# Patient Record
Sex: FEMALE | Race: WHITE | Employment: UNEMPLOYED | URBAN - METROPOLITAN AREA
[De-identification: names, ages, dates, MRNs, and addresses within clinical notes are randomized per-mention and may not be internally consistent; named-entity substitution may affect disease eponyms.]

---

## 2017-01-18 NOTE — TELEPHONE ENCOUNTER
Hypertensive Medications  Protocol Criteria:  · Appointment scheduled in the past 6 months or in the next 3 months  · BMP or CMP in the past 12 months  · Creatinine result < 2  Recent Visits       Provider Department Primary Dx    6 months ago [place],

## 2017-01-19 RX ORDER — LOSARTAN POTASSIUM 50 MG/1
TABLET ORAL
Qty: 60 TABLET | Refills: 1 | Status: SHIPPED | OUTPATIENT
Start: 2017-01-19 | End: 2017-05-12

## 2017-01-19 RX ORDER — BUPROPION HYDROCHLORIDE 150 MG/1
TABLET, EXTENDED RELEASE ORAL
Qty: 60 TABLET | Refills: 2 | Status: SHIPPED | OUTPATIENT
Start: 2017-01-19 | End: 2017-05-12

## 2017-02-01 ENCOUNTER — OFFICE VISIT (OUTPATIENT)
Dept: INTERNAL MEDICINE CLINIC | Facility: CLINIC | Age: 62
End: 2017-02-01

## 2017-02-01 VITALS
SYSTOLIC BLOOD PRESSURE: 134 MMHG | DIASTOLIC BLOOD PRESSURE: 86 MMHG | BODY MASS INDEX: 40.93 KG/M2 | HEIGHT: 63 IN | HEART RATE: 83 BPM | WEIGHT: 231 LBS | RESPIRATION RATE: 20 BRPM | TEMPERATURE: 98 F

## 2017-02-01 DIAGNOSIS — I10 ESSENTIAL HYPERTENSION: Primary | ICD-10-CM

## 2017-02-01 DIAGNOSIS — R10.11 RUQ ABDOMINAL PAIN: ICD-10-CM

## 2017-02-01 DIAGNOSIS — R30.0 DYSURIA: ICD-10-CM

## 2017-02-01 PROCEDURE — 99214 OFFICE O/P EST MOD 30 MIN: CPT | Performed by: INTERNAL MEDICINE

## 2017-02-01 PROCEDURE — 99212 OFFICE O/P EST SF 10 MIN: CPT | Performed by: INTERNAL MEDICINE

## 2017-02-01 RX ORDER — LOSARTAN POTASSIUM 50 MG/1
100 TABLET ORAL
Qty: 60 TABLET | Refills: 0 | Status: SHIPPED | OUTPATIENT
Start: 2017-02-01 | End: 2017-08-09

## 2017-02-01 RX ORDER — OMEPRAZOLE 40 MG/1
40 CAPSULE, DELAYED RELEASE ORAL DAILY
Qty: 30 CAPSULE | Refills: 1 | Status: SHIPPED | OUTPATIENT
Start: 2017-02-01 | End: 2017-03-03

## 2017-02-01 NOTE — PROGRESS NOTES
HPI:    Patient ID: Latha Stapleton is a 59year old female. Pain  Chronicity:  r back /flank pain  Episode onset: 3  days  The problem occurs intermittently. The problem has been waxing and waning.  Associated symptoms include abdominal pain and arthr 12 Hr TAKE 1 TABLET (150 MG TOTAL) BY MOUTH 2 (TWO) TIMES DAILY. Disp: 60 tablet Rfl: 2   Ciclopirox 0.77 % External Gel Apply topically 2 (two) times daily. Disp:  Rfl: 11   Fluocinonide 0.05 % External Gel Apply 60 g topically daily.  From podiatrist Sri Tate (36.5 °C), temperature source Oral, resp. rate 20, height 5' 3\" (1.6 m), weight 231 lb (104.781 kg).          ASSESSMENT/PLAN:   Essential hypertension  (primary encounter diagnosis)  Take high blood pressure medication as perscribed   Low- sodium diet (2g

## 2017-02-07 ENCOUNTER — LAB ENCOUNTER (OUTPATIENT)
Dept: LAB | Age: 62
End: 2017-02-07
Attending: INTERNAL MEDICINE
Payer: COMMERCIAL

## 2017-02-07 ENCOUNTER — HOSPITAL ENCOUNTER (OUTPATIENT)
Dept: ULTRASOUND IMAGING | Age: 62
Discharge: HOME | End: 2017-02-07
Attending: INTERNAL MEDICINE
Payer: COMMERCIAL

## 2017-02-07 DIAGNOSIS — I10 ESSENTIAL HYPERTENSION: ICD-10-CM

## 2017-02-07 DIAGNOSIS — R10.11 RUQ ABDOMINAL PAIN: ICD-10-CM

## 2017-02-07 DIAGNOSIS — R30.0 DYSURIA: ICD-10-CM

## 2017-02-07 LAB
ALBUMIN SERPL BCP-MCNC: 3.8 G/DL (ref 3.5–4.8)
ALBUMIN/GLOB SERPL: 1.3 {RATIO} (ref 1–2)
ALP SERPL-CCNC: 50 U/L (ref 32–100)
ALT SERPL-CCNC: 22 U/L (ref 14–54)
ANION GAP SERPL CALC-SCNC: 6 MMOL/L (ref 0–18)
AST SERPL-CCNC: 19 U/L (ref 15–41)
BACTERIA UR QL AUTO: NEGATIVE /HPF
BASOPHILS # BLD: 0.1 K/UL (ref 0–0.2)
BASOPHILS NFR BLD: 1 %
BILIRUB SERPL-MCNC: 0.6 MG/DL (ref 0.3–1.2)
BUN SERPL-MCNC: 14 MG/DL (ref 8–20)
BUN/CREAT SERPL: 17.7 (ref 10–20)
CALCIUM SERPL-MCNC: 9.2 MG/DL (ref 8.5–10.5)
CHLORIDE SERPL-SCNC: 106 MMOL/L (ref 95–110)
CHOLEST SERPL-MCNC: 197 MG/DL (ref 110–200)
CO2 SERPL-SCNC: 27 MMOL/L (ref 22–32)
CREAT SERPL-MCNC: 0.79 MG/DL (ref 0.5–1.5)
EOSINOPHIL # BLD: 0.2 K/UL (ref 0–0.7)
EOSINOPHIL NFR BLD: 3 %
ERYTHROCYTE [DISTWIDTH] IN BLOOD BY AUTOMATED COUNT: 14.7 % (ref 11–15)
GLOBULIN PLAS-MCNC: 3 G/DL (ref 2.5–3.7)
GLUCOSE SERPL-MCNC: 97 MG/DL (ref 70–99)
HCT VFR BLD AUTO: 42.5 % (ref 35–48)
HDLC SERPL-MCNC: 68 MG/DL
HGB BLD-MCNC: 13.9 G/DL (ref 12–16)
LDLC SERPL CALC-MCNC: 111 MG/DL (ref 0–99)
LIPASE SERPL-CCNC: 31 U/L (ref 22–51)
LYMPHOCYTES # BLD: 1.6 K/UL (ref 1–4)
LYMPHOCYTES NFR BLD: 20 %
MCH RBC QN AUTO: 28.7 PG (ref 27–32)
MCHC RBC AUTO-ENTMCNC: 32.7 G/DL (ref 32–37)
MCV RBC AUTO: 87.7 FL (ref 80–100)
MONOCYTES # BLD: 1 K/UL (ref 0–1)
MONOCYTES NFR BLD: 12 %
NEUTROPHILS # BLD AUTO: 5.1 K/UL (ref 1.8–7.7)
NEUTROPHILS NFR BLD: 64 %
NONHDLC SERPL-MCNC: 129 MG/DL
OSMOLALITY UR CALC.SUM OF ELEC: 288 MOSM/KG (ref 275–295)
PLATELET # BLD AUTO: 362 K/UL (ref 140–400)
PMV BLD AUTO: 9.1 FL (ref 7.4–10.3)
POTASSIUM SERPL-SCNC: 4 MMOL/L (ref 3.3–5.1)
PROT SERPL-MCNC: 6.8 G/DL (ref 5.9–8.4)
RBC # BLD AUTO: 4.84 M/UL (ref 3.7–5.4)
RBC #/AREA URNS AUTO: 1 /HPF
SODIUM SERPL-SCNC: 139 MMOL/L (ref 136–144)
TRIGL SERPL-MCNC: 91 MG/DL (ref 1–149)
TSH SERPL-ACNC: 1.91 UIU/ML (ref 0.34–5.6)
WBC # BLD AUTO: 7.9 K/UL (ref 4–11)
WBC #/AREA URNS AUTO: 1 /HPF

## 2017-02-07 PROCEDURE — 83690 ASSAY OF LIPASE: CPT

## 2017-02-07 PROCEDURE — 76700 US EXAM ABDOM COMPLETE: CPT

## 2017-02-07 PROCEDURE — 84443 ASSAY THYROID STIM HORMONE: CPT

## 2017-02-07 PROCEDURE — 80053 COMPREHEN METABOLIC PANEL: CPT

## 2017-02-07 PROCEDURE — 81015 MICROSCOPIC EXAM OF URINE: CPT

## 2017-02-07 PROCEDURE — 87086 URINE CULTURE/COLONY COUNT: CPT

## 2017-02-07 PROCEDURE — 87147 CULTURE TYPE IMMUNOLOGIC: CPT

## 2017-02-07 PROCEDURE — 85025 COMPLETE CBC W/AUTO DIFF WBC: CPT

## 2017-02-07 PROCEDURE — 80061 LIPID PANEL: CPT

## 2017-02-07 PROCEDURE — 36415 COLL VENOUS BLD VENIPUNCTURE: CPT

## 2017-02-23 ENCOUNTER — TELEPHONE (OUTPATIENT)
Dept: INTERNAL MEDICINE CLINIC | Facility: CLINIC | Age: 62
End: 2017-02-23

## 2017-02-24 NOTE — TELEPHONE ENCOUNTER
----- Message from Sherrie Gar MD sent at 2/10/2017  7:48 AM CST -----  Please  Call patient  US    Abdomen   1. No  evidence of cholelithiasis or acute cholecystitis !     2. Negative for hepatic biliary dilatation.     3. Hepatic steatosis. -FATTY

## 2017-02-24 NOTE — TELEPHONE ENCOUNTER
Spoke with patient ( verified name and ), reviewed information, patient verbalized understanding and agrees with plan.

## 2017-02-28 ENCOUNTER — HOSPITAL (OUTPATIENT)
Dept: OTHER | Age: 62
End: 2017-02-28
Attending: FAMILY MEDICINE

## 2017-05-15 RX ORDER — LOSARTAN POTASSIUM 50 MG/1
TABLET ORAL
Qty: 180 TABLET | Refills: 0 | Status: SHIPPED | OUTPATIENT
Start: 2017-05-15 | End: 2017-08-09

## 2017-05-15 RX ORDER — BUPROPION HYDROCHLORIDE 150 MG/1
TABLET, EXTENDED RELEASE ORAL
Qty: 180 TABLET | Refills: 0 | Status: SHIPPED | OUTPATIENT
Start: 2017-05-15 | End: 2018-02-13

## 2017-05-15 NOTE — TELEPHONE ENCOUNTER
Hypertensive Medications  Protocol Criteria:  · Appointment scheduled in the past 6 months or in the next 3 months  · BMP or CMP in the past 12 months  · Creatinine result < 2  Recent Visits       Provider Department Primary Dx    3 months ago [place],

## 2017-08-03 ENCOUNTER — TELEPHONE (OUTPATIENT)
Dept: ORTHOPEDICS CLINIC | Facility: CLINIC | Age: 62
End: 2017-08-03

## 2017-08-03 ENCOUNTER — TELEPHONE (OUTPATIENT)
Dept: INTERNAL MEDICINE CLINIC | Facility: CLINIC | Age: 62
End: 2017-08-03

## 2017-08-09 RX ORDER — LOSARTAN POTASSIUM 50 MG/1
100 TABLET ORAL
Qty: 180 TABLET | Refills: 0 | Status: SHIPPED | OUTPATIENT
Start: 2017-08-09 | End: 2017-12-02

## 2017-08-09 NOTE — TELEPHONE ENCOUNTER
Hypertensive Medications  Protocol Criteria:  · Appointment scheduled in the past 6 months or in the next 3 months  · BMP or CMP in the past 12 months  · Creatinine result < 2  Recent Outpatient Visits            6 months ago Essential hypertension    [place]

## 2017-12-02 NOTE — TELEPHONE ENCOUNTER
Hypertensive Medications  Protocol Criteria:  · Appointment scheduled in the past 6 months or in the next 3 months  · BMP or CMP in the past 12 months  · Creatinine result < 2  Recent Outpatient Visits            10 months ago Essential hypertension    [place]

## 2017-12-05 RX ORDER — LOSARTAN POTASSIUM 50 MG/1
TABLET ORAL
Qty: 180 TABLET | Refills: 0 | Status: SHIPPED | OUTPATIENT
Start: 2017-12-05 | End: 2018-03-15

## 2018-01-29 ENCOUNTER — TELEPHONE (OUTPATIENT)
Dept: OTHER | Age: 63
End: 2018-01-29

## 2018-01-29 NOTE — TELEPHONE ENCOUNTER
Please advise to the communication below. Pt last seen 02/17. Thank you. Nereyda Barrera Please respond to pool: EM IM LMB LPN/CMA

## 2018-01-29 NOTE — TELEPHONE ENCOUNTER
Pt  Need apt   Last  Visit     Almost  1 year  ago - need  Visit .      Please  Schedule apt  Also for pt  to bring  written  Order  From  Doctor  -  For compression  Stockings

## 2018-01-29 NOTE — TELEPHONE ENCOUNTER
Need Rx for compression stockings 30/40 flat knit thigh high and knee high  For lymphedemia  Pt requesting it mailed to   [address], [address]      Please respond to pool: EM IM LMB LPN/CMA

## 2018-01-30 NOTE — TELEPHONE ENCOUNTER
Message was relayed and left on voicemail. RAYMUNDO when pt calls back please assist her with scheduling an appt. Thank you.

## 2018-02-01 ENCOUNTER — TELEPHONE (OUTPATIENT)
Dept: OTHER | Age: 63
End: 2018-02-01

## 2018-02-01 NOTE — TELEPHONE ENCOUNTER
Pt called requesting merino Order for Compression stockings  Flat Knit Thigh High 30-40 compression    DX Lymphedema   Pt requesting an appt on Next Wednesday with You but only SDS and TCM available    She has an Appt Next Wednesday at 5 PM for measurement an

## 2018-02-12 NOTE — TELEPHONE ENCOUNTER
Please advise to the communication below. Should pt keep the appt for tomorrow? Or should order be written? Please advise, thank you. Timbo Solares Please respond to pool: FRANTZ HURLEY LPN/FRANCIE

## 2018-02-13 ENCOUNTER — OFFICE VISIT (OUTPATIENT)
Dept: INTERNAL MEDICINE CLINIC | Facility: CLINIC | Age: 63
End: 2018-02-13

## 2018-02-13 VITALS
HEART RATE: 68 BPM | BODY MASS INDEX: 39.98 KG/M2 | DIASTOLIC BLOOD PRESSURE: 80 MMHG | TEMPERATURE: 97 F | RESPIRATION RATE: 20 BRPM | SYSTOLIC BLOOD PRESSURE: 141 MMHG | HEIGHT: 63 IN | WEIGHT: 225.63 LBS

## 2018-02-13 DIAGNOSIS — Z00.00 PHYSICAL EXAM: ICD-10-CM

## 2018-02-13 DIAGNOSIS — Z23 NEED FOR VACCINATION: ICD-10-CM

## 2018-02-13 DIAGNOSIS — Z12.11 SCREENING FOR MALIGNANT NEOPLASM OF COLON: ICD-10-CM

## 2018-02-13 DIAGNOSIS — I10 ESSENTIAL HYPERTENSION: ICD-10-CM

## 2018-02-13 DIAGNOSIS — Z12.31 SCREENING MAMMOGRAM, ENCOUNTER FOR: ICD-10-CM

## 2018-02-13 DIAGNOSIS — C43.72: ICD-10-CM

## 2018-02-13 DIAGNOSIS — I89.0 LYMPHEDEMA OF LEFT LEG: Primary | ICD-10-CM

## 2018-02-13 PROCEDURE — 99213 OFFICE O/P EST LOW 20 MIN: CPT | Performed by: INTERNAL MEDICINE

## 2018-02-13 PROCEDURE — 90471 IMMUNIZATION ADMIN: CPT | Performed by: INTERNAL MEDICINE

## 2018-02-13 PROCEDURE — 99396 PREV VISIT EST AGE 40-64: CPT | Performed by: INTERNAL MEDICINE

## 2018-02-13 PROCEDURE — 90670 PCV13 VACCINE IM: CPT | Performed by: INTERNAL MEDICINE

## 2018-02-13 NOTE — PROGRESS NOTES
HPI:    Patient ID: Claudette Carballo is a 57year old female.   Patient patient presents today for  htn , physical exam still smoking 1/4  PPD , states doing well otherwise, denies chest pain, shortness of breath, dyspnea on exertion or heart palpitations Current Outpatient Prescriptions:  Elastic Bandages & Supports (SUPPORT COMPRESSION SOCK MENS) Does not apply Misc 20 mm by Does not apply route daily.  20-30  Mm   Hg pressure   Knee  HighLymphedema Disp: 2 each Rfl: 2   LOSARTAN POTASSIUM 50 MG Oral Tab T Psychiatric: She has a normal mood and affect. Her behavior is normal.   Nursing note and vitals reviewed.              ASSESSMENT/PLAN:   Lymphedema of left leg  (primary encounter diagnosis)  Chronic  Mild  Elastic  Sock  Knee   High    20-30  Mm   Stable OP REFERRAL TO [address] [place] [facility]  PNEUMOCOCCAL VACC, 13 MEDINA IM  SEFERINO SCREENING BILAT (CPT=77067)       #[phone]

## 2018-02-21 ENCOUNTER — TELEPHONE (OUTPATIENT)
Dept: HEMATOLOGY/ONCOLOGY | Facility: HOSPITAL | Age: 63
End: 2018-02-21

## 2018-02-21 ENCOUNTER — TELEPHONE (OUTPATIENT)
Dept: GASTROENTEROLOGY | Facility: CLINIC | Age: 63
End: 2018-02-21

## 2018-02-21 DIAGNOSIS — Z86.010 HISTORY OF COLONIC POLYPS: Primary | ICD-10-CM

## 2018-02-21 NOTE — TELEPHONE ENCOUNTER
Pt calling to r/s colon procedure cx in 2016. Last Procedure:   1/7/13 with 6 polyps removed  Last Diagnosis:  Multiple small polyps removed from throughout the colon. 2.  Small internal hemorrhoids, likely source of bleeding.   3.  Early diverticular

## 2018-02-21 NOTE — TELEPHONE ENCOUNTER
The patient's chart has been reviewed. Okay to schedule pt for 3-5 year year colonoscopy recall r/t history of colon polyps with Dr. Sukhwinder Martinez. Advise MAC sedation due to BMI >30 with split dose Suprep (eRx) preparation.    May substitute Colyte/TriLyte as

## 2018-02-21 NOTE — TELEPHONE ENCOUNTER
I attempted to reach Jag Weems to book an Onc consult with Dr Xiomara Cesar for surveillance of hx of Melanoma left 1st toe. I left the office number & asked her to please call the office at her earliest convenience.

## 2018-02-23 NOTE — TELEPHONE ENCOUNTER
Scheduled for:  Colonoscopy 73881  Provider Name: Dr. Jorge Burton  Date:  3/12/18  Location:  [facility]  Sedation:  MAC  Time:  0930 (pt is aware to arrive at 0830)   Prep:  Suprep,  Mailed 2/23/18 with codes  Meds/Allergies Reconciled?:  Norma/VIOLET reviewed   Diagnos

## 2018-02-24 ENCOUNTER — LAB ENCOUNTER (OUTPATIENT)
Dept: LAB | Age: 63
End: 2018-02-24
Attending: INTERNAL MEDICINE
Payer: COMMERCIAL

## 2018-02-24 ENCOUNTER — HOSPITAL ENCOUNTER (OUTPATIENT)
Dept: MAMMOGRAPHY | Age: 63
Discharge: HOME | End: 2018-02-24
Attending: INTERNAL MEDICINE
Payer: COMMERCIAL

## 2018-02-24 DIAGNOSIS — Z12.31 SCREENING MAMMOGRAM, ENCOUNTER FOR: ICD-10-CM

## 2018-02-24 DIAGNOSIS — I10 ESSENTIAL HYPERTENSION: ICD-10-CM

## 2018-02-24 DIAGNOSIS — C43.72: ICD-10-CM

## 2018-02-24 DIAGNOSIS — Z00.00 PHYSICAL EXAM: ICD-10-CM

## 2018-02-24 LAB
ALBUMIN SERPL BCP-MCNC: 4 G/DL (ref 3.5–4.8)
ALBUMIN/GLOB SERPL: 1.4 {RATIO} (ref 1–2)
ALP SERPL-CCNC: 44 U/L (ref 32–100)
ALT SERPL-CCNC: 26 U/L (ref 14–54)
ANION GAP SERPL CALC-SCNC: 7 MMOL/L (ref 0–18)
AST SERPL-CCNC: 21 U/L (ref 15–41)
BACTERIA UR QL AUTO: NEGATIVE /HPF
BASOPHILS # BLD: 0.1 K/UL (ref 0–0.2)
BASOPHILS NFR BLD: 1 %
BILIRUB SERPL-MCNC: 0.7 MG/DL (ref 0.3–1.2)
BUN SERPL-MCNC: 14 MG/DL (ref 8–20)
BUN/CREAT SERPL: 17.3 (ref 10–20)
CALCIUM SERPL-MCNC: 9.8 MG/DL (ref 8.5–10.5)
CHLORIDE SERPL-SCNC: 107 MMOL/L (ref 95–110)
CHOLEST SERPL-MCNC: 197 MG/DL (ref 110–200)
CO2 SERPL-SCNC: 26 MMOL/L (ref 22–32)
CREAT SERPL-MCNC: 0.81 MG/DL (ref 0.5–1.5)
EOSINOPHIL # BLD: 0.2 K/UL (ref 0–0.7)
EOSINOPHIL NFR BLD: 3 %
ERYTHROCYTE [DISTWIDTH] IN BLOOD BY AUTOMATED COUNT: 15 % (ref 11–15)
GLOBULIN PLAS-MCNC: 2.8 G/DL (ref 2.5–3.7)
GLUCOSE SERPL-MCNC: 96 MG/DL (ref 70–99)
HCT VFR BLD AUTO: 42.9 % (ref 35–48)
HDLC SERPL-MCNC: 66 MG/DL
HGB BLD-MCNC: 14.5 G/DL (ref 12–16)
LDLC SERPL CALC-MCNC: 113 MG/DL (ref 0–99)
LYMPHOCYTES # BLD: 1.8 K/UL (ref 1–4)
LYMPHOCYTES NFR BLD: 21 %
MCH RBC QN AUTO: 29.3 PG (ref 27–32)
MCHC RBC AUTO-ENTMCNC: 33.8 G/DL (ref 32–37)
MCV RBC AUTO: 86.7 FL (ref 80–100)
MONOCYTES # BLD: 1.2 K/UL (ref 0–1)
MONOCYTES NFR BLD: 14 %
NEUTROPHILS # BLD AUTO: 5.2 K/UL (ref 1.8–7.7)
NEUTROPHILS NFR BLD: 61 %
NONHDLC SERPL-MCNC: 131 MG/DL
OSMOLALITY UR CALC.SUM OF ELEC: 290 MOSM/KG (ref 275–295)
PATIENT FASTING: YES
PLATELET # BLD AUTO: 323 K/UL (ref 140–400)
PMV BLD AUTO: 9.4 FL (ref 7.4–10.3)
POTASSIUM SERPL-SCNC: 4.5 MMOL/L (ref 3.3–5.1)
PROT SERPL-MCNC: 6.8 G/DL (ref 5.9–8.4)
RBC # BLD AUTO: 4.95 M/UL (ref 3.7–5.4)
RBC #/AREA URNS AUTO: 1 /HPF
SODIUM SERPL-SCNC: 140 MMOL/L (ref 136–144)
TRIGL SERPL-MCNC: 88 MG/DL (ref 1–149)
TSH SERPL-ACNC: 0.84 UIU/ML (ref 0.45–5.33)
WBC # BLD AUTO: 8.5 K/UL (ref 4–11)
WBC #/AREA URNS AUTO: 4 /HPF

## 2018-02-24 PROCEDURE — 36415 COLL VENOUS BLD VENIPUNCTURE: CPT

## 2018-02-24 PROCEDURE — 84443 ASSAY THYROID STIM HORMONE: CPT

## 2018-02-24 PROCEDURE — 80061 LIPID PANEL: CPT

## 2018-02-24 PROCEDURE — 77067 SCR MAMMO BI INCL CAD: CPT | Performed by: INTERNAL MEDICINE

## 2018-02-24 PROCEDURE — 85025 COMPLETE CBC W/AUTO DIFF WBC: CPT

## 2018-02-24 PROCEDURE — 80053 COMPREHEN METABOLIC PANEL: CPT

## 2018-02-24 PROCEDURE — 81015 MICROSCOPIC EXAM OF URINE: CPT

## 2018-02-26 ENCOUNTER — TELEPHONE (OUTPATIENT)
Dept: GASTROENTEROLOGY | Facility: CLINIC | Age: 63
End: 2018-02-26

## 2018-02-26 NOTE — TELEPHONE ENCOUNTER
Pt states that RX for prep is too expensive. Please call.         Current Outpatient Prescriptions:   •  Na Sulfate-K Sulfate-Mg Sulf (SUPREP BOWEL PREP KIT) 17.5-3.13-1.6 GM/180ML Oral Solution, Take as directed per GI consult notes, Disp: 1 Bottle, Rfl:

## 2018-02-27 ENCOUNTER — OFFICE VISIT (OUTPATIENT)
Dept: HEMATOLOGY/ONCOLOGY | Facility: HOSPITAL | Age: 63
End: 2018-02-27
Attending: INTERNAL MEDICINE
Payer: COMMERCIAL

## 2018-02-27 VITALS
HEIGHT: 63 IN | WEIGHT: 223 LBS | TEMPERATURE: 99 F | SYSTOLIC BLOOD PRESSURE: 125 MMHG | RESPIRATION RATE: 16 BRPM | HEART RATE: 70 BPM | BODY MASS INDEX: 39.51 KG/M2 | DIASTOLIC BLOOD PRESSURE: 53 MMHG

## 2018-02-27 DIAGNOSIS — Z71.6 ENCOUNTER FOR SMOKING CESSATION COUNSELING: ICD-10-CM

## 2018-02-27 DIAGNOSIS — C43.9 MALIGNANT MELANOMA, UNSPECIFIED SITE (HCC): Primary | ICD-10-CM

## 2018-02-27 PROCEDURE — 99245 OFF/OP CONSLTJ NEW/EST HI 55: CPT | Performed by: INTERNAL MEDICINE

## 2018-02-27 NOTE — CONSULTS
[facility]    PATIENT'S NAME: SUSHIL GONZALES   CONSULTING PHYSICIAN: Leanne Oreilly MD   PATIENT ACCOUNT #: [de-identified] LOCATION: [address] [place] [address] RECORD #: [id number] YOB: 1955   CONSULTATION DATE: 02/27/2018       CANCER C mucous membranes. Oropharynx clear. NECK:  Supple. LUNGS:  Symmetric expansion, nonlabored breathing. HEART:  Good distal pulses. ABDOMEN:  Soft, nontender, nondistended. No masses. No organomegaly. EXTREMITIES:  No edema.   SKIN:  No visible or pa

## 2018-02-28 ENCOUNTER — TELEPHONE (OUTPATIENT)
Dept: HEMATOLOGY/ONCOLOGY | Facility: HOSPITAL | Age: 63
End: 2018-02-28

## 2018-02-28 ENCOUNTER — TELEPHONE (OUTPATIENT)
Dept: INTERNAL MEDICINE CLINIC | Facility: CLINIC | Age: 63
End: 2018-02-28

## 2018-02-28 DIAGNOSIS — C43.9 MALIGNANT MELANOMA, UNSPECIFIED SITE (HCC): Primary | ICD-10-CM

## 2018-02-28 NOTE — TELEPHONE ENCOUNTER
Per Dr Von Resendez and PCP left message for Alisha that she will be referred to Dr Enedina Newell in dermatology for surveillance and to call with any questions.

## 2018-02-28 NOTE — TELEPHONE ENCOUNTER
Per Cesario Steel at Dr. Tavia Palmer office it was stated in pt' notes from last OV that referral to derm will be needed. Referral generated and signed per Dr. Tavia Palmer order.

## 2018-03-04 NOTE — PROGRESS NOTES
Please call patient with blood test results. Kidney and liver function are normal, no anemia. Cholesterol is mildly elevated - keep low fat diet  Weight reduction and  Exercise . sugar is normal,  Thyroid hormone is normal as well. Urine is clear.

## 2018-03-08 ENCOUNTER — TELEPHONE (OUTPATIENT)
Dept: GASTROENTEROLOGY | Facility: CLINIC | Age: 63
End: 2018-03-08

## 2018-03-08 NOTE — TELEPHONE ENCOUNTER
Instructions sent Trang, pt contacted. States she doesn't use [employer].  Faxed new instructions to her at [phone] per her request.

## 2018-03-08 NOTE — TELEPHONE ENCOUNTER
Pt has has cln/procedure irma on 3/12 Monday and has questions regarding the prep instructions, pls call at:[phone],thanks. *pt has the generic and now indicates no instruction were received/ had instructions for the suprep only.

## 2018-03-12 ENCOUNTER — ANESTHESIA (OUTPATIENT)
Dept: ENDOSCOPY | Facility: HOSPITAL | Age: 63
End: 2018-03-12
Payer: COMMERCIAL

## 2018-03-12 ENCOUNTER — HOSPITAL ENCOUNTER (OUTPATIENT)
Facility: HOSPITAL | Age: 63
Setting detail: HOSPITAL OUTPATIENT SURGERY
Discharge: HOME | End: 2018-03-12
Attending: INTERNAL MEDICINE | Admitting: INTERNAL MEDICINE
Payer: COMMERCIAL

## 2018-03-12 ENCOUNTER — ANESTHESIA EVENT (OUTPATIENT)
Dept: ENDOSCOPY | Facility: HOSPITAL | Age: 63
End: 2018-03-12
Payer: COMMERCIAL

## 2018-03-12 ENCOUNTER — SURGERY (OUTPATIENT)
Age: 63
End: 2018-03-12

## 2018-03-12 DIAGNOSIS — Z86.010 HISTORY OF COLONIC POLYPS: ICD-10-CM

## 2018-03-12 PROCEDURE — 45380 COLONOSCOPY AND BIOPSY: CPT | Performed by: INTERNAL MEDICINE

## 2018-03-12 PROCEDURE — 0DBL8ZX EXCISION OF TRANSVERSE COLON, VIA NATURAL OR ARTIFICIAL OPENING ENDOSCOPIC, DIAGNOSTIC: ICD-10-PCS | Performed by: INTERNAL MEDICINE

## 2018-03-12 PROCEDURE — 0DBK8ZX EXCISION OF ASCENDING COLON, VIA NATURAL OR ARTIFICIAL OPENING ENDOSCOPIC, DIAGNOSTIC: ICD-10-PCS | Performed by: INTERNAL MEDICINE

## 2018-03-12 PROCEDURE — 45385 COLONOSCOPY W/LESION REMOVAL: CPT | Performed by: INTERNAL MEDICINE

## 2018-03-12 RX ORDER — NALOXONE HYDROCHLORIDE 0.4 MG/ML
80 INJECTION, SOLUTION INTRAMUSCULAR; INTRAVENOUS; SUBCUTANEOUS AS NEEDED
Status: DISCONTINUED | OUTPATIENT
Start: 2018-03-12 | End: 2018-03-12

## 2018-03-12 RX ORDER — MORPHINE SULFATE 10 MG/ML
6 INJECTION, SOLUTION INTRAMUSCULAR; INTRAVENOUS EVERY 10 MIN PRN
Status: DISCONTINUED | OUTPATIENT
Start: 2018-03-12 | End: 2018-03-12

## 2018-03-12 RX ORDER — MORPHINE SULFATE 4 MG/ML
2 INJECTION, SOLUTION INTRAMUSCULAR; INTRAVENOUS EVERY 10 MIN PRN
Status: DISCONTINUED | OUTPATIENT
Start: 2018-03-12 | End: 2018-03-12

## 2018-03-12 RX ORDER — HALOPERIDOL 5 MG/ML
0.25 INJECTION INTRAMUSCULAR ONCE AS NEEDED
Status: DISCONTINUED | OUTPATIENT
Start: 2018-03-12 | End: 2018-03-12

## 2018-03-12 RX ORDER — MIDAZOLAM HYDROCHLORIDE 1 MG/ML
INJECTION INTRAMUSCULAR; INTRAVENOUS AS NEEDED
Status: DISCONTINUED | OUTPATIENT
Start: 2018-03-12 | End: 2018-03-12 | Stop reason: SURG

## 2018-03-12 RX ORDER — ONDANSETRON 2 MG/ML
4 INJECTION INTRAMUSCULAR; INTRAVENOUS ONCE AS NEEDED
Status: DISCONTINUED | OUTPATIENT
Start: 2018-03-12 | End: 2018-03-12

## 2018-03-12 RX ORDER — SODIUM CHLORIDE, SODIUM LACTATE, POTASSIUM CHLORIDE, CALCIUM CHLORIDE 600; 310; 30; 20 MG/100ML; MG/100ML; MG/100ML; MG/100ML
INJECTION, SOLUTION INTRAVENOUS CONTINUOUS
Status: DISCONTINUED | OUTPATIENT
Start: 2018-03-12 | End: 2018-03-12

## 2018-03-12 RX ORDER — LIDOCAINE HYDROCHLORIDE 10 MG/ML
INJECTION, SOLUTION EPIDURAL; INFILTRATION; INTRACAUDAL; PERINEURAL AS NEEDED
Status: DISCONTINUED | OUTPATIENT
Start: 2018-03-12 | End: 2018-03-12 | Stop reason: SURG

## 2018-03-12 RX ORDER — MORPHINE SULFATE 4 MG/ML
4 INJECTION, SOLUTION INTRAMUSCULAR; INTRAVENOUS EVERY 10 MIN PRN
Status: DISCONTINUED | OUTPATIENT
Start: 2018-03-12 | End: 2018-03-12

## 2018-03-12 RX ORDER — HYDROCODONE BITARTRATE AND ACETAMINOPHEN 5; 325 MG/1; MG/1
2 TABLET ORAL AS NEEDED
Status: DISCONTINUED | OUTPATIENT
Start: 2018-03-12 | End: 2018-03-12

## 2018-03-12 RX ORDER — HYDROCODONE BITARTRATE AND ACETAMINOPHEN 5; 325 MG/1; MG/1
1 TABLET ORAL AS NEEDED
Status: DISCONTINUED | OUTPATIENT
Start: 2018-03-12 | End: 2018-03-12

## 2018-03-12 RX ADMIN — MIDAZOLAM HYDROCHLORIDE 2 MG: 1 INJECTION INTRAMUSCULAR; INTRAVENOUS at 09:41:00

## 2018-03-12 RX ADMIN — SODIUM CHLORIDE, SODIUM LACTATE, POTASSIUM CHLORIDE, CALCIUM CHLORIDE: 600; 310; 30; 20 INJECTION, SOLUTION INTRAVENOUS at 09:55:00

## 2018-03-12 RX ADMIN — LIDOCAINE HYDROCHLORIDE 25 MG: 10 INJECTION, SOLUTION EPIDURAL; INFILTRATION; INTRACAUDAL; PERINEURAL at 09:41:00

## 2018-03-12 RX ADMIN — SODIUM CHLORIDE, SODIUM LACTATE, POTASSIUM CHLORIDE, CALCIUM CHLORIDE: 600; 310; 30; 20 INJECTION, SOLUTION INTRAVENOUS at 09:41:00

## 2018-03-12 NOTE — ANESTHESIA POSTPROCEDURE EVALUATION
Patient: Amber Lin    Procedure Summary     Date:  03/12/18 Room / Location:  [address] [place] [address] ENDOSCOPY 05 / [address] [place] [address] ENDOSCOPY    Anesthesia Start:  9562 Anesthesia Stop:  2754    Procedure:  COLONOSCOPY (N/A ) Diagnosis:       History of colonic polyps      (poly

## 2018-03-12 NOTE — H&P
History & Physical Examination    Patient Name: Vince Sarkar  MRN: [id number]  [facility]: [id number]  YOB: 1955    Diagnosis: history of colon polyps        Prescriptions Prior to Admission:  Elastic Bandages & Supports (SUPPORT COMPRESSION SO (VERSED) 2 MG/2ML injection  Intravenous PRN   Lidocaine HCl (PF) (XYLOCAINE) 1 % injection SOLN  Intravenous PRN       Allergies: No Known Allergies    Past Medical History:   Diagnosis Date   • [facility]     melanoma   • Colon polyps    • High blood p

## 2018-03-12 NOTE — ANESTHESIA PREPROCEDURE EVALUATION
Anesthesia PreOp Note    HPI:     Rasisa Heading is a 57year old female who presents for preoperative consultation requested by: Suzan Luong MD    Date of Surgery: 3/12/2018    Procedure(s):  COLONOSCOPY  Indication: History of colonic polyps PACKS) 227.1 g Oral Recon Soln Use as directed prior to colonoscopy Disp: 1 Bottle Rfl: 0 Taking       Current Facility-Administered Medications Ordered in Epic:  lactated ringers infusion  Intravenous Continuous Bradley Gan MD     No current Epic-or Pulse:  79   Resp:  14   SpO2:  98%   Weight: 105.7 kg (233 lb)    Height: 1.6 m (5' 3\")         Anesthesia ROS/Med Hx and Physical Exam     Patient summary reviewed and Nursing notes reviewed    Airway   Mallampati: II  TM distance: >3 FB  Neck ROM: fu

## 2018-03-12 NOTE — OPERATIVE REPORT
[facility] HOSP - [facility] Endoscopy Report      Preoperative Diagnosis:  - history of colon polyps       Postoperative Diagnosis:  - colon polyps x 2  - diverticulosis  - internal hemorrhoids      Procedure:    Colonoscopy       Surgeon:  Yodit Stiles

## 2018-03-13 VITALS
HEIGHT: 63 IN | RESPIRATION RATE: 14 BRPM | SYSTOLIC BLOOD PRESSURE: 126 MMHG | BODY MASS INDEX: 41.29 KG/M2 | HEART RATE: 75 BPM | OXYGEN SATURATION: 94 % | DIASTOLIC BLOOD PRESSURE: 70 MMHG | WEIGHT: 233 LBS

## 2018-03-14 ENCOUNTER — TELEPHONE (OUTPATIENT)
Dept: GASTROENTEROLOGY | Facility: CLINIC | Age: 63
End: 2018-03-14

## 2018-03-14 NOTE — TELEPHONE ENCOUNTER
----- Message from Barbara Herman MD sent at 3/12/2018  3:56 PM CDT -----  I wanted to get back to you with your colonoscopy results. You had 2 colon polyps removed which were benign.   I would advise a repeat colonoscopy in 5 years to make sure no new p

## 2018-03-15 RX ORDER — LOSARTAN POTASSIUM 50 MG/1
TABLET ORAL
Qty: 180 TABLET | Refills: 0 | Status: SHIPPED | OUTPATIENT
Start: 2018-03-15 | End: 2018-06-23

## 2018-03-15 NOTE — TELEPHONE ENCOUNTER
Hypertensive Medications  Protocol Criteria:  · Appointment scheduled in the past 6 months or in the next 3 months  · BMP or CMP in the past 12 months  · Creatinine result < 2  Recent Outpatient Visits            2 weeks ago Malignant melanoma, unspecified

## 2018-06-23 RX ORDER — LOSARTAN POTASSIUM 50 MG/1
TABLET ORAL
Qty: 180 TABLET | Refills: 0 | Status: SHIPPED | OUTPATIENT
Start: 2018-06-23 | End: 2019-01-14

## 2018-06-27 RX ORDER — LOSARTAN POTASSIUM 50 MG/1
TABLET ORAL
Qty: 180 TABLET | Refills: 0 | OUTPATIENT
Start: 2018-06-27

## 2018-09-22 RX ORDER — LOSARTAN POTASSIUM 50 MG/1
TABLET ORAL
Qty: 180 TABLET | Refills: 0 | OUTPATIENT
Start: 2018-09-22

## 2018-10-05 NOTE — TELEPHONE ENCOUNTER
please advise as does not meet RN protocol for refill criteria        Hypertensive Medications  Protocol Criteria:  · Appointment scheduled in the past 6 months or in the next 3 months  · BMP or CMP in the past 12 months  · Creatinine result < 2  Recent O

## 2018-10-06 RX ORDER — LOSARTAN POTASSIUM 50 MG/1
TABLET ORAL
Qty: 180 TABLET | Refills: 0 | Status: SHIPPED | OUTPATIENT
Start: 2018-10-06 | End: 2019-01-10

## 2019-01-14 ENCOUNTER — TELEPHONE (OUTPATIENT)
Dept: INTERNAL MEDICINE CLINIC | Facility: CLINIC | Age: 64
End: 2019-01-14

## 2019-01-14 DIAGNOSIS — Z00.00 ANNUAL PHYSICAL EXAM: Primary | ICD-10-CM

## 2019-01-14 RX ORDER — LOSARTAN POTASSIUM 50 MG/1
TABLET ORAL
Qty: 69 TABLET | Refills: 0 | Status: SHIPPED | OUTPATIENT
Start: 2019-01-14 | End: 2019-02-22

## 2019-01-14 NOTE — TELEPHONE ENCOUNTER
Pt is requesting an order for labs. Order pend for approval. Please advise, thank you. Florencia Cortez .Please respond to pool: EM IM LMB LPN/CMA

## 2019-01-14 NOTE — TELEPHONE ENCOUNTER
Pt called in requesting to have px lab orders placed on to her chart prior to her appt on 2/5.   Please advise

## 2019-01-15 NOTE — TELEPHONE ENCOUNTER
Left detailed message on voice identified mailbox informing patient that lab orders were approved by Dr. Jade Briggs. Encouraged patient to call office back if any questions.

## 2019-02-12 ENCOUNTER — LAB ENCOUNTER (OUTPATIENT)
Dept: LAB | Age: 64
End: 2019-02-12
Attending: INTERNAL MEDICINE
Payer: COMMERCIAL

## 2019-02-12 ENCOUNTER — OFFICE VISIT (OUTPATIENT)
Dept: INTERNAL MEDICINE CLINIC | Facility: CLINIC | Age: 64
End: 2019-02-12
Payer: COMMERCIAL

## 2019-02-12 VITALS
WEIGHT: 215.13 LBS | SYSTOLIC BLOOD PRESSURE: 133 MMHG | DIASTOLIC BLOOD PRESSURE: 78 MMHG | TEMPERATURE: 98 F | HEART RATE: 78 BPM | BODY MASS INDEX: 38.12 KG/M2 | RESPIRATION RATE: 20 BRPM | HEIGHT: 63 IN

## 2019-02-12 DIAGNOSIS — Z12.31 SCREENING MAMMOGRAM, ENCOUNTER FOR: ICD-10-CM

## 2019-02-12 DIAGNOSIS — F17.200 TOBACCO DEPENDENCE: ICD-10-CM

## 2019-02-12 DIAGNOSIS — Z00.00 PE (PHYSICAL EXAM), ROUTINE: ICD-10-CM

## 2019-02-12 DIAGNOSIS — Z00.00 ANNUAL PHYSICAL EXAM: ICD-10-CM

## 2019-02-12 DIAGNOSIS — I10 ESSENTIAL HYPERTENSION: Primary | ICD-10-CM

## 2019-02-12 DIAGNOSIS — F32.A DEPRESSION, UNSPECIFIED DEPRESSION TYPE: ICD-10-CM

## 2019-02-12 LAB
ALBUMIN SERPL-MCNC: 3.6 G/DL (ref 3.4–5)
ALBUMIN/GLOB SERPL: 1 {RATIO} (ref 1–2)
ALP LIVER SERPL-CCNC: 58 U/L (ref 50–130)
ALT SERPL-CCNC: 25 U/L (ref 13–56)
ANION GAP SERPL CALC-SCNC: 7 MMOL/L (ref 0–18)
AST SERPL-CCNC: 13 U/L (ref 15–37)
BASOPHILS # BLD AUTO: 0.1 X10(3) UL (ref 0–0.2)
BASOPHILS NFR BLD AUTO: 1.2 %
BILIRUB SERPL-MCNC: 0.5 MG/DL (ref 0.1–2)
BILIRUB UR QL: NEGATIVE
BUN BLD-MCNC: 20 MG/DL (ref 7–18)
BUN/CREAT SERPL: 22.7 (ref 10–20)
CALCIUM BLD-MCNC: 9.7 MG/DL (ref 8.5–10.1)
CHLORIDE SERPL-SCNC: 107 MMOL/L (ref 98–107)
CHOLEST SMN-MCNC: 200 MG/DL (ref ?–200)
CO2 SERPL-SCNC: 27 MMOL/L (ref 21–32)
COLOR UR: YELLOW
CREAT BLD-MCNC: 0.88 MG/DL (ref 0.55–1.02)
DEPRECATED RDW RBC AUTO: 47.1 FL (ref 35.1–46.3)
EOSINOPHIL # BLD AUTO: 0.17 X10(3) UL (ref 0–0.7)
EOSINOPHIL NFR BLD AUTO: 2 %
ERYTHROCYTE [DISTWIDTH] IN BLOOD BY AUTOMATED COUNT: 14.7 % (ref 11–15)
GLOBULIN PLAS-MCNC: 3.7 G/DL (ref 2.8–4.4)
GLUCOSE BLD-MCNC: 93 MG/DL (ref 70–99)
GLUCOSE UR-MCNC: NEGATIVE MG/DL
HCT VFR BLD AUTO: 43.3 % (ref 35–48)
HDLC SERPL-MCNC: 77 MG/DL (ref 40–59)
HGB BLD-MCNC: 14.5 G/DL (ref 12–16)
IMM GRANULOCYTES # BLD AUTO: 0.02 X10(3) UL (ref 0–1)
IMM GRANULOCYTES NFR BLD: 0.2 %
KETONES UR-MCNC: NEGATIVE MG/DL
LDLC SERPL CALC-MCNC: 108 MG/DL (ref ?–100)
LYMPHOCYTES # BLD AUTO: 1.56 X10(3) UL (ref 1–4)
LYMPHOCYTES NFR BLD AUTO: 18.8 %
M PROTEIN MFR SERPL ELPH: 7.3 G/DL (ref 6.4–8.2)
MCH RBC QN AUTO: 29.2 PG (ref 26–34)
MCHC RBC AUTO-ENTMCNC: 33.5 G/DL (ref 31–37)
MCV RBC AUTO: 87.1 FL (ref 80–100)
MONOCYTES # BLD AUTO: 1.24 X10(3) UL (ref 0.1–1)
MONOCYTES NFR BLD AUTO: 14.9 %
NEUTROPHILS # BLD AUTO: 5.21 X10 (3) UL (ref 1.5–7.7)
NEUTROPHILS # BLD AUTO: 5.21 X10(3) UL (ref 1.5–7.7)
NEUTROPHILS NFR BLD AUTO: 62.9 %
NITRITE UR QL STRIP.AUTO: NEGATIVE
NONHDLC SERPL-MCNC: 123 MG/DL (ref ?–130)
OSMOLALITY SERPL CALC.SUM OF ELEC: 294 MOSM/KG (ref 275–295)
PH UR: 5 [PH] (ref 5–8)
PLATELET # BLD AUTO: 376 10(3)UL (ref 150–450)
POTASSIUM SERPL-SCNC: 4.1 MMOL/L (ref 3.5–5.1)
PROT UR-MCNC: NEGATIVE MG/DL
RBC # BLD AUTO: 4.97 X10(6)UL (ref 3.8–5.3)
RBC #/AREA URNS AUTO: 3 /HPF
SODIUM SERPL-SCNC: 141 MMOL/L (ref 136–145)
SP GR UR STRIP: 1.02 (ref 1–1.03)
TRIGL SERPL-MCNC: 74 MG/DL (ref 30–149)
TSI SER-ACNC: 1.46 MIU/ML (ref 0.36–3.74)
UROBILINOGEN UR STRIP-ACNC: <2
VIT C UR-MCNC: NEGATIVE MG/DL
WBC # BLD AUTO: 8.3 X10(3) UL (ref 4–11)
WBC #/AREA URNS AUTO: 4 /HPF

## 2019-02-12 PROCEDURE — 80061 LIPID PANEL: CPT

## 2019-02-12 PROCEDURE — 85025 COMPLETE CBC W/AUTO DIFF WBC: CPT

## 2019-02-12 PROCEDURE — 80053 COMPREHEN METABOLIC PANEL: CPT

## 2019-02-12 PROCEDURE — 99213 OFFICE O/P EST LOW 20 MIN: CPT | Performed by: INTERNAL MEDICINE

## 2019-02-12 PROCEDURE — 99212 OFFICE O/P EST SF 10 MIN: CPT | Performed by: INTERNAL MEDICINE

## 2019-02-12 PROCEDURE — 99397 PER PM REEVAL EST PAT 65+ YR: CPT | Performed by: INTERNAL MEDICINE

## 2019-02-12 PROCEDURE — 81001 URINALYSIS AUTO W/SCOPE: CPT

## 2019-02-12 PROCEDURE — 84443 ASSAY THYROID STIM HORMONE: CPT

## 2019-02-12 PROCEDURE — 36415 COLL VENOUS BLD VENIPUNCTURE: CPT

## 2019-02-12 RX ORDER — BUPROPION HYDROCHLORIDE 150 MG/1
150 TABLET, EXTENDED RELEASE ORAL 2 TIMES DAILY
Qty: 60 TABLET | Refills: 0 | Status: SHIPPED | OUTPATIENT
Start: 2019-02-12 | End: 2019-03-12

## 2019-02-12 RX ORDER — ALPRAZOLAM 0.25 MG/1
TABLET ORAL
Qty: 4 TABLET | Refills: 0 | OUTPATIENT
Start: 2019-02-12 | End: 2019-09-17 | Stop reason: ALTCHOICE

## 2019-02-12 NOTE — PROGRESS NOTES
HPI:    Patient ID: Marry Knight is a 70year old female.   Patient presents today for physical exam, states doing well otherwise, denies chest pain, shortness of breath, dyspnea on exertion or heart palpitations also denies nausea, vomiting, diarrhea, pain, palpitations, orthopnea, leg swelling and PND. Gastrointestinal: Negative for abdominal pain, constipation, diarrhea, nausea and vomiting. Genitourinary: Negative for dysuria and frequency. Musculoskeletal: Negative for neck pain.    Skin: Negat distress. She has no wheezes. She has no rales. Abdominal: Soft. She exhibits no mass. There is no hepatosplenomegaly. There is no tenderness. There is no CVA tenderness. Musculoskeletal: She exhibits no edema.    Lymphadenopathy:     She has no cervica mammogram, encounter for    Depression, unspecified depression type  Start  bupropion  150 mg twice daily patient was on the medicine previously and did not work for her    Counseling for the smoking sensation as well provided to patient  She refused at th

## 2019-02-22 RX ORDER — LOSARTAN POTASSIUM 50 MG/1
TABLET ORAL
Qty: 60 TABLET | Refills: 0 | Status: SHIPPED | OUTPATIENT
Start: 2019-02-22 | End: 2019-03-21

## 2019-03-02 ENCOUNTER — HOSPITAL ENCOUNTER (OUTPATIENT)
Dept: MAMMOGRAPHY | Age: 64
Discharge: HOME | End: 2019-03-02
Attending: INTERNAL MEDICINE
Payer: COMMERCIAL

## 2019-03-02 DIAGNOSIS — Z12.31 SCREENING MAMMOGRAM, ENCOUNTER FOR: ICD-10-CM

## 2019-03-02 PROCEDURE — 77067 SCR MAMMO BI INCL CAD: CPT | Performed by: INTERNAL MEDICINE

## 2019-03-02 PROCEDURE — 77063 BREAST TOMOSYNTHESIS BI: CPT | Performed by: INTERNAL MEDICINE

## 2019-03-08 ENCOUNTER — OFFICE VISIT (OUTPATIENT)
Dept: INTERNAL MEDICINE CLINIC | Facility: CLINIC | Age: 64
End: 2019-03-08
Payer: COMMERCIAL

## 2019-03-08 VITALS
HEART RATE: 90 BPM | HEIGHT: 63 IN | TEMPERATURE: 98 F | RESPIRATION RATE: 18 BRPM | BODY MASS INDEX: 37.56 KG/M2 | DIASTOLIC BLOOD PRESSURE: 69 MMHG | SYSTOLIC BLOOD PRESSURE: 107 MMHG | WEIGHT: 212 LBS

## 2019-03-08 DIAGNOSIS — I89.0 LYMPHEDEMA OF LEFT LEG: ICD-10-CM

## 2019-03-08 DIAGNOSIS — I10 ESSENTIAL HYPERTENSION: Primary | ICD-10-CM

## 2019-03-08 DIAGNOSIS — M79.89 LEG SWELLING: ICD-10-CM

## 2019-03-08 DIAGNOSIS — C43.9 MALIGNANT MELANOMA, UNSPECIFIED SITE (HCC): ICD-10-CM

## 2019-03-08 PROCEDURE — 99212 OFFICE O/P EST SF 10 MIN: CPT | Performed by: INTERNAL MEDICINE

## 2019-03-08 PROCEDURE — 99214 OFFICE O/P EST MOD 30 MIN: CPT | Performed by: INTERNAL MEDICINE

## 2019-03-08 NOTE — PROGRESS NOTES
HPI:    Patient ID: Gertrudis Owens is a 70year old female. Patient presents with:  Hypertension: Patient present for follow up for HTN      Patient in office today for bp follow up visit. States feeling well otherwise.   Lost some  Weight   With better TAKE 2 TABLETS BY MOUTH EVERY DAY Disp: 60 tablet Rfl: 0   ALPRAZolam 0.25 MG Oral Tab Take 1-2  Tab 60 min  And  30  Min  Before   MRI Disp: 4 tablet Rfl: 0   BuPROPion HCl ER, SR, 150 MG Oral Tablet 12 Hr Take 1 tablet (150 mg total) by mouth 2 (two) elvia normal.   Nursing note and vitals reviewed. Blood pressure 107/69, pulse 90, temperature 97.8 °F (36.6 °C), temperature source Oral, resp. rate 18, height 5' 3\" (1.6 m), weight 212 lb (96.2 kg).              ASSESSMENT/PLAN:   Essential hypertension  (p

## 2019-03-09 PROBLEM — I89.0 LYMPHEDEMA OF LEFT LEG: Status: ACTIVE | Noted: 2019-03-09

## 2019-03-12 RX ORDER — BUPROPION HYDROCHLORIDE 150 MG/1
TABLET, EXTENDED RELEASE ORAL
Qty: 60 TABLET | Refills: 0 | Status: SHIPPED | OUTPATIENT
Start: 2019-03-12 | End: 2019-04-11

## 2019-03-22 RX ORDER — LOSARTAN POTASSIUM 50 MG/1
TABLET ORAL
Qty: 60 TABLET | Refills: 2 | Status: SHIPPED | OUTPATIENT
Start: 2019-03-22 | End: 2019-06-21

## 2019-04-11 RX ORDER — BUPROPION HYDROCHLORIDE 150 MG/1
TABLET, EXTENDED RELEASE ORAL
Qty: 180 TABLET | Refills: 1 | Status: SHIPPED | OUTPATIENT
Start: 2019-04-11 | End: 2019-10-15

## 2019-06-21 RX ORDER — LOSARTAN POTASSIUM 50 MG/1
TABLET ORAL
Qty: 180 TABLET | Refills: 1 | Status: SHIPPED | OUTPATIENT
Start: 2019-06-21 | End: 2019-12-12

## 2019-06-21 NOTE — TELEPHONE ENCOUNTER
Refill passed per [facility] protocol.   Hypertensive Medications  Protocol Criteria:  · Appointment scheduled in the past 6 months or in the next 3 months  · BMP or CMP in the past 12 months  · Creatinine result < 2  Recent Outpatient Visits

## 2019-09-17 ENCOUNTER — OFFICE VISIT (OUTPATIENT)
Dept: INTERNAL MEDICINE CLINIC | Facility: CLINIC | Age: 64
End: 2019-09-17
Payer: COMMERCIAL

## 2019-09-17 VITALS
HEART RATE: 69 BPM | BODY MASS INDEX: 37.05 KG/M2 | HEIGHT: 63 IN | OXYGEN SATURATION: 96 % | WEIGHT: 209.13 LBS | SYSTOLIC BLOOD PRESSURE: 132 MMHG | DIASTOLIC BLOOD PRESSURE: 86 MMHG | TEMPERATURE: 98 F | RESPIRATION RATE: 20 BRPM

## 2019-09-17 DIAGNOSIS — Z23 ENCOUNTER FOR VACCINATION: ICD-10-CM

## 2019-09-17 DIAGNOSIS — F17.200 SMOKING: ICD-10-CM

## 2019-09-17 DIAGNOSIS — C43.9 MALIGNANT MELANOMA, UNSPECIFIED SITE (HCC): ICD-10-CM

## 2019-09-17 DIAGNOSIS — I10 ESSENTIAL HYPERTENSION: Primary | ICD-10-CM

## 2019-09-17 DIAGNOSIS — L98.9 SKIN LESION: ICD-10-CM

## 2019-09-17 PROCEDURE — 90471 IMMUNIZATION ADMIN: CPT | Performed by: INTERNAL MEDICINE

## 2019-09-17 PROCEDURE — 90732 PPSV23 VACC 2 YRS+ SUBQ/IM: CPT | Performed by: INTERNAL MEDICINE

## 2019-09-17 PROCEDURE — 99214 OFFICE O/P EST MOD 30 MIN: CPT | Performed by: INTERNAL MEDICINE

## 2019-09-17 NOTE — PROGRESS NOTES
HPI:    Patient ID: Sami Dumont is a 75year old female.   Patient presents with:  Hypertension: Pt state that she has been having elevated blood pressure lately with slight headache    Patient in office today for HTN-patient states her blood pressure Disp: 180 tablet Rfl: 1   BUPROPION HCL ER, SR, 150 MG Oral Tablet 12 Hr TAKE 1 TABLET BY MOUTH TWICE A DAY Disp: 180 tablet Rfl: 1   Elastic Bandages & Supports (SUPPORT COMPRESSION SOCK MENS) Does not apply Misc 20 mm by Does not apply route daily.  20-30 lb 1.6 oz (94.8 kg), SpO2 96 %. ASSESSMENT/PLAN:     1.  Essential hypertension  Take high blood pressure medication as perscribed   Low- sodium diet   Maintain walking - as tolerated   Track and record blood pressure and heart rate at home   The

## 2019-10-15 RX ORDER — BUPROPION HYDROCHLORIDE 150 MG/1
TABLET, EXTENDED RELEASE ORAL
Qty: 180 TABLET | Refills: 1 | Status: SHIPPED | OUTPATIENT
Start: 2019-10-15 | End: 2020-04-20

## 2019-10-16 NOTE — TELEPHONE ENCOUNTER
Refill passed per [facility] protocol.   Refill Protocol Appointment Criteria  · Appointment scheduled in the past 6 months or in the next 3 months  Recent Outpatient Visits            4 weeks ago Essential hypertension    [facility], [address] [place] [address],

## 2019-12-12 RX ORDER — LOSARTAN POTASSIUM 50 MG/1
TABLET ORAL
Qty: 180 TABLET | Refills: 1 | Status: SHIPPED | OUTPATIENT
Start: 2019-12-12 | End: 2020-01-28 | Stop reason: ALTCHOICE

## 2019-12-12 NOTE — TELEPHONE ENCOUNTER
Hypertensive Medications  Refill passed per [facility] protocol.     Protocol Criteria:  · Appointment scheduled in the past 6 months or in the next 3 months  · BMP or CMP in the past 12 months  · Creatinine result < 2  Recent Outpatient Visits

## 2020-01-23 ENCOUNTER — OFFICE VISIT (OUTPATIENT)
Dept: DERMATOLOGY CLINIC | Facility: CLINIC | Age: 65
End: 2020-01-23
Payer: COMMERCIAL

## 2020-01-23 DIAGNOSIS — D23.30 BENIGN NEOPLASM OF SKIN OF FACE: ICD-10-CM

## 2020-01-23 DIAGNOSIS — D23.4 BENIGN NEOPLASM OF SCALP AND SKIN OF NECK: ICD-10-CM

## 2020-01-23 DIAGNOSIS — D48.5 NEOPLASM OF UNCERTAIN BEHAVIOR OF SKIN: Primary | ICD-10-CM

## 2020-01-23 DIAGNOSIS — D23.60 BENIGN NEOPLASM OF SKIN OF UPPER LIMB, INCLUDING SHOULDER, UNSPECIFIED LATERALITY: ICD-10-CM

## 2020-01-23 DIAGNOSIS — Z85.820 PERSONAL HISTORY OF MALIGNANT MELANOMA OF SKIN: ICD-10-CM

## 2020-01-23 PROCEDURE — 11102 TANGNTL BX SKIN SINGLE LES: CPT | Performed by: DERMATOLOGY

## 2020-01-23 PROCEDURE — 88305 TISSUE EXAM BY PATHOLOGIST: CPT | Performed by: DERMATOLOGY

## 2020-01-23 PROCEDURE — 99202 OFFICE O/P NEW SF 15 MIN: CPT | Performed by: DERMATOLOGY

## 2020-01-28 ENCOUNTER — TELEPHONE (OUTPATIENT)
Dept: INTERNAL MEDICINE CLINIC | Facility: CLINIC | Age: 65
End: 2020-01-28

## 2020-01-28 RX ORDER — LOSARTAN POTASSIUM 100 MG/1
100 TABLET ORAL DAILY
Qty: 90 TABLET | Refills: 0 | Status: SHIPPED | OUTPATIENT
Start: 2020-01-28 | End: 2020-04-20

## 2020-01-28 NOTE — PROGRESS NOTES
The pathology report from last visit showed  left preauricular cheek-Seborrheic keratosis, irritated . Please log in test results, send biopsy results letter. Pt to rtc 1 year or prn.

## 2020-01-28 NOTE — TELEPHONE ENCOUNTER
We can change the losartan 50 mg 2tablets to 100 mg tablet     patient can get 90-day supply     patient is due for the blood test please advise patient to have  blood test as soon as possible and follow-up in 2 to 3 weeks after the blood test is completed

## 2020-01-28 NOTE — TELEPHONE ENCOUNTER
Patient confirms has been taking Losartan 50mg 2 tabs daily. Advised of change due to back order, patient agreed. New script pended below, please advise.

## 2020-01-28 NOTE — TELEPHONE ENCOUNTER
Hina/ Pharmacist of [place] [address] is calling regarding patient's medication, LOSARTAN POTASSIUM 50 MG Oral Tab. Pharmacist states medication is on backorder and is requesting if dosage can be changed. Pharmacist states 25 or 100 is available.     Please a

## 2020-02-03 NOTE — PROGRESS NOTES
Operative Report                     Shave/  Tangential biopsy     Clinical diagnosis:    Size of lesion:Diagnosis/Clinical History: pt with hx melanoma subungual new brown lesion growing  Spec 1 Description >>>>>: left pre-auricular cheek  Spec 1 Co
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
## 2020-04-20 RX ORDER — LOSARTAN POTASSIUM 100 MG/1
TABLET ORAL
Qty: 30 TABLET | Refills: 2 | Status: SHIPPED | OUTPATIENT
Start: 2020-04-20 | End: 2020-07-21

## 2020-04-20 RX ORDER — BUPROPION HYDROCHLORIDE 150 MG/1
TABLET, EXTENDED RELEASE ORAL
Qty: 60 TABLET | Refills: 5 | Status: SHIPPED | OUTPATIENT
Start: 2020-04-20 | End: 2021-06-16

## 2020-07-21 RX ORDER — LOSARTAN POTASSIUM 100 MG/1
TABLET ORAL
Qty: 30 TABLET | Refills: 2 | Status: SHIPPED | OUTPATIENT
Start: 2020-07-21 | End: 2020-10-14

## 2020-09-30 ENCOUNTER — TELEPHONE (OUTPATIENT)
Dept: INTERNAL MEDICINE CLINIC | Facility: CLINIC | Age: 65
End: 2020-09-30

## 2020-09-30 NOTE — TELEPHONE ENCOUNTER
pre surgical visit Dr. Adriana Perez Podatrist October 7 left foot     Patient need to come in for a pre op visit can we use a res she states any location and any time       Can a res be use thank you      Please advise   [phone]    Please reply to pool: FRANTZ Rivers

## 2020-10-01 ENCOUNTER — TELEPHONE (OUTPATIENT)
Dept: INTERNAL MEDICINE CLINIC | Facility: CLINIC | Age: 65
End: 2020-10-01

## 2020-10-01 DIAGNOSIS — Z00.00 PE (PHYSICAL EXAM), ROUTINE: Primary | ICD-10-CM

## 2020-10-01 NOTE — TELEPHONE ENCOUNTER
S/w pt. Informed her that Dr. Damián Andrews is currently out of the office, and does not return to the office until Monday. She is requesting to have an order for blood tests for an upcoming surgery.   Stts that she is scheduled to have surgery on 10/7/20 o

## 2020-10-01 NOTE — TELEPHONE ENCOUNTER
Patient calling back checking status of appointment the Surgical center call her for instructions so she just checking the status         Please advise   [phone]

## 2020-10-01 NOTE — TELEPHONE ENCOUNTER
Central Scheduling calling in, patient attempted to schedule her blood work but there are no orders in system.  Patient is going to call back tomorrow to the lab to schedule please order ASAP

## 2020-10-05 NOTE — TELEPHONE ENCOUNTER
Spoke with patient (name and  verified). Informed of message below. Will schedule an appointment with Central Scheduling for blood work.

## 2020-10-05 NOTE — TELEPHONE ENCOUNTER
Per patient she states that she don't need a pre-op anymore, all she needs is the blood work Orders for her yearly physical which she has appointment on 10/14/2020.

## 2020-10-05 NOTE — TELEPHONE ENCOUNTER
-  Please see message below and advise if labs are required prior to the annual physical appointment. Thank you.

## 2020-10-06 NOTE — TELEPHONE ENCOUNTER
Already addressed with another nurse-Dina (see below).     Future Appointments   Date Time Provider Tre Berry   10/14/2020  1:40 PM Haleigh Ramirez MD [facility] [place]

## 2020-10-07 ENCOUNTER — LAB REQUISITION (OUTPATIENT)
Dept: LAB | Facility: HOSPITAL | Age: 65
End: 2020-10-07
Payer: COMMERCIAL

## 2020-10-07 DIAGNOSIS — Z85.820 PERSONAL HISTORY OF MALIGNANT MELANOMA OF SKIN: ICD-10-CM

## 2020-10-07 PROCEDURE — 88342 IMHCHEM/IMCYTCHM 1ST ANTB: CPT | Performed by: PODIATRIST

## 2020-10-07 PROCEDURE — 88305 TISSUE EXAM BY PATHOLOGIST: CPT | Performed by: PODIATRIST

## 2020-10-12 ENCOUNTER — LAB ENCOUNTER (OUTPATIENT)
Dept: LAB | Age: 65
End: 2020-10-12
Attending: INTERNAL MEDICINE
Payer: COMMERCIAL

## 2020-10-12 DIAGNOSIS — Z00.00 PE (PHYSICAL EXAM), ROUTINE: ICD-10-CM

## 2020-10-12 PROCEDURE — 84443 ASSAY THYROID STIM HORMONE: CPT

## 2020-10-12 PROCEDURE — 81001 URINALYSIS AUTO W/SCOPE: CPT | Performed by: INTERNAL MEDICINE

## 2020-10-12 PROCEDURE — 80061 LIPID PANEL: CPT

## 2020-10-12 PROCEDURE — 80053 COMPREHEN METABOLIC PANEL: CPT

## 2020-10-12 PROCEDURE — 36415 COLL VENOUS BLD VENIPUNCTURE: CPT

## 2020-10-12 PROCEDURE — 85025 COMPLETE CBC W/AUTO DIFF WBC: CPT

## 2020-10-14 ENCOUNTER — OFFICE VISIT (OUTPATIENT)
Dept: INTERNAL MEDICINE CLINIC | Facility: CLINIC | Age: 65
End: 2020-10-14
Payer: COMMERCIAL

## 2020-10-14 VITALS
WEIGHT: 211 LBS | SYSTOLIC BLOOD PRESSURE: 138 MMHG | BODY MASS INDEX: 37.39 KG/M2 | HEART RATE: 77 BPM | HEIGHT: 63 IN | DIASTOLIC BLOOD PRESSURE: 71 MMHG

## 2020-10-14 DIAGNOSIS — Z00.00 PE (PHYSICAL EXAM), ROUTINE: Primary | ICD-10-CM

## 2020-10-14 DIAGNOSIS — Z90.81 HX OF SPLENECTOMY: ICD-10-CM

## 2020-10-14 DIAGNOSIS — I10 ESSENTIAL HYPERTENSION: ICD-10-CM

## 2020-10-14 PROCEDURE — 99397 PER PM REEVAL EST PAT 65+ YR: CPT | Performed by: INTERNAL MEDICINE

## 2020-10-14 PROCEDURE — 3075F SYST BP GE 130 - 139MM HG: CPT | Performed by: INTERNAL MEDICINE

## 2020-10-14 PROCEDURE — 99212 OFFICE O/P EST SF 10 MIN: CPT | Performed by: INTERNAL MEDICINE

## 2020-10-14 PROCEDURE — 3008F BODY MASS INDEX DOCD: CPT | Performed by: INTERNAL MEDICINE

## 2020-10-14 PROCEDURE — 3078F DIAST BP <80 MM HG: CPT | Performed by: INTERNAL MEDICINE

## 2020-10-14 RX ORDER — LOSARTAN POTASSIUM 100 MG/1
100 TABLET ORAL DAILY
Qty: 30 TABLET | Refills: 5 | Status: SHIPPED | OUTPATIENT
Start: 2020-10-14 | End: 2021-04-16

## 2020-10-14 RX ORDER — TRAMADOL HYDROCHLORIDE 50 MG/1
TABLET ORAL
COMMUNITY
Start: 2020-10-06 | End: 2021-01-26

## 2020-10-14 NOTE — PROGRESS NOTES
HPI:    Patient ID: Marry Knight is a 77year old female. Patient presents with:  Physical: Labs completed on 10/12    Patient in office today for   Physical exam ,HTN-patient states her blood pressure  Is in   Normal  Range .   Had foot callus - bx Does not apply route daily.  20-30  Mm   Hg pressure   Knee  High    Lymphedema 2 each 2   • BUPROPION HCL ER, SR, 150 MG Oral Tablet 12 Hr TAKE 1 TABLET BY MOUTH TWICE A DAY (Patient not taking: Reported on 10/14/2020) 60 tablet 5     Allergies:No Known Al hydration  Maintain a regular activity /walking as tolerated   Complete labs  Discussed with pt -  10 /20   mammogram   Due 3/21 , coloposcopy  - due  To 23   Preventative health maintenance tests reviewed   Immunizations reviewed  - flu shot refused , ne

## 2020-10-23 ENCOUNTER — MED REC SCAN ONLY (OUTPATIENT)
Dept: INTERNAL MEDICINE CLINIC | Facility: CLINIC | Age: 65
End: 2020-10-23

## 2021-01-26 ENCOUNTER — LAB ENCOUNTER (OUTPATIENT)
Dept: LAB | Age: 66
End: 2021-01-26
Attending: INTERNAL MEDICINE
Payer: MEDICARE

## 2021-01-26 ENCOUNTER — OFFICE VISIT (OUTPATIENT)
Dept: INTERNAL MEDICINE CLINIC | Facility: CLINIC | Age: 66
End: 2021-01-26
Payer: MEDICARE

## 2021-01-26 VITALS
BODY MASS INDEX: 35.79 KG/M2 | SYSTOLIC BLOOD PRESSURE: 139 MMHG | WEIGHT: 202 LBS | HEIGHT: 63 IN | DIASTOLIC BLOOD PRESSURE: 83 MMHG | HEART RATE: 69 BPM

## 2021-01-26 DIAGNOSIS — F17.200 TOBACCO DEPENDENCE: ICD-10-CM

## 2021-01-26 DIAGNOSIS — I10 ESSENTIAL HYPERTENSION: Primary | ICD-10-CM

## 2021-01-26 DIAGNOSIS — I10 ESSENTIAL HYPERTENSION: ICD-10-CM

## 2021-01-26 DIAGNOSIS — R94.31 ABNORMAL EKG: ICD-10-CM

## 2021-01-26 PROCEDURE — 3075F SYST BP GE 130 - 139MM HG: CPT | Performed by: INTERNAL MEDICINE

## 2021-01-26 PROCEDURE — 3008F BODY MASS INDEX DOCD: CPT | Performed by: INTERNAL MEDICINE

## 2021-01-26 PROCEDURE — 93010 ELECTROCARDIOGRAM REPORT: CPT | Performed by: INTERNAL MEDICINE

## 2021-01-26 PROCEDURE — 93005 ELECTROCARDIOGRAM TRACING: CPT

## 2021-01-26 PROCEDURE — 99214 OFFICE O/P EST MOD 30 MIN: CPT | Performed by: INTERNAL MEDICINE

## 2021-01-26 PROCEDURE — 3079F DIAST BP 80-89 MM HG: CPT | Performed by: INTERNAL MEDICINE

## 2021-01-26 RX ORDER — VARENICLINE TARTRATE 0.5 (11)-1
0.5 KIT ORAL 2 TIMES DAILY
Qty: 1 PACKAGE | Refills: 1 | Status: SHIPPED | OUTPATIENT
Start: 2021-01-26 | End: 2021-02-25

## 2021-01-26 NOTE — PROGRESS NOTES
HPI:    Patient ID: Krunal Aguilera is a 77year old female.   Patient presents with:  Hypertension  Smoking        Patient  Presents for  htn and smoking sensation patient states she is trying to quit smoking but actually now she is smoking even more sin is not nervous/anxious. Current Outpatient Medications   Medication Sig Dispense Refill   • losartan 100 MG Oral Tab Take 1 tablet (100 mg total) by mouth daily.  30 tablet 5   • Elastic Bandages & Supports (SUPPORT COMPRESSION SOCK MENS) Does n affect. Her behavior is normal.   Nursing note and vitals reviewed. Blood pressure 139/83, pulse 69, height 5' 3\" (1.6 m), weight 202 lb (91.6 kg).            ASSESSMENT/PLAN:     Tobacco use   Patient tried the bupropion  mg twice daily for smo

## 2021-01-27 ENCOUNTER — LABORATORY ENCOUNTER (OUTPATIENT)
Dept: LAB | Age: 66
End: 2021-01-27
Attending: INTERNAL MEDICINE
Payer: MEDICARE

## 2021-01-27 DIAGNOSIS — I10 ESSENTIAL HYPERTENSION: ICD-10-CM

## 2021-01-27 LAB
ALBUMIN SERPL-MCNC: 3.6 G/DL (ref 3.4–5)
ALBUMIN/GLOB SERPL: 1 {RATIO} (ref 1–2)
ALP LIVER SERPL-CCNC: 60 U/L
ALT SERPL-CCNC: 26 U/L
ANION GAP SERPL CALC-SCNC: 6 MMOL/L (ref 0–18)
AST SERPL-CCNC: 15 U/L (ref 15–37)
BASOPHILS # BLD AUTO: 0.08 X10(3) UL (ref 0–0.2)
BASOPHILS NFR BLD AUTO: 0.9 %
BILIRUB SERPL-MCNC: 0.5 MG/DL (ref 0.1–2)
BILIRUB UR QL: NEGATIVE
BUN BLD-MCNC: 20 MG/DL (ref 7–18)
BUN/CREAT SERPL: 24.4 (ref 10–20)
CALCIUM BLD-MCNC: 9.8 MG/DL (ref 8.5–10.1)
CHLORIDE SERPL-SCNC: 109 MMOL/L (ref 98–112)
CHOLEST SMN-MCNC: 203 MG/DL (ref ?–200)
CO2 SERPL-SCNC: 27 MMOL/L (ref 21–32)
COLOR UR: YELLOW
CREAT BLD-MCNC: 0.82 MG/DL
DEPRECATED RDW RBC AUTO: 48.2 FL (ref 35.1–46.3)
EOSINOPHIL # BLD AUTO: 0.09 X10(3) UL (ref 0–0.7)
EOSINOPHIL NFR BLD AUTO: 1.1 %
ERYTHROCYTE [DISTWIDTH] IN BLOOD BY AUTOMATED COUNT: 15.2 % (ref 11–15)
GLOBULIN PLAS-MCNC: 3.7 G/DL (ref 2.8–4.4)
GLUCOSE BLD-MCNC: 90 MG/DL (ref 70–99)
GLUCOSE UR-MCNC: NEGATIVE MG/DL
HCT VFR BLD AUTO: 42.8 %
HDLC SERPL-MCNC: 78 MG/DL (ref 40–59)
HGB BLD-MCNC: 14.3 G/DL
IMM GRANULOCYTES # BLD AUTO: 0.02 X10(3) UL (ref 0–1)
IMM GRANULOCYTES NFR BLD: 0.2 %
KETONES UR-MCNC: NEGATIVE MG/DL
LDLC SERPL CALC-MCNC: 111 MG/DL (ref ?–100)
LEUKOCYTE ESTERASE UR QL STRIP.AUTO: NEGATIVE
LYMPHOCYTES # BLD AUTO: 1.12 X10(3) UL (ref 1–4)
LYMPHOCYTES NFR BLD AUTO: 13.3 %
M PROTEIN MFR SERPL ELPH: 7.3 G/DL (ref 6.4–8.2)
MCH RBC QN AUTO: 29.2 PG (ref 26–34)
MCHC RBC AUTO-ENTMCNC: 33.4 G/DL (ref 31–37)
MCV RBC AUTO: 87.3 FL
MONOCYTES # BLD AUTO: 1.04 X10(3) UL (ref 0.1–1)
MONOCYTES NFR BLD AUTO: 12.3 %
NEUTROPHILS # BLD AUTO: 6.1 X10 (3) UL (ref 1.5–7.7)
NEUTROPHILS # BLD AUTO: 6.1 X10(3) UL (ref 1.5–7.7)
NEUTROPHILS NFR BLD AUTO: 72.2 %
NITRITE UR QL STRIP.AUTO: NEGATIVE
NONHDLC SERPL-MCNC: 125 MG/DL (ref ?–130)
OSMOLALITY SERPL CALC.SUM OF ELEC: 296 MOSM/KG (ref 275–295)
PATIENT FASTING Y/N/NP: YES
PATIENT FASTING Y/N/NP: YES
PH UR: 6 [PH] (ref 5–8)
PLATELET # BLD AUTO: 361 10(3)UL (ref 150–450)
POTASSIUM SERPL-SCNC: 4.2 MMOL/L (ref 3.5–5.1)
PROT UR-MCNC: NEGATIVE MG/DL
RBC # BLD AUTO: 4.9 X10(6)UL
RBC #/AREA URNS AUTO: 1 /HPF
SODIUM SERPL-SCNC: 142 MMOL/L (ref 136–145)
SP GR UR STRIP: 1.02 (ref 1–1.03)
TRIGL SERPL-MCNC: 71 MG/DL (ref 30–149)
TSI SER-ACNC: 1.19 MIU/ML (ref 0.36–3.74)
UROBILINOGEN UR STRIP-ACNC: <2
VLDLC SERPL CALC-MCNC: 14 MG/DL (ref 0–30)
WBC # BLD AUTO: 8.5 X10(3) UL (ref 4–11)
WBC #/AREA URNS AUTO: 1 /HPF

## 2021-01-27 PROCEDURE — 81001 URINALYSIS AUTO W/SCOPE: CPT | Performed by: INTERNAL MEDICINE

## 2021-01-27 PROCEDURE — 36415 COLL VENOUS BLD VENIPUNCTURE: CPT

## 2021-01-27 PROCEDURE — 80053 COMPREHEN METABOLIC PANEL: CPT

## 2021-01-27 PROCEDURE — 85025 COMPLETE CBC W/AUTO DIFF WBC: CPT

## 2021-01-27 PROCEDURE — 80061 LIPID PANEL: CPT

## 2021-01-27 PROCEDURE — 84443 ASSAY THYROID STIM HORMONE: CPT

## 2021-02-01 ENCOUNTER — TELEPHONE (OUTPATIENT)
Dept: INTERNAL MEDICINE CLINIC | Facility: CLINIC | Age: 66
End: 2021-02-01

## 2021-02-01 DIAGNOSIS — Z23 NEED FOR VACCINATION: ICD-10-CM

## 2021-02-01 NOTE — TELEPHONE ENCOUNTER
Message from [facility] regarding:  Chantix is not covered by insurance. A covered alternative may be available If clinically appropriate, you may change the prescription.  Covered formulary alternatives may include  Nicotine 21mg/24 hr pt 24  Bupropion HCo (smoki

## 2021-02-01 NOTE — TELEPHONE ENCOUNTER
Prior authorization for Chantix completed w/ [place] on cover my meds Key: [facility], turn around time 1-5 days.

## 2021-02-02 NOTE — TELEPHONE ENCOUNTER
Prior authorization for chantix starting month maddie has been approved from 1/1/21 through 7/31/21 pharmacy has been notified.      [employer] message was sent to patient

## 2021-02-08 ENCOUNTER — IMMUNIZATION (OUTPATIENT)
Dept: LAB | Facility: HOSPITAL | Age: 66
End: 2021-02-08
Attending: HOSPITALIST
Payer: MEDICARE

## 2021-02-08 DIAGNOSIS — Z23 NEED FOR VACCINATION: Primary | ICD-10-CM

## 2021-02-08 PROCEDURE — 0011A SARSCOV2 VAC 100MCG/0.5ML IM: CPT

## 2021-02-09 ENCOUNTER — MED REC SCAN ONLY (OUTPATIENT)
Dept: INTERNAL MEDICINE CLINIC | Facility: CLINIC | Age: 66
End: 2021-02-09

## 2021-03-08 ENCOUNTER — IMMUNIZATION (OUTPATIENT)
Dept: LAB | Facility: HOSPITAL | Age: 66
End: 2021-03-08
Attending: EMERGENCY MEDICINE
Payer: MEDICARE

## 2021-03-08 DIAGNOSIS — Z23 NEED FOR VACCINATION: Primary | ICD-10-CM

## 2021-03-08 PROCEDURE — 0012A SARSCOV2 VAC 100MCG/0.5ML IM: CPT

## 2021-04-16 RX ORDER — LOSARTAN POTASSIUM 100 MG/1
TABLET ORAL
Qty: 30 TABLET | Refills: 5 | Status: SHIPPED | OUTPATIENT
Start: 2021-04-16 | End: 2021-10-08

## 2021-06-08 NOTE — ADDENDUM NOTE
Addended by: Philly Form on: 10/5/2020 12:38 PM     Modules accepted: Orders Decrease to schedule listed

## 2021-06-16 ENCOUNTER — APPOINTMENT (OUTPATIENT)
Dept: CT IMAGING | Facility: HOSPITAL | Age: 66
End: 2021-06-16
Attending: EMERGENCY MEDICINE
Payer: MEDICARE

## 2021-06-16 ENCOUNTER — APPOINTMENT (OUTPATIENT)
Dept: GENERAL RADIOLOGY | Facility: HOSPITAL | Age: 66
End: 2021-06-16
Attending: EMERGENCY MEDICINE
Payer: MEDICARE

## 2021-06-16 ENCOUNTER — OFFICE VISIT (OUTPATIENT)
Dept: INTERNAL MEDICINE CLINIC | Facility: CLINIC | Age: 66
End: 2021-06-16
Payer: MEDICARE

## 2021-06-16 ENCOUNTER — HOSPITAL ENCOUNTER (EMERGENCY)
Facility: HOSPITAL | Age: 66
Discharge: HOME | End: 2021-06-16
Attending: EMERGENCY MEDICINE
Payer: MEDICARE

## 2021-06-16 VITALS
HEIGHT: 63 IN | WEIGHT: 187 LBS | DIASTOLIC BLOOD PRESSURE: 102 MMHG | BODY MASS INDEX: 33.13 KG/M2 | TEMPERATURE: 99 F | SYSTOLIC BLOOD PRESSURE: 183 MMHG | HEART RATE: 84 BPM

## 2021-06-16 VITALS
HEART RATE: 98 BPM | BODY MASS INDEX: 33.13 KG/M2 | HEIGHT: 63 IN | OXYGEN SATURATION: 100 % | RESPIRATION RATE: 20 BRPM | TEMPERATURE: 99 F | WEIGHT: 187 LBS | DIASTOLIC BLOOD PRESSURE: 75 MMHG | SYSTOLIC BLOOD PRESSURE: 164 MMHG

## 2021-06-16 DIAGNOSIS — R10.13 EPIGASTRIC PAIN: ICD-10-CM

## 2021-06-16 DIAGNOSIS — R11.2 NAUSEA AND VOMITING IN ADULT: ICD-10-CM

## 2021-06-16 DIAGNOSIS — R10.13 EPIGASTRIC PAIN: Primary | ICD-10-CM

## 2021-06-16 DIAGNOSIS — R11.0 NAUSEA: Primary | ICD-10-CM

## 2021-06-16 DIAGNOSIS — I10 ESSENTIAL HYPERTENSION: ICD-10-CM

## 2021-06-16 PROCEDURE — 96374 THER/PROPH/DIAG INJ IV PUSH: CPT

## 2021-06-16 PROCEDURE — 96372 THER/PROPH/DIAG INJ SC/IM: CPT | Performed by: INTERNAL MEDICINE

## 2021-06-16 PROCEDURE — 96375 TX/PRO/DX INJ NEW DRUG ADDON: CPT

## 2021-06-16 PROCEDURE — 93010 ELECTROCARDIOGRAM REPORT: CPT | Performed by: EMERGENCY MEDICINE

## 2021-06-16 PROCEDURE — 71045 X-RAY EXAM CHEST 1 VIEW: CPT | Performed by: EMERGENCY MEDICINE

## 2021-06-16 PROCEDURE — 3080F DIAST BP >= 90 MM HG: CPT | Performed by: INTERNAL MEDICINE

## 2021-06-16 PROCEDURE — 99214 OFFICE O/P EST MOD 30 MIN: CPT | Performed by: INTERNAL MEDICINE

## 2021-06-16 PROCEDURE — 99284 EMERGENCY DEPT VISIT MOD MDM: CPT

## 2021-06-16 PROCEDURE — 85025 COMPLETE CBC W/AUTO DIFF WBC: CPT | Performed by: EMERGENCY MEDICINE

## 2021-06-16 PROCEDURE — 93005 ELECTROCARDIOGRAM TRACING: CPT

## 2021-06-16 PROCEDURE — 80053 COMPREHEN METABOLIC PANEL: CPT | Performed by: EMERGENCY MEDICINE

## 2021-06-16 PROCEDURE — 3008F BODY MASS INDEX DOCD: CPT | Performed by: INTERNAL MEDICINE

## 2021-06-16 PROCEDURE — 3077F SYST BP >= 140 MM HG: CPT | Performed by: INTERNAL MEDICINE

## 2021-06-16 PROCEDURE — 83690 ASSAY OF LIPASE: CPT | Performed by: EMERGENCY MEDICINE

## 2021-06-16 PROCEDURE — 96361 HYDRATE IV INFUSION ADD-ON: CPT

## 2021-06-16 PROCEDURE — 84484 ASSAY OF TROPONIN QUANT: CPT | Performed by: EMERGENCY MEDICINE

## 2021-06-16 RX ORDER — PROCHLORPERAZINE EDISYLATE 5 MG/ML
10 INJECTION INTRAMUSCULAR; INTRAVENOUS ONCE
Status: COMPLETED | OUTPATIENT
Start: 2021-06-16 | End: 2021-06-16

## 2021-06-16 RX ORDER — ONDANSETRON 2 MG/ML
2 INJECTION INTRAMUSCULAR; INTRAVENOUS ONCE
Status: COMPLETED | OUTPATIENT
Start: 2021-06-16 | End: 2021-06-16

## 2021-06-16 RX ORDER — ONDANSETRON 2 MG/ML
4 INJECTION INTRAMUSCULAR; INTRAVENOUS ONCE
Status: DISCONTINUED | OUTPATIENT
Start: 2021-06-16 | End: 2021-06-16

## 2021-06-16 RX ORDER — ONDANSETRON 4 MG/1
4 TABLET, ORALLY DISINTEGRATING ORAL EVERY 4 HOURS PRN
Qty: 10 TABLET | Refills: 0 | Status: SHIPPED | OUTPATIENT
Start: 2021-06-16 | End: 2021-06-23

## 2021-06-16 RX ORDER — METOCLOPRAMIDE HYDROCHLORIDE 5 MG/ML
10 INJECTION INTRAMUSCULAR; INTRAVENOUS ONCE
Status: COMPLETED | OUTPATIENT
Start: 2021-06-16 | End: 2021-06-16

## 2021-06-16 RX ORDER — ONDANSETRON 2 MG/ML
4 INJECTION INTRAMUSCULAR; INTRAVENOUS ONCE
Status: COMPLETED | OUTPATIENT
Start: 2021-06-16 | End: 2021-06-16

## 2021-06-16 RX ADMIN — ONDANSETRON 2 MG: 2 INJECTION INTRAMUSCULAR; INTRAVENOUS at 11:02:00

## 2021-06-16 NOTE — ED PROVIDER NOTES
Patient Seen in: [facility] [place] [facility] Emergency Department    History   Patient presents with:  Abdomen/Flank Pain    10 HPI    57-year-old female presents the ER with complaint of epigastric pain. Patient with past medical history of hypertension, cancer. Substance and Sexual Activity      Alcohol use: No        Alcohol/week: 0.0 standard drinks      Drug use: No    Other Topics      Concerns:        Caffeine Concern: Yes          coffee, 3 cups daily        Reaction to local anesthetic: No      ROS  All pe right CVA tenderness or left CVA tenderness. Musculoskeletal:         General: Normal range of motion. Cervical back: Normal range of motion and neck supple. Skin:     General: Skin is warm and dry.    Neurological:      Mental Status: She is alert 12:20 PM     Finalized by (CST): Johnna Dalton MD on 6/16/2021 at 12:22 PM          ED Medications Administered:   Medications   ondansetron HCl (ZOFRAN) injection 4 mg (4 mg Intravenous Given 6/16/21 1129)   sodium chloride 0.9% IV bolus 1,000 m adult    Disposition:  Discharge    Follow-up:  Mandi Leary MD  [address] [place] [address]  [phone]    Schedule an appointment as soon as possible for a visit  If symptoms worsen      Medications Prescribed:  Current Discharge Medication

## 2021-06-16 NOTE — PROGRESS NOTES
HPI:    Patient ID: Latha Stapleton is a 74year old female. Patient presents with:  Nausea: C/o nausea and vomiting since 6/12.     Vomiting        Patient today presents with nausea vomiting patient states she feels some heartburns in the stomach and i constipation, diarrhea and rectal pain. Genitourinary: Negative for dysuria and frequency. Musculoskeletal: Negative for arthralgias and neck pain. Skin: Negative for pallor. Neurological: Negative for dizziness and headaches.    Psychiatric/Behavio no abdominal tenderness. Musculoskeletal:      Cervical back: Neck supple. Lymphadenopathy:      Cervical: No cervical adenopathy. Skin:     General: Skin is warm and dry.    Neurological:      Mental Status: She is alert and oriented to person, place

## 2021-06-21 ENCOUNTER — OFFICE VISIT (OUTPATIENT)
Dept: INTERNAL MEDICINE CLINIC | Facility: CLINIC | Age: 66
End: 2021-06-21
Payer: MEDICARE

## 2021-06-21 VITALS
HEIGHT: 63 IN | OXYGEN SATURATION: 96 % | BODY MASS INDEX: 34.2 KG/M2 | SYSTOLIC BLOOD PRESSURE: 124 MMHG | HEART RATE: 75 BPM | WEIGHT: 193 LBS | DIASTOLIC BLOOD PRESSURE: 84 MMHG

## 2021-06-21 DIAGNOSIS — Z90.81 HX OF SPLENECTOMY: ICD-10-CM

## 2021-06-21 DIAGNOSIS — K21.9 GASTROESOPHAGEAL REFLUX DISEASE WITHOUT ESOPHAGITIS: ICD-10-CM

## 2021-06-21 DIAGNOSIS — I10 ESSENTIAL HYPERTENSION: ICD-10-CM

## 2021-06-21 DIAGNOSIS — R10.13 EPIGASTRIC PAIN: Primary | ICD-10-CM

## 2021-06-21 PROCEDURE — 99214 OFFICE O/P EST MOD 30 MIN: CPT | Performed by: INTERNAL MEDICINE

## 2021-06-21 PROCEDURE — 3008F BODY MASS INDEX DOCD: CPT | Performed by: INTERNAL MEDICINE

## 2021-06-21 PROCEDURE — 3074F SYST BP LT 130 MM HG: CPT | Performed by: INTERNAL MEDICINE

## 2021-06-21 PROCEDURE — 3079F DIAST BP 80-89 MM HG: CPT | Performed by: INTERNAL MEDICINE

## 2021-06-21 RX ORDER — PANTOPRAZOLE SODIUM 40 MG/1
40 TABLET, DELAYED RELEASE ORAL
Qty: 90 TABLET | Refills: 0 | Status: SHIPPED | OUTPATIENT
Start: 2021-06-21 | End: 2021-09-12

## 2021-06-21 NOTE — PROGRESS NOTES
HPI:    Patient ID: Diana Levin is a 74year old female.   Patient presents with:  Hypertension    Patient in office today for   ,HTN-patient was recently in the emergency room due to epigastric pain and nausea and vomiting-patient states she recovere Genitourinary: Negative for dysuria and frequency. Musculoskeletal: Negative for neck pain. Skin: Negative for pallor. Neurological: Negative for dizziness and headaches. Psychiatric/Behavioral: Negative for confusion.  The patient is not nervous/ and Rhythm: Normal rate and regular rhythm. Heart sounds: Normal heart sounds. No murmur heard. Pulmonary:      Effort: Pulmonary effort is normal. No respiratory distress. Breath sounds: Normal breath sounds. No wheezing or rales.    Abdomina compliance  Stable patient to continue present medications  Losartan 100 mg daily  Labs discussed with patient  Advised patient to avoid taking any over-the-counter cold and sinus medication due to the fact that she has hypertension and and can elevate the

## 2021-07-08 ENCOUNTER — HOSPITAL ENCOUNTER (OUTPATIENT)
Dept: CV DIAGNOSTICS | Facility: HOSPITAL | Age: 66
Discharge: HOME | End: 2021-07-08
Attending: INTERNAL MEDICINE
Payer: MEDICARE

## 2021-07-08 DIAGNOSIS — I10 ESSENTIAL HYPERTENSION: ICD-10-CM

## 2021-07-08 DIAGNOSIS — R94.31 ABNORMAL EKG: ICD-10-CM

## 2021-07-08 PROCEDURE — 93306 TTE W/DOPPLER COMPLETE: CPT | Performed by: INTERNAL MEDICINE

## 2021-07-15 ENCOUNTER — HOSPITAL ENCOUNTER (OUTPATIENT)
Dept: ULTRASOUND IMAGING | Facility: HOSPITAL | Age: 66
Discharge: HOME | End: 2021-07-15
Attending: INTERNAL MEDICINE
Payer: MEDICARE

## 2021-07-15 DIAGNOSIS — R10.13 EPIGASTRIC PAIN: ICD-10-CM

## 2021-07-15 PROCEDURE — 76700 US EXAM ABDOM COMPLETE: CPT | Performed by: INTERNAL MEDICINE

## 2021-07-20 ENCOUNTER — OFFICE VISIT (OUTPATIENT)
Dept: INTERNAL MEDICINE CLINIC | Facility: CLINIC | Age: 66
End: 2021-07-20
Payer: MEDICARE

## 2021-07-20 VITALS
DIASTOLIC BLOOD PRESSURE: 62 MMHG | HEART RATE: 62 BPM | BODY MASS INDEX: 34.37 KG/M2 | WEIGHT: 194 LBS | HEIGHT: 63 IN | SYSTOLIC BLOOD PRESSURE: 116 MMHG

## 2021-07-20 DIAGNOSIS — I10 ESSENTIAL HYPERTENSION: Primary | ICD-10-CM

## 2021-07-20 DIAGNOSIS — Z12.31 SCREENING MAMMOGRAM, ENCOUNTER FOR: ICD-10-CM

## 2021-07-20 DIAGNOSIS — Z85.820 HX OF MALIGNANT MELANOMA: ICD-10-CM

## 2021-07-20 DIAGNOSIS — D22.9 NUMEROUS SKIN MOLES: ICD-10-CM

## 2021-07-20 PROCEDURE — 3074F SYST BP LT 130 MM HG: CPT | Performed by: INTERNAL MEDICINE

## 2021-07-20 PROCEDURE — 3008F BODY MASS INDEX DOCD: CPT | Performed by: INTERNAL MEDICINE

## 2021-07-20 PROCEDURE — 99213 OFFICE O/P EST LOW 20 MIN: CPT | Performed by: INTERNAL MEDICINE

## 2021-07-20 PROCEDURE — 3078F DIAST BP <80 MM HG: CPT | Performed by: INTERNAL MEDICINE

## 2021-07-20 NOTE — PROGRESS NOTES
HPI:    Patient ID: Royce Cadena is a 74year old female.   Patient presents with:  Hypertension    Patient in office today for   ,HTN  Patient denies nausea vomiting  , denies heartburn as well as the epigastric ache and right upper quadrant ache  Karen Bhardwaj MENS) Does not apply Misc 20 mm by Does not apply route daily.  20-30  Mm   Hg pressure   Knee  High    Lymphedema 2 each 2   • Pantoprazole Sodium 40 MG Oral Tab EC Take 1 tablet (40 mg total) by mouth every morning before breakfast. 1 tab in am  30 - 60 Psychiatric:         Behavior: Behavior normal.         Blood pressure 116/62, pulse 62, height 5' 3\" (1.6 m), weight 194 lb (88 kg). ASSESSMENT/PLAN:     1.   Epigastric pain   Resolved   Discussed with patient lipase troponin CBC CMP some sig Referrals:  [facility] - INTERNAL  [facility] LINDA 2D+3D SCREENING BILAT (CPT=77067/92494)       [id number]

## 2021-08-10 ENCOUNTER — HOSPITAL ENCOUNTER (OUTPATIENT)
Dept: MAMMOGRAPHY | Age: 66
Discharge: HOME | End: 2021-08-10
Attending: INTERNAL MEDICINE
Payer: MEDICARE

## 2021-08-10 DIAGNOSIS — Z12.31 SCREENING MAMMOGRAM, ENCOUNTER FOR: ICD-10-CM

## 2021-08-10 PROCEDURE — 77063 BREAST TOMOSYNTHESIS BI: CPT | Performed by: INTERNAL MEDICINE

## 2021-08-10 PROCEDURE — 77067 SCR MAMMO BI INCL CAD: CPT | Performed by: INTERNAL MEDICINE

## 2021-09-12 RX ORDER — PANTOPRAZOLE SODIUM 40 MG/1
TABLET, DELAYED RELEASE ORAL
Qty: 90 TABLET | Refills: 0 | Status: SHIPPED | OUTPATIENT
Start: 2021-09-12 | End: 2021-09-30

## 2021-09-30 ENCOUNTER — OFFICE VISIT (OUTPATIENT)
Dept: INTERNAL MEDICINE CLINIC | Facility: CLINIC | Age: 66
End: 2021-09-30
Payer: MEDICARE

## 2021-09-30 ENCOUNTER — LAB ENCOUNTER (OUTPATIENT)
Dept: LAB | Age: 66
End: 2021-09-30
Attending: INTERNAL MEDICINE
Payer: MEDICARE

## 2021-09-30 VITALS
HEIGHT: 63 IN | WEIGHT: 190 LBS | DIASTOLIC BLOOD PRESSURE: 78 MMHG | HEART RATE: 72 BPM | BODY MASS INDEX: 33.66 KG/M2 | SYSTOLIC BLOOD PRESSURE: 130 MMHG

## 2021-09-30 DIAGNOSIS — I10 PRIMARY HYPERTENSION: Primary | ICD-10-CM

## 2021-09-30 DIAGNOSIS — Z11.52 ENCOUNTER FOR SCREENING FOR COVID-19: ICD-10-CM

## 2021-09-30 PROCEDURE — 99213 OFFICE O/P EST LOW 20 MIN: CPT | Performed by: INTERNAL MEDICINE

## 2021-09-30 PROCEDURE — 3008F BODY MASS INDEX DOCD: CPT | Performed by: INTERNAL MEDICINE

## 2021-09-30 PROCEDURE — 3078F DIAST BP <80 MM HG: CPT | Performed by: INTERNAL MEDICINE

## 2021-09-30 PROCEDURE — 3075F SYST BP GE 130 - 139MM HG: CPT | Performed by: INTERNAL MEDICINE

## 2021-09-30 NOTE — PROGRESS NOTES
HPI:    Patient ID: Amber Lin is a 74year old female. Patient presents with:  Return To Work: Need a return to work note.   Headache    Patient in office today for   ,HTN and the short headache that she had the day patient states she was told by t Review of Systems   Constitutional: Negative for chills, fatigue, fever and malaise/fatigue. HENT: Negative for ear pain and sore throat. Eyes: Negative for blurred vision, pain and redness.    Respiratory: Negative for cough, shortness of breath midline. No oropharyngeal exudate or posterior oropharyngeal erythema. Eyes:      General: No scleral icterus. Right eye: No discharge. Left eye: No discharge. Neck:      Thyroid: No thyromegaly. Vascular: No JVD.    Cardiovascular: with patient   Patient verbalizes understanding and compliance  Stable patient to continue present medications  Losartan 100 mg daily  Labs discussed with patient  Advised patient to avoid taking any over-the-counter cold and sinus medication due to the fa

## 2021-10-08 RX ORDER — LOSARTAN POTASSIUM 100 MG/1
100 TABLET ORAL DAILY
Qty: 90 TABLET | Refills: 1 | Status: SHIPPED | OUTPATIENT
Start: 2021-10-08

## 2021-10-08 NOTE — TELEPHONE ENCOUNTER
[facility]   Requested Prescriptions   Pending Prescriptions Disp Refills    LOSARTAN 100 MG Oral Tab [Pharmacy Med Name: LOSARTAN POTASSIUM 100 MG TAB] 90 tablet 1     Sig: TAKE 1 TABLET BY MOUTH EVERY DAY        Hypertensi

## 2021-10-27 ENCOUNTER — IMMUNIZATION (OUTPATIENT)
Dept: FAMILY MEDICINE CLINIC | Facility: CLINIC | Age: 66
End: 2021-10-27
Payer: COMMERCIAL

## 2021-10-27 ENCOUNTER — IMMUNIZATION (OUTPATIENT)
Dept: LAB | Facility: HOSPITAL | Age: 66
End: 2021-10-27
Attending: EMERGENCY MEDICINE
Payer: MEDICARE

## 2021-10-27 DIAGNOSIS — Z23 NEED FOR VACCINATION: Primary | ICD-10-CM

## 2021-10-27 PROCEDURE — 90471 IMMUNIZATION ADMIN: CPT | Performed by: PHYSICIAN ASSISTANT

## 2021-10-27 PROCEDURE — 90662 IIV NO PRSV INCREASED AG IM: CPT | Performed by: PHYSICIAN ASSISTANT

## 2021-10-27 PROCEDURE — 0064A SARSCOV2 VAC 50MCG/0.25ML IM: CPT

## 2022-04-04 RX ORDER — LOSARTAN POTASSIUM 100 MG/1
100 TABLET ORAL DAILY
Qty: 90 TABLET | Refills: 0 | Status: SHIPPED | OUTPATIENT
Start: 2022-04-04 | End: 2022-07-10

## 2022-04-04 NOTE — TELEPHONE ENCOUNTER
Please review. Protocol failed / No protocol. 90 day refill given on 04/04/22, appointment needed for further refills.     Requested Prescriptions   Pending Prescriptions Disp Refills    LOSARTAN 100 MG Oral Tab [Pharmacy Med Name: LOSARTAN POTASSIUM 100 MG TAB] 90 tablet 1     Sig: TAKE 1 TABLET BY MOUTH EVERY DAY        Hypertensive Medications Protocol Failed - 4/1/2022  1:17 AM        Failed - Appointment in past 6 or next 3 months        Passed - CMP or BMP in past 12 months        Passed - GFR Non- > 50     Lab Results   Component Value Date    GFRNAA 63 06/16/2021                         Recent Outpatient Visits              6 months ago Primary hypertension    Chelsi Curtis MD    Office Visit    8 months ago Essential hypertension    [address], [place] Gauze, MD    Office Visit    9 months ago Epigastric pain    Chelsi Curtis MD    Office Visit    9 months ago Nausea    [address], [place] Gauze, MD    Office Visit    1 year ago Essential hypertension    [address], [place] Gauze, MD    Office Visit

## 2022-05-28 ENCOUNTER — PATIENT MESSAGE (OUTPATIENT)
Dept: INTERNAL MEDICINE CLINIC | Facility: CLINIC | Age: 67
End: 2022-05-28

## 2022-05-28 DIAGNOSIS — M25.561 RECURRENT PAIN OF RIGHT KNEE: Primary | ICD-10-CM

## 2022-05-29 NOTE — TELEPHONE ENCOUNTER
From: Sandro Duff  To: Frank Victor MD  Sent: 5/28/2022 11:42 AM CDT  Subject: knee pain    I am having an issue with my knee. I did not injury it, but I am having pain and stiffness. Sometimes hard to walk.  I am used to walking 2 - 3 miles per day now only able to do 1,

## 2022-05-30 NOTE — TELEPHONE ENCOUNTER
Noted , witch knee bothers her ? Pt to complete XR knee  first and further recommendation to ortho or physiatrist to follow .

## 2022-06-03 ENCOUNTER — HOSPITAL ENCOUNTER (OUTPATIENT)
Dept: GENERAL RADIOLOGY | Age: 67
Discharge: HOME | End: 2022-06-03
Attending: INTERNAL MEDICINE
Payer: MEDICARE

## 2022-06-03 DIAGNOSIS — M25.561 RECURRENT PAIN OF RIGHT KNEE: ICD-10-CM

## 2022-06-03 PROCEDURE — 73560 X-RAY EXAM OF KNEE 1 OR 2: CPT | Performed by: INTERNAL MEDICINE

## 2022-06-13 ENCOUNTER — OFFICE VISIT (OUTPATIENT)
Dept: ORTHOPEDICS CLINIC | Facility: CLINIC | Age: 67
End: 2022-06-13
Payer: MEDICARE

## 2022-06-13 DIAGNOSIS — M17.11 PRIMARY OSTEOARTHRITIS OF RIGHT KNEE: Primary | ICD-10-CM

## 2022-06-13 RX ORDER — TRIAMCINOLONE ACETONIDE 40 MG/ML
40 INJECTION, SUSPENSION INTRA-ARTICULAR; INTRAMUSCULAR ONCE
Status: COMPLETED | OUTPATIENT
Start: 2022-06-13 | End: 2022-06-13

## 2022-06-13 RX ADMIN — TRIAMCINOLONE ACETONIDE 40 MG: 40 INJECTION, SUSPENSION INTRA-ARTICULAR; INTRAMUSCULAR at 14:12:00

## 2022-07-10 RX ORDER — LOSARTAN POTASSIUM 100 MG/1
TABLET ORAL
Qty: 90 TABLET | Refills: 0 | Status: SHIPPED | OUTPATIENT
Start: 2022-07-10

## 2022-07-11 ENCOUNTER — OFFICE VISIT (OUTPATIENT)
Dept: ORTHOPEDICS CLINIC | Facility: CLINIC | Age: 67
End: 2022-07-11
Payer: MEDICARE

## 2022-07-11 ENCOUNTER — TELEPHONE (OUTPATIENT)
Dept: ORTHOPEDICS CLINIC | Facility: CLINIC | Age: 67
End: 2022-07-11

## 2022-07-11 VITALS — HEIGHT: 63 IN | WEIGHT: 190 LBS | BODY MASS INDEX: 33.66 KG/M2

## 2022-07-11 DIAGNOSIS — M17.12 PRIMARY OSTEOARTHRITIS OF LEFT KNEE: ICD-10-CM

## 2022-07-11 DIAGNOSIS — M25.462 EFFUSION OF LEFT KNEE: Primary | ICD-10-CM

## 2022-07-11 PROCEDURE — 1125F AMNT PAIN NOTED PAIN PRSNT: CPT | Performed by: ORTHOPAEDIC SURGERY

## 2022-07-11 PROCEDURE — 99213 OFFICE O/P EST LOW 20 MIN: CPT | Performed by: ORTHOPAEDIC SURGERY

## 2022-07-11 PROCEDURE — 3008F BODY MASS INDEX DOCD: CPT | Performed by: ORTHOPAEDIC SURGERY

## 2022-07-11 RX ORDER — DIAZEPAM 5 MG/1
TABLET ORAL
Qty: 2 TABLET | Refills: 0 | Status: SHIPPED | OUTPATIENT
Start: 2022-07-11

## 2022-07-11 NOTE — PROGRESS NOTES
[facility] Follow-up Progress Note      Chief Complaint: Persistent right knee pain      History: The patient is a 38-year-old female returning for assessment of her right knee. She underwent treatment for early osteoarthritic changes at this right knee including a corticosteroid shot. This provided only partial temporary relief. She is still struggling with prolonged standing and walking relating a desire to return to her exercise routine which includes walking. She is frustrated with her ongoing symptoms despite initial conservative care. Occasional popping and clicking is reported without obvious warmth, visible swelling or mechanical instability. Physical Exam: Patient has valgus alignment at this knee with antalgic gait pattern. There is mild palpable effusion. Lateral joint line remains tender to palpation with significant patellofemoral crepitus. Extensor mechanism is is intact. Jensen's is painful laterally. Ligaments remained stable. Assessment: Diagnoses and all orders for this visit:    Effusion of left knee  -     MRI KNEE, RIGHT (IIJ=[id number]); Future  -     diazePAM (VALIUM) 5 MG Oral Tab; Take 1 tablet 1 hour prior to procedure and 1 tablet at the time of procedure    Primary osteoarthritis of left knee  -     MRI KNEE, RIGHT (BBA=[id number]); Future  -     diazePAM (VALIUM) 5 MG Oral Tab; Take 1 tablet 1 hour prior to procedure and 1 tablet at the time of procedure      Plan: The patient is still struggling with pain and effusion about the right knee. No late arthritic changes are suspected in a lateral compartment internal derangement cannot be ruled out. I therefore recommend proceeding with an MRI given chronic pain and swelling. Her cortisone injection provided only temporary and partial relief. For now I asked her to stay away from any loading, pivoting or lower extremity exercise activities.   We will therefore submit for precertification of an MRI with a follow-up once test results become available. Icing, over-the-counter anti-inflammatories and activity protection supplemented by the light compression should be beneficial until then. Luh Coley MD  [address] [place] [address] Surgery    The dictation was partially prepared using KEVIN DUARTE [facility] voice recognition software.   Although every attempt is made to correct errors where identified, discrepancies may still exist.

## 2022-07-11 NOTE — TELEPHONE ENCOUNTER
Patient wanted to make sure that the [facility] was being called in for her to the [facility] pharmacy, on file.

## 2022-08-02 ENCOUNTER — APPOINTMENT (OUTPATIENT)
Dept: OTHER | Facility: HOSPITAL | Age: 67
End: 2022-08-02
Attending: PREVENTIVE MEDICINE

## 2022-08-08 ENCOUNTER — HOSPITAL ENCOUNTER (OUTPATIENT)
Dept: MRI IMAGING | Age: 67
Discharge: HOME | End: 2022-08-08
Attending: ORTHOPAEDIC SURGERY
Payer: MEDICARE

## 2022-08-08 DIAGNOSIS — M17.12 PRIMARY OSTEOARTHRITIS OF LEFT KNEE: ICD-10-CM

## 2022-08-08 DIAGNOSIS — M25.462 EFFUSION OF LEFT KNEE: ICD-10-CM

## 2022-09-01 ENCOUNTER — MED REC SCAN ONLY (OUTPATIENT)
Dept: ORTHOPEDICS CLINIC | Facility: CLINIC | Age: 67
End: 2022-09-01

## 2022-09-09 ENCOUNTER — OFFICE VISIT (OUTPATIENT)
Dept: INTERNAL MEDICINE CLINIC | Facility: CLINIC | Age: 67
End: 2022-09-09
Payer: MEDICARE

## 2022-09-09 VITALS
WEIGHT: 201 LBS | BODY MASS INDEX: 35.61 KG/M2 | DIASTOLIC BLOOD PRESSURE: 69 MMHG | SYSTOLIC BLOOD PRESSURE: 131 MMHG | HEART RATE: 78 BPM | HEIGHT: 63 IN

## 2022-09-09 DIAGNOSIS — Z13.820 SCREENING FOR OSTEOPOROSIS: ICD-10-CM

## 2022-09-09 DIAGNOSIS — M25.561 RECURRENT PAIN OF RIGHT KNEE: ICD-10-CM

## 2022-09-09 DIAGNOSIS — Z90.81 HX OF SPLENECTOMY: ICD-10-CM

## 2022-09-09 DIAGNOSIS — Z85.820 HX OF MALIGNANT MELANOMA: ICD-10-CM

## 2022-09-09 DIAGNOSIS — I89.0 LYMPHEDEMA OF LEFT LEG: ICD-10-CM

## 2022-09-09 DIAGNOSIS — K63.5 POLYP OF COLON, UNSPECIFIED PART OF COLON, UNSPECIFIED TYPE: ICD-10-CM

## 2022-09-09 DIAGNOSIS — Z78.0 POSTMENOPAUSAL: ICD-10-CM

## 2022-09-09 DIAGNOSIS — Z00.00 MEDICARE ANNUAL WELLNESS VISIT, SUBSEQUENT: Primary | ICD-10-CM

## 2022-09-09 DIAGNOSIS — I10 ESSENTIAL HYPERTENSION: ICD-10-CM

## 2022-09-09 DIAGNOSIS — Z12.31 SCREENING MAMMOGRAM, ENCOUNTER FOR: ICD-10-CM

## 2022-09-09 DIAGNOSIS — J30.2 SEASONAL ALLERGIC RHINITIS, UNSPECIFIED TRIGGER: ICD-10-CM

## 2022-09-09 DIAGNOSIS — D22.9 NUMEROUS SKIN MOLES: ICD-10-CM

## 2022-09-09 PROCEDURE — 3075F SYST BP GE 130 - 139MM HG: CPT | Performed by: INTERNAL MEDICINE

## 2022-09-09 PROCEDURE — 96160 PT-FOCUSED HLTH RISK ASSMT: CPT | Performed by: INTERNAL MEDICINE

## 2022-09-09 PROCEDURE — 99397 PER PM REEVAL EST PAT 65+ YR: CPT | Performed by: INTERNAL MEDICINE

## 2022-09-09 PROCEDURE — G0439 PPPS, SUBSEQ VISIT: HCPCS | Performed by: INTERNAL MEDICINE

## 2022-09-09 PROCEDURE — 3008F BODY MASS INDEX DOCD: CPT | Performed by: INTERNAL MEDICINE

## 2022-09-09 PROCEDURE — 3078F DIAST BP <80 MM HG: CPT | Performed by: INTERNAL MEDICINE

## 2022-09-09 PROCEDURE — 1125F AMNT PAIN NOTED PAIN PRSNT: CPT | Performed by: INTERNAL MEDICINE

## 2022-09-12 ENCOUNTER — OFFICE VISIT (OUTPATIENT)
Dept: ORTHOPEDICS CLINIC | Facility: CLINIC | Age: 67
End: 2022-09-12
Payer: MEDICARE

## 2022-09-12 ENCOUNTER — LAB ENCOUNTER (OUTPATIENT)
Dept: LAB | Age: 67
End: 2022-09-12
Attending: INTERNAL MEDICINE
Payer: MEDICARE

## 2022-09-12 DIAGNOSIS — I10 ESSENTIAL HYPERTENSION: ICD-10-CM

## 2022-09-12 DIAGNOSIS — M25.462 EFFUSION OF LEFT KNEE: Primary | ICD-10-CM

## 2022-09-12 DIAGNOSIS — M25.561 CHRONIC PAIN OF RIGHT KNEE: ICD-10-CM

## 2022-09-12 DIAGNOSIS — M17.11 PRIMARY OSTEOARTHRITIS OF RIGHT KNEE: ICD-10-CM

## 2022-09-12 DIAGNOSIS — G89.29 CHRONIC PAIN OF RIGHT KNEE: ICD-10-CM

## 2022-09-12 DIAGNOSIS — M25.561 RECURRENT PAIN OF RIGHT KNEE: ICD-10-CM

## 2022-09-12 LAB
ALBUMIN SERPL-MCNC: 3.8 G/DL (ref 3.4–5)
ALBUMIN/GLOB SERPL: 1.2 {RATIO} (ref 1–2)
ALP LIVER SERPL-CCNC: 57 U/L
ALT SERPL-CCNC: 24 U/L
ANION GAP SERPL CALC-SCNC: 8 MMOL/L (ref 0–18)
AST SERPL-CCNC: 15 U/L (ref 15–37)
BASOPHILS # BLD AUTO: 0.1 X10(3) UL (ref 0–0.2)
BASOPHILS NFR BLD AUTO: 1.2 %
BILIRUB SERPL-MCNC: 0.5 MG/DL (ref 0.1–2)
BILIRUB UR QL: NEGATIVE
BUN BLD-MCNC: 17 MG/DL (ref 7–18)
BUN/CREAT SERPL: 22.4 (ref 10–20)
CALCIUM BLD-MCNC: 9.7 MG/DL (ref 8.5–10.1)
CHLORIDE SERPL-SCNC: 108 MMOL/L (ref 98–112)
CHOLEST SERPL-MCNC: 219 MG/DL (ref ?–200)
CLARITY UR: CLEAR
CO2 SERPL-SCNC: 26 MMOL/L (ref 21–32)
COLOR UR: YELLOW
CREAT BLD-MCNC: 0.76 MG/DL
DEPRECATED RDW RBC AUTO: 47.2 FL (ref 35.1–46.3)
EOSINOPHIL # BLD AUTO: 0.15 X10(3) UL (ref 0–0.7)
EOSINOPHIL NFR BLD AUTO: 1.7 %
ERYTHROCYTE [DISTWIDTH] IN BLOOD BY AUTOMATED COUNT: 14.7 % (ref 11–15)
FASTING PATIENT LIPID ANSWER: YES
FASTING STATUS PATIENT QL REPORTED: YES
GFR SERPLBLD BASED ON 1.73 SQ M-ARVRAT: 85 ML/MIN/1.73M2 (ref 60–?)
GLOBULIN PLAS-MCNC: 3.2 G/DL (ref 2.8–4.4)
GLUCOSE BLD-MCNC: 99 MG/DL (ref 70–99)
GLUCOSE UR-MCNC: NEGATIVE MG/DL
HCT VFR BLD AUTO: 43.5 %
HDLC SERPL-MCNC: 84 MG/DL (ref 40–59)
HGB BLD-MCNC: 14.6 G/DL
IMM GRANULOCYTES # BLD AUTO: 0.02 X10(3) UL (ref 0–1)
IMM GRANULOCYTES NFR BLD: 0.2 %
KETONES UR-MCNC: NEGATIVE MG/DL
LDLC SERPL CALC-MCNC: 121 MG/DL (ref ?–100)
LEUKOCYTE ESTERASE UR QL STRIP.AUTO: NEGATIVE
LYMPHOCYTES # BLD AUTO: 1.42 X10(3) UL (ref 1–4)
LYMPHOCYTES NFR BLD AUTO: 16.4 %
MCH RBC QN AUTO: 29.3 PG (ref 26–34)
MCHC RBC AUTO-ENTMCNC: 33.6 G/DL (ref 31–37)
MCV RBC AUTO: 87.2 FL
MONOCYTES # BLD AUTO: 1.17 X10(3) UL (ref 0.1–1)
MONOCYTES NFR BLD AUTO: 13.5 %
NEUTROPHILS # BLD AUTO: 5.82 X10 (3) UL (ref 1.5–7.7)
NEUTROPHILS # BLD AUTO: 5.82 X10(3) UL (ref 1.5–7.7)
NEUTROPHILS NFR BLD AUTO: 67 %
NITRITE UR QL STRIP.AUTO: NEGATIVE
NONHDLC SERPL-MCNC: 135 MG/DL (ref ?–130)
OSMOLALITY SERPL CALC.SUM OF ELEC: 296 MOSM/KG (ref 275–295)
PH UR: 6 [PH] (ref 5–8)
PLATELET # BLD AUTO: 370 10(3)UL (ref 150–450)
POTASSIUM SERPL-SCNC: 4.2 MMOL/L (ref 3.5–5.1)
PROT SERPL-MCNC: 7 G/DL (ref 6.4–8.2)
PROT UR-MCNC: NEGATIVE MG/DL
RBC # BLD AUTO: 4.99 X10(6)UL
SODIUM SERPL-SCNC: 142 MMOL/L (ref 136–145)
SP GR UR STRIP: 1.02 (ref 1–1.03)
TRIGL SERPL-MCNC: 78 MG/DL (ref 30–149)
TSI SER-ACNC: 0.99 MIU/ML (ref 0.36–3.74)
URATE SERPL-MCNC: 4.8 MG/DL
UROBILINOGEN UR STRIP-ACNC: <2
VIT C UR-MCNC: NEGATIVE MG/DL
VLDLC SERPL CALC-MCNC: 14 MG/DL (ref 0–30)
WBC # BLD AUTO: 8.7 X10(3) UL (ref 4–11)

## 2022-09-12 PROCEDURE — 84550 ASSAY OF BLOOD/URIC ACID: CPT

## 2022-09-12 PROCEDURE — 80053 COMPREHEN METABOLIC PANEL: CPT

## 2022-09-12 PROCEDURE — 81001 URINALYSIS AUTO W/SCOPE: CPT

## 2022-09-12 PROCEDURE — 80061 LIPID PANEL: CPT

## 2022-09-12 PROCEDURE — 84443 ASSAY THYROID STIM HORMONE: CPT

## 2022-09-12 PROCEDURE — 85025 COMPLETE CBC W/AUTO DIFF WBC: CPT

## 2022-09-12 PROCEDURE — 36415 COLL VENOUS BLD VENIPUNCTURE: CPT

## 2022-09-12 RX ORDER — TRIAMCINOLONE ACETONIDE 40 MG/ML
40 INJECTION, SUSPENSION INTRA-ARTICULAR; INTRAMUSCULAR ONCE
Status: COMPLETED | OUTPATIENT
Start: 2022-09-12 | End: 2022-09-12

## 2022-09-12 RX ORDER — MELOXICAM 7.5 MG/1
7.5 TABLET ORAL DAILY
Qty: 30 TABLET | Refills: 1 | Status: SHIPPED | OUTPATIENT
Start: 2022-09-12

## 2022-09-12 RX ADMIN — TRIAMCINOLONE ACETONIDE 40 MG: 40 INJECTION, SUSPENSION INTRA-ARTICULAR; INTRAMUSCULAR at 17:11:00

## 2022-09-12 NOTE — PROGRESS NOTES
[facility] Follow-up Progress Note      Chief Complaint: Recurrent right knee pain      History: The patient is a 68-year-old female returning due to recurrent pain about the right knee. She was treated with a corticosteroid injection approximately 3 months ago after which she had temporary relief. Plan was to obtain an MRI given continued symptoms. This is yet to be obtained he was unable to tolerate sitting still due to restless leg despite oral sedation. Today she complains current pain primarily anteriorly and occasionally at the lateral aspect. No catching locking or diana instability is reported. She is hopeful for repeat injection. Physical Exam: On examination right knee, slight valgus alignment noted. Range of motion is maintained with near full extension and 120 degrees of flexion. Patellofemoral crepitus persist with minimal pain on grind test.  Lateral joint line is moderately tender to palpation with increased pain on passive hyperflexion and Jensen's laterally. Distal status remains intact. Imaging Results: Previous x-rays from 6/3/2022 personally viewed, independently interpreted and radiology report read. No acute bony abnormality or late degenerative changes noted. Assessment: Diagnoses and all orders for this visit:    Effusion of left knee    Chronic pain of right knee      Plan: I reviewed previous imaging and exam findings with the patient. The patient was scheduled to obtain an MRI but was unable to control her restless leg despite oral sedation. She struggles with anterior lateral pain, stiffness and difficulties after prolonged sitting and sleeping. She requests a repeat aspiration and injection of the right knee in hopes of temporary relief. She will be helping her boss at work who is preparing for maternity leave. She therefore is hopeful for temporary relief.   We touched upon the option for therapeutic and diagnostic arthroscopy if her symptoms persist.  She is unable to consider this in the fall but if corticosteroid injection is ineffective she may consider this later this year. This would be a surgical procedure as an outpatient. I reviewed the typical recovery time including 2 weeks of swelling and possibly working from home in 6 weeks for full recovery. Although typically a presurgical MRI is obtained, I am not sure that deeper sedation or general anesthesia would be worthwhile to obtain the imaging study if she remains painful and swollen. Diagnostic arthroscopy and treatment of any derangement seen may be the more prudent option. For now, she would like to proceed with aspiration and injection. She understands the nature and risks and gives written consent. A follow-up if symptoms persist.    Knee Aspiration Injection Procedure:  After discussing risk, benefits and alternatives to knee aspiration with injection including but not limited to needle infection, steroid flare or failed aspiration/improvement, the patient gave written and verbal consent to proceed. Using meticulous sterile technique I injected 4 cc of 1% Xylocaine at the lateral patellofemoral joint of the right knee for anesthesia. After repeat sterile prep, I used an 18-gauge needle to aspirate 15 to 20 cc of clear straw-colored fluid. This was followed by injection of 40 mg of Kenalog mixed with 4 cc of 1% Xylocaine through the same needle to minimal resistance. Band-Aid was applied followed by application of a compressive 6 inch Ace wrap. Instructions were given to contact us if the patient has any adverse reactions. Patient tolerated the aspiration injection well with no evidence of complications. Rita Franklin MD  [address] [place] [address] Surgery    The dictation was partially prepared using [facility] [employer] voice recognition software.   Although every attempt is made to correct errors where identified, discrepancies may still exist.

## 2022-09-23 ENCOUNTER — IMMUNIZATION (OUTPATIENT)
Dept: LAB | Age: 67
End: 2022-09-23
Attending: EMERGENCY MEDICINE

## 2022-09-23 DIAGNOSIS — Z23 NEED FOR VACCINATION: Primary | ICD-10-CM

## 2022-09-23 PROCEDURE — 0134A SARSCOV2 VAC BVL 50MCG/0.5ML: CPT

## 2022-10-03 RX ORDER — LOSARTAN POTASSIUM 100 MG/1
100 TABLET ORAL DAILY
Qty: 90 TABLET | Refills: 1 | Status: SHIPPED | OUTPATIENT
Start: 2022-10-03

## 2022-10-03 NOTE — TELEPHONE ENCOUNTER
Refill passed per [address] protocol.     .  Requested Prescriptions   Pending Prescriptions Disp Refills    LOSARTAN 100 MG Oral Tab [Pharmacy Med Name: LOSARTAN POTASSIUM 100 MG TAB] 90 tablet 0     Sig: TAKE 1 TABLET BY MOUTH EVERY DAY        Hypertensive Medications Protocol Passed - 10/2/2022  5:57 PM        Passed - In person appointment in the past 12 or next 3 months       Recent Outpatient Visits              3 weeks ago Effusion of left knee    [address] MD    Office Visit    3 weeks ago Medicare annual wellness visit, subsequent    [facility], [facility], Shaun Palumbo MD    Office Visit    2 months ago Effusion of left knee    [address] MD    Office Visit    3 months ago Primary osteoarthritis of right knee    [address] MD    Office Visit    1 year ago Primary hypertension    [facility], [address] [place] [address] [place]    Office Visit     Future Appointments         Provider Department Appt Notes    In 2 weeks [facility] [address] yearly exam               Passed - Last BP reading less than 140/90     BP Readings from Last 1 Encounters:  09/09/22 : 131/69                Passed - CMP or BMP in past 6 months     Recent Results (from the past 4392 hour(s))   COMP METABOLIC PANEL (14)    Collection Time: 09/12/22  9:07 AM   Result Value Ref Range    Glucose 99 70 - 99 mg/dL    Sodium 142 136 - 145 mmol/L    Potassium 4.2 3.5 - 5.1 mmol/L    Chloride 108 98 - 112 mmol/L    CO2 26.0 21.0 - 32.0 mmol/L    Anion Gap 8 0 - 18 mmol/L    BUN 17 7 - 18 mg/dL    Creatinine 0.76 0.55 - 1.02 mg/dL    BUN/CREA Ratio 22.4 (H) 10.0 - 20.0    Calcium, Total 9.7 8.5 - 10.1 mg/dL    Calculated Osmolality 296 (H) 275 - 295 mOsm/kg    eGFR-Cr 85 >=60 mL/min/1.73m2    ALT 24 13 - 56 U/L    AST 15 15 - 37 U/L Alkaline Phosphatase 57 55 - 142 U/L    Bilirubin, Total 0.5 0.1 - 2.0 mg/dL    Total Protein 7.0 6.4 - 8.2 g/dL    Albumin 3.8 3.4 - 5.0 g/dL    Globulin  3.2 2.8 - 4.4 g/dL    A/G Ratio 1.2 1.0 - 2.0    Patient Fasting for CMP? Yes      *Note: Due to a large number of results and/or encounters for the requested time period, some results have not been displayed. A complete set of results can be found in Results Review.                  Passed - In person appointment or virtual visit in the past 6 months       Recent Outpatient Visits              3 weeks ago Effusion of left knee    [address] Shaunna Terry MD    Office Visit    3 weeks ago Medicare annual wellness visit, subsequent    Brittany New MD    Office Visit    2 months ago Effusion of left knee    [address] Shaunna Terry MD    Office Visit    3 months ago Primary osteoarthritis of right knee    [address] Shaunna Terry MD    Office Visit    1 year ago Primary hypertension    [facility], Alaina Snyder MD    Office Visit     Future Appointments         Provider Department Appt Notes    In 2 weeks [facility] [address] yearly exam               Passed [facility] or GFRNAA > 50     GFR Evaluation  EGFRCR: 85 , resulted on 9/12/2022                  Recent Outpatient Visits              3 weeks ago Effusion of left knee    [address] Shaunna Terry MD    Office Visit    3 weeks ago Medicare annual wellness visit, subsequent    Brittany New MD    Office Visit    2 months ago Effusion of left knee    [address] Shaunna Terry MD    Office Visit    3 months ago Primary osteoarthritis of right knee    [address] Melvina Haines MD    Office Visit    1 year ago Primary hypertension    Rodrigo Tapia MD    Office Visit            Future Appointments         Provider Department Appt Notes    In 2 weeks [facility] [address] [address] yearly exam

## 2022-10-21 ENCOUNTER — HOSPITAL ENCOUNTER (OUTPATIENT)
Dept: MAMMOGRAPHY | Age: 67
Discharge: HOME | End: 2022-10-21
Attending: INTERNAL MEDICINE
Payer: MEDICARE

## 2022-10-21 DIAGNOSIS — Z12.31 SCREENING MAMMOGRAM, ENCOUNTER FOR: ICD-10-CM

## 2022-10-21 PROCEDURE — 77063 BREAST TOMOSYNTHESIS BI: CPT | Performed by: INTERNAL MEDICINE

## 2022-10-21 PROCEDURE — 77067 SCR MAMMO BI INCL CAD: CPT | Performed by: INTERNAL MEDICINE

## 2022-11-07 ENCOUNTER — OFFICE VISIT (OUTPATIENT)
Dept: ORTHOPEDICS CLINIC | Facility: CLINIC | Age: 67
End: 2022-11-07
Payer: MEDICARE

## 2022-11-07 ENCOUNTER — TELEPHONE (OUTPATIENT)
Dept: ORTHOPEDICS CLINIC | Facility: CLINIC | Age: 67
End: 2022-11-07

## 2022-11-07 VITALS — WEIGHT: 201 LBS | BODY MASS INDEX: 35.61 KG/M2 | HEIGHT: 63 IN

## 2022-11-07 DIAGNOSIS — M25.462 EFFUSION OF LEFT KNEE: Primary | ICD-10-CM

## 2022-11-07 DIAGNOSIS — M17.12 PRIMARY OSTEOARTHRITIS OF LEFT KNEE: ICD-10-CM

## 2022-11-07 PROCEDURE — 1125F AMNT PAIN NOTED PAIN PRSNT: CPT | Performed by: ORTHOPAEDIC SURGERY

## 2022-11-07 PROCEDURE — 99214 OFFICE O/P EST MOD 30 MIN: CPT | Performed by: ORTHOPAEDIC SURGERY

## 2022-11-07 PROCEDURE — 3008F BODY MASS INDEX DOCD: CPT | Performed by: ORTHOPAEDIC SURGERY

## 2022-11-07 NOTE — PROGRESS NOTES
[facility] Follow-up Progress Note      Chief Complaint: Chronic right knee pain      History: Shayy Gonzalez is a 76-year-old female returning due to chronic and persistent pain about the right knee. Aspiration and injection was provided at her last visit with only temporary relief. At this point she is struggling with anterior and lateral knee pain without diana instability, locking, redness or warmth. Given failed conservative care, she is amenable to considering arthroscopic treatment options. She has failed an attempt to obtain an MRI despite oral sedation. Physical Exam: On examination she is walking with antalgia protecting the right knee. Slight valgus alignment is redemonstrated. Range of motion is maintained 0 to 120 degrees. Patellofemoral crepitus is persistent with diminished patellofemoral mobility but a negative grind test.  Lateral joint line is tender to palpation more so than medial.  Varus valgus stress reveals no laxity but pain on varus is localized laterally. Hyperflexion and Jensen's is painful laterally. Distal exam reveals no calf tenderness, foot and ankle edema or neurovascular compromise. Imaging Results: Plan x-rays dated 6/3/2022 includes a weightbearing AP demonstrated no late degenerative changes but slight valgus alignment. No acute bony abnormalities noted. Assessment: Diagnoses and all orders for this visit:    Effusion of left knee    Primary osteoarthritis of left knee        Plan: I reviewed history imaging and exam findings with the patient. She is failing conservative care despite multiple cortisone injections. We attempted to obtain an MRI unsuccessfully despite oral sedation. The alternative would be for general anesthesia. Given the minimally invasive nature of knee arthroscopy the patient would prefer to definitively treat the knee with surgical arthroscopy under general anesthesia rather than undergo anesthesia for an MRI.   This will not only be diagnostic but also afforded the opportunity for therapeutic treatment of the knee including chondroplasty, debridement and possible partial meniscectomies if indicated. I do not suspect any ligamentous pathology requiring surgical treatment. We discussed the nature of the procedure and the typical postoperative course. Risk, benefits and alternatives to surgery were discussed including but not limited to possible infection, bleeding, neurovascular injury as well as postop stiffness, continued pain and failed improvement following surgery. Risks of anesthesia were also reviewed including but not limited to possible cardiac, pulmonary, or cerebrovascular complications, any of which could be life-threatening. Given the patients will health history, the risk of the serious complications is felt to be low but not zero. Pre-operative medical clearance for anesthesia will be required. The proposed operation is a right knee arthroscopy and debridement with chondroplasty, possible partial meniscectomies. The patient verbalized understanding and agreement and was advised to contact our office for surgical scheduling if desired. Montse Dia MD, [place] [address] Medicine/Knee and Shoulder  [facility] Department of Orthopaedics  Phone [phone]  Fax [phone]  Email:  [email or web address]        This document was partially prepared using [employer] [place] [employer] voice recognition software.   Although every attempt is made to correct errors during dictation, discrepancies may still exist.

## 2022-11-08 ENCOUNTER — TELEPHONE (OUTPATIENT)
Dept: ORTHOPEDICS CLINIC | Facility: CLINIC | Age: 67
End: 2022-11-08

## 2022-11-08 DIAGNOSIS — M25.561 CHRONIC PAIN OF RIGHT KNEE: ICD-10-CM

## 2022-11-08 DIAGNOSIS — M17.11 PRIMARY OSTEOARTHRITIS OF RIGHT KNEE: Primary | ICD-10-CM

## 2022-11-08 DIAGNOSIS — G89.29 CHRONIC PAIN OF RIGHT KNEE: ICD-10-CM

## 2022-11-08 NOTE — TELEPHONE ENCOUNTER
SURGERY SCHEDULING SHEET    Julio César Morelos  6/11/1955  [id number]    Procedure: Right Knee Arthroscopy, Chondroplasty (44365) and Other: possible partial medial and lateral meniscectomy    Diagnosis:  Right knee degenerative joint disease M17.11 and chronic right knee pain M25.561    Anesthesia: General    Length of Surgery: 1 hr    Disposition: Outpatient    Special Equipment: NONE    Assist: Yes Jeff Hi PA-C    Pre-op Testing: PER ANESTHESIA GUIDELINES    Clearance: HISTORY AND PHYSICAL     Post op: 7-10 days post op    Laurie Rose MD  [address] Orthopedic Surgery  Phone: [phone]  Fax: [phone]

## 2022-11-09 DIAGNOSIS — M17.11 PRIMARY OSTEOARTHRITIS OF RIGHT KNEE: ICD-10-CM

## 2022-11-09 DIAGNOSIS — G89.29 CHRONIC PAIN OF RIGHT KNEE: ICD-10-CM

## 2022-11-09 DIAGNOSIS — M25.462 EFFUSION OF LEFT KNEE: ICD-10-CM

## 2022-11-09 DIAGNOSIS — M25.561 CHRONIC PAIN OF RIGHT KNEE: ICD-10-CM

## 2022-11-09 RX ORDER — MELOXICAM 7.5 MG/1
TABLET ORAL
Qty: 30 TABLET | Refills: 1 | Status: SHIPPED | OUTPATIENT
Start: 2022-11-09

## 2022-11-17 ENCOUNTER — TELEPHONE (OUTPATIENT)
Dept: ORTHOPEDICS CLINIC | Facility: CLINIC | Age: 67
End: 2022-11-17

## 2022-11-17 NOTE — TELEPHONE ENCOUNTER
Called and spoke with Jermaine Kramer to inform her that we would not need to reschedule her surgery due to this. I also let her know that it just puts a rush on getting her cleared for surgery. I also informed her that if she is in need of any testing by her PCP it take a little longer to receive her clearance. She states that she did have a physical done by her PCP about 2 months ago and nothing has changed in her medical history. I did let her know that she would still need to she them for clearance. She stated understanding and all questions and concerns have been addressed and answered.

## 2022-11-17 NOTE — TELEPHONE ENCOUNTER
Patient called regarding upcoming SX. Patient states her 11/30 appt with PCP is less than 2 weeks before her 12/6 SX with Dr. David Mejia. Patient would like to know if her surgery has to be cancelled because of this. Please advise, thank you.    Patient can be reached at: [phone]

## 2022-11-18 ENCOUNTER — TELEPHONE (OUTPATIENT)
Dept: INTERNAL MEDICINE CLINIC | Facility: CLINIC | Age: 67
End: 2022-11-18

## 2022-11-18 DIAGNOSIS — I10 ESSENTIAL HYPERTENSION: ICD-10-CM

## 2022-11-18 DIAGNOSIS — Z01.810 PREOP CARDIOVASCULAR EXAM: Primary | ICD-10-CM

## 2022-11-18 NOTE — TELEPHONE ENCOUNTER
Patient would like to know if she could do her pre op lab work, EKG before her clearance appointment on 11/30.

## 2022-11-20 NOTE — TELEPHONE ENCOUNTER
Labs and ekg ordered   Pt might have more labs ordered from Surgeon   Need preop form wih requirements for preop testing  .

## 2022-11-22 ENCOUNTER — LAB ENCOUNTER (OUTPATIENT)
Dept: LAB | Age: 67
End: 2022-11-22
Attending: INTERNAL MEDICINE
Payer: MEDICARE

## 2022-11-22 DIAGNOSIS — I10 ESSENTIAL HYPERTENSION: ICD-10-CM

## 2022-11-22 DIAGNOSIS — Z01.810 PREOP CARDIOVASCULAR EXAM: ICD-10-CM

## 2022-11-22 LAB
ALBUMIN SERPL-MCNC: 3.5 G/DL (ref 3.4–5)
ALBUMIN/GLOB SERPL: 1.1 {RATIO} (ref 1–2)
ALP LIVER SERPL-CCNC: 59 U/L
ALT SERPL-CCNC: 21 U/L
ANION GAP SERPL CALC-SCNC: 3 MMOL/L (ref 0–18)
AST SERPL-CCNC: 12 U/L (ref 15–37)
BASOPHILS # BLD AUTO: 0.09 X10(3) UL (ref 0–0.2)
BASOPHILS NFR BLD AUTO: 1.1 %
BILIRUB SERPL-MCNC: 0.4 MG/DL (ref 0.1–2)
BUN BLD-MCNC: 16 MG/DL (ref 7–18)
BUN/CREAT SERPL: 20.5 (ref 10–20)
CALCIUM BLD-MCNC: 9.6 MG/DL (ref 8.5–10.1)
CHLORIDE SERPL-SCNC: 107 MMOL/L (ref 98–112)
CO2 SERPL-SCNC: 30 MMOL/L (ref 21–32)
CREAT BLD-MCNC: 0.78 MG/DL
DEPRECATED RDW RBC AUTO: 47.8 FL (ref 35.1–46.3)
EOSINOPHIL # BLD AUTO: 0.29 X10(3) UL (ref 0–0.7)
EOSINOPHIL NFR BLD AUTO: 3.6 %
ERYTHROCYTE [DISTWIDTH] IN BLOOD BY AUTOMATED COUNT: 14.5 % (ref 11–15)
FASTING STATUS PATIENT QL REPORTED: YES
GFR SERPLBLD BASED ON 1.73 SQ M-ARVRAT: 82 ML/MIN/1.73M2 (ref 60–?)
GLOBULIN PLAS-MCNC: 3.3 G/DL (ref 2.8–4.4)
GLUCOSE BLD-MCNC: 95 MG/DL (ref 70–99)
HCT VFR BLD AUTO: 42.6 %
HGB BLD-MCNC: 13.9 G/DL
IMM GRANULOCYTES # BLD AUTO: 0.02 X10(3) UL (ref 0–1)
IMM GRANULOCYTES NFR BLD: 0.2 %
LYMPHOCYTES # BLD AUTO: 1.35 X10(3) UL (ref 1–4)
LYMPHOCYTES NFR BLD AUTO: 16.6 %
MCH RBC QN AUTO: 29.1 PG (ref 26–34)
MCHC RBC AUTO-ENTMCNC: 32.6 G/DL (ref 31–37)
MCV RBC AUTO: 89.1 FL
MONOCYTES # BLD AUTO: 1.19 X10(3) UL (ref 0.1–1)
MONOCYTES NFR BLD AUTO: 14.7 %
NEUTROPHILS # BLD AUTO: 5.17 X10 (3) UL (ref 1.5–7.7)
NEUTROPHILS # BLD AUTO: 5.17 X10(3) UL (ref 1.5–7.7)
NEUTROPHILS NFR BLD AUTO: 63.8 %
OSMOLALITY SERPL CALC.SUM OF ELEC: 291 MOSM/KG (ref 275–295)
PLATELET # BLD AUTO: 355 10(3)UL (ref 150–450)
POTASSIUM SERPL-SCNC: 3.8 MMOL/L (ref 3.5–5.1)
PROT SERPL-MCNC: 6.8 G/DL (ref 6.4–8.2)
RBC # BLD AUTO: 4.78 X10(6)UL
SODIUM SERPL-SCNC: 140 MMOL/L (ref 136–145)
WBC # BLD AUTO: 8.1 X10(3) UL (ref 4–11)

## 2022-11-22 PROCEDURE — 85025 COMPLETE CBC W/AUTO DIFF WBC: CPT

## 2022-11-22 PROCEDURE — 93010 ELECTROCARDIOGRAM REPORT: CPT | Performed by: INTERNAL MEDICINE

## 2022-11-22 PROCEDURE — 93005 ELECTROCARDIOGRAM TRACING: CPT

## 2022-11-22 PROCEDURE — 36415 COLL VENOUS BLD VENIPUNCTURE: CPT

## 2022-11-22 PROCEDURE — 80053 COMPREHEN METABOLIC PANEL: CPT

## 2022-11-23 LAB
ATRIAL RATE: 68 BPM
P AXIS: 57 DEGREES
P-R INTERVAL: 146 MS
Q-T INTERVAL: 384 MS
QRS DURATION: 88 MS
QTC CALCULATION (BEZET): 408 MS
R AXIS: 3 DEGREES
T AXIS: 33 DEGREES
VENTRICULAR RATE: 68 BPM

## 2022-11-30 ENCOUNTER — OFFICE VISIT (OUTPATIENT)
Dept: INTERNAL MEDICINE CLINIC | Facility: CLINIC | Age: 67
End: 2022-11-30
Payer: MEDICARE

## 2022-11-30 DIAGNOSIS — M25.561 RECURRENT PAIN OF RIGHT KNEE: ICD-10-CM

## 2022-11-30 DIAGNOSIS — Z01.810 PREOP CARDIOVASCULAR EXAM: Primary | ICD-10-CM

## 2022-11-30 DIAGNOSIS — I10 ESSENTIAL HYPERTENSION: ICD-10-CM

## 2022-11-30 PROCEDURE — 3075F SYST BP GE 130 - 139MM HG: CPT | Performed by: INTERNAL MEDICINE

## 2022-11-30 PROCEDURE — 3008F BODY MASS INDEX DOCD: CPT | Performed by: INTERNAL MEDICINE

## 2022-11-30 PROCEDURE — 1125F AMNT PAIN NOTED PAIN PRSNT: CPT | Performed by: INTERNAL MEDICINE

## 2022-11-30 PROCEDURE — 99215 OFFICE O/P EST HI 40 MIN: CPT | Performed by: INTERNAL MEDICINE

## 2022-11-30 PROCEDURE — 3078F DIAST BP <80 MM HG: CPT | Performed by: INTERNAL MEDICINE

## 2022-12-02 ENCOUNTER — TELEPHONE (OUTPATIENT)
Dept: INTERNAL MEDICINE CLINIC | Facility: CLINIC | Age: 67
End: 2022-12-02

## 2022-12-02 ENCOUNTER — TELEPHONE (OUTPATIENT)
Dept: ORTHOPEDICS CLINIC | Facility: CLINIC | Age: 67
End: 2022-12-02

## 2022-12-02 VITALS
WEIGHT: 197 LBS | SYSTOLIC BLOOD PRESSURE: 137 MMHG | DIASTOLIC BLOOD PRESSURE: 74 MMHG | BODY MASS INDEX: 34.91 KG/M2 | HEIGHT: 63 IN | OXYGEN SATURATION: 97 % | HEART RATE: 69 BPM

## 2022-12-02 NOTE — TELEPHONE ENCOUNTER
Yunior Bell nurse from Dr. Montgomery Oh called patient was cleared for surgery based off EKG and labs. Nabil Perez is contacting infectious disease to see if patient needs vaccines  before surgery.  If patient can't get vaccines prior to surgery they will have to reschedule surgery until patient has completed vaccinations     DOS 12/6/2022 PCP

## 2022-12-02 NOTE — TELEPHONE ENCOUNTER
Willam Atwood called again and stated patient is ok to have surgery, states vaccines will be given after patient recovers from surgery. Clearance has been faxed over.

## 2022-12-02 NOTE — TELEPHONE ENCOUNTER
Fax confirmation rec'd. Dr. Steven Abarca informed me that pt was clear for surgery on the standpoint of ekg, and labs but may need to have vaccines prior to surgery that may postpone it. Called Dr. Gaby Feldman office and informed them of situation. Dr. Steven Abarca informed me that she contacted infectious disease and patient is cleared for surgery on 12/6/22. Hib and meningitis vaccine will be given in our office once patient is recovered from her surgery. Called Dr. Gaby Feldman office again and informed them that patient is now clear for surgery on 12/6/22, and vaccines will be given after surgery in our office when pt recovers from surgery. Phone room verbalized understanding and will relay message to Dr. Hector Hernandez. MD has contacted pt and informed her of information as well.

## 2022-12-03 ENCOUNTER — LAB ENCOUNTER (OUTPATIENT)
Dept: LAB | Age: 67
End: 2022-12-03
Attending: ORTHOPAEDIC SURGERY
Payer: MEDICARE

## 2022-12-03 DIAGNOSIS — Z20.822 ENCOUNTER FOR PREOPERATIVE SCREENING LABORATORY TESTING FOR COVID-19 VIRUS: ICD-10-CM

## 2022-12-03 DIAGNOSIS — Z01.812 ENCOUNTER FOR PREOPERATIVE SCREENING LABORATORY TESTING FOR COVID-19 VIRUS: ICD-10-CM

## 2022-12-04 LAB — SARS-COV-2 RNA RESP QL NAA+PROBE: NOT DETECTED

## 2022-12-06 ENCOUNTER — ANESTHESIA (OUTPATIENT)
Dept: SURGERY | Facility: HOSPITAL | Age: 67
End: 2022-12-06
Payer: MEDICARE

## 2022-12-06 ENCOUNTER — HOSPITAL ENCOUNTER (OUTPATIENT)
Facility: HOSPITAL | Age: 67
Setting detail: HOSPITAL OUTPATIENT SURGERY
Discharge: HOME | End: 2022-12-06
Attending: ORTHOPAEDIC SURGERY | Admitting: ORTHOPAEDIC SURGERY
Payer: MEDICARE

## 2022-12-06 ENCOUNTER — ANESTHESIA EVENT (OUTPATIENT)
Dept: SURGERY | Facility: HOSPITAL | Age: 67
End: 2022-12-06
Payer: MEDICARE

## 2022-12-06 VITALS
DIASTOLIC BLOOD PRESSURE: 89 MMHG | SYSTOLIC BLOOD PRESSURE: 164 MMHG | HEART RATE: 85 BPM | TEMPERATURE: 98 F | WEIGHT: 196.19 LBS | HEIGHT: 63 IN | RESPIRATION RATE: 13 BRPM | OXYGEN SATURATION: 95 % | BODY MASS INDEX: 34.76 KG/M2

## 2022-12-06 DIAGNOSIS — Z20.822 ENCOUNTER FOR PREOPERATIVE SCREENING LABORATORY TESTING FOR COVID-19 VIRUS: Primary | ICD-10-CM

## 2022-12-06 DIAGNOSIS — Z01.812 ENCOUNTER FOR PREOPERATIVE SCREENING LABORATORY TESTING FOR COVID-19 VIRUS: Primary | ICD-10-CM

## 2022-12-06 PROCEDURE — 0SBC4ZZ EXCISION OF RIGHT KNEE JOINT, PERCUTANEOUS ENDOSCOPIC APPROACH: ICD-10-PCS | Performed by: ORTHOPAEDIC SURGERY

## 2022-12-06 RX ORDER — MORPHINE SULFATE 2 MG/ML
INJECTION, SOLUTION INTRAMUSCULAR; INTRAVENOUS AS NEEDED
Status: DISCONTINUED | OUTPATIENT
Start: 2022-12-06 | End: 2022-12-06 | Stop reason: HOSPADM

## 2022-12-06 RX ORDER — HYDROCODONE BITARTRATE AND ACETAMINOPHEN 5; 325 MG/1; MG/1
2 TABLET ORAL ONCE AS NEEDED
Status: COMPLETED | OUTPATIENT
Start: 2022-12-06 | End: 2022-12-06

## 2022-12-06 RX ORDER — SODIUM CHLORIDE, SODIUM LACTATE, POTASSIUM CHLORIDE, CALCIUM CHLORIDE 600; 310; 30; 20 MG/100ML; MG/100ML; MG/100ML; MG/100ML
INJECTION, SOLUTION INTRAVENOUS CONTINUOUS
Status: DISCONTINUED | OUTPATIENT
Start: 2022-12-06 | End: 2022-12-06

## 2022-12-06 RX ORDER — LIDOCAINE HYDROCHLORIDE 10 MG/ML
INJECTION, SOLUTION INFILTRATION; PERINEURAL AS NEEDED
Status: DISCONTINUED | OUTPATIENT
Start: 2022-12-06 | End: 2022-12-06 | Stop reason: HOSPADM

## 2022-12-06 RX ORDER — LIDOCAINE HYDROCHLORIDE 10 MG/ML
INJECTION, SOLUTION EPIDURAL; INFILTRATION; INTRACAUDAL; PERINEURAL AS NEEDED
Status: DISCONTINUED | OUTPATIENT
Start: 2022-12-06 | End: 2022-12-06 | Stop reason: SURG

## 2022-12-06 RX ORDER — METOCLOPRAMIDE HYDROCHLORIDE 5 MG/ML
INJECTION INTRAMUSCULAR; INTRAVENOUS
Status: COMPLETED
Start: 2022-12-06 | End: 2022-12-06

## 2022-12-06 RX ORDER — HYDROMORPHONE HYDROCHLORIDE 1 MG/ML
0.2 INJECTION, SOLUTION INTRAMUSCULAR; INTRAVENOUS; SUBCUTANEOUS EVERY 5 MIN PRN
Status: DISCONTINUED | OUTPATIENT
Start: 2022-12-06 | End: 2022-12-06

## 2022-12-06 RX ORDER — LABETALOL HYDROCHLORIDE 5 MG/ML
INJECTION, SOLUTION INTRAVENOUS AS NEEDED
Status: DISCONTINUED | OUTPATIENT
Start: 2022-12-06 | End: 2022-12-06 | Stop reason: SURG

## 2022-12-06 RX ORDER — HYDROCODONE BITARTRATE AND ACETAMINOPHEN 5; 325 MG/1; MG/1
1 TABLET ORAL ONCE AS NEEDED
Status: DISCONTINUED | OUTPATIENT
Start: 2022-12-06 | End: 2022-12-06

## 2022-12-06 RX ORDER — MIDAZOLAM HYDROCHLORIDE 1 MG/ML
1 INJECTION INTRAMUSCULAR; INTRAVENOUS EVERY 5 MIN PRN
Status: DISCONTINUED | OUTPATIENT
Start: 2022-12-06 | End: 2022-12-06

## 2022-12-06 RX ORDER — HYDROMORPHONE HYDROCHLORIDE 1 MG/ML
0.6 INJECTION, SOLUTION INTRAMUSCULAR; INTRAVENOUS; SUBCUTANEOUS EVERY 5 MIN PRN
Status: DISCONTINUED | OUTPATIENT
Start: 2022-12-06 | End: 2022-12-06

## 2022-12-06 RX ORDER — HYDROCODONE BITARTRATE AND ACETAMINOPHEN 5; 325 MG/1; MG/1
2 TABLET ORAL ONCE AS NEEDED
Status: DISCONTINUED | OUTPATIENT
Start: 2022-12-06 | End: 2022-12-06

## 2022-12-06 RX ORDER — ONDANSETRON 2 MG/ML
INJECTION INTRAMUSCULAR; INTRAVENOUS AS NEEDED
Status: DISCONTINUED | OUTPATIENT
Start: 2022-12-06 | End: 2022-12-06 | Stop reason: SURG

## 2022-12-06 RX ORDER — ACETAMINOPHEN 500 MG
1000 TABLET ORAL ONCE AS NEEDED
Status: DISCONTINUED | OUTPATIENT
Start: 2022-12-06 | End: 2022-12-06

## 2022-12-06 RX ORDER — MIDAZOLAM HYDROCHLORIDE 1 MG/ML
INJECTION INTRAMUSCULAR; INTRAVENOUS AS NEEDED
Status: DISCONTINUED | OUTPATIENT
Start: 2022-12-06 | End: 2022-12-06 | Stop reason: SURG

## 2022-12-06 RX ORDER — HYDROCODONE BITARTRATE AND ACETAMINOPHEN 5; 325 MG/1; MG/1
1 TABLET ORAL ONCE AS NEEDED
Status: COMPLETED | OUTPATIENT
Start: 2022-12-06 | End: 2022-12-06

## 2022-12-06 RX ORDER — ACETAMINOPHEN 500 MG
1000 TABLET ORAL ONCE
Status: DISCONTINUED | OUTPATIENT
Start: 2022-12-06 | End: 2022-12-06 | Stop reason: HOSPADM

## 2022-12-06 RX ORDER — BUPIVACAINE HYDROCHLORIDE AND EPINEPHRINE 2.5; 5 MG/ML; UG/ML
INJECTION, SOLUTION EPIDURAL; INFILTRATION; INTRACAUDAL; PERINEURAL AS NEEDED
Status: DISCONTINUED | OUTPATIENT
Start: 2022-12-06 | End: 2022-12-06 | Stop reason: HOSPADM

## 2022-12-06 RX ORDER — POVIDONE-IODINE 10 MG/G
OINTMENT TOPICAL AS NEEDED
Status: DISCONTINUED | OUTPATIENT
Start: 2022-12-06 | End: 2022-12-06 | Stop reason: HOSPADM

## 2022-12-06 RX ORDER — ACETAMINOPHEN 500 MG
1000 TABLET ORAL ONCE AS NEEDED
Status: COMPLETED | OUTPATIENT
Start: 2022-12-06 | End: 2022-12-06

## 2022-12-06 RX ORDER — CEFAZOLIN SODIUM/WATER 2 G/20 ML
2 SYRINGE (ML) INTRAVENOUS ONCE
Status: COMPLETED | OUTPATIENT
Start: 2022-12-06 | End: 2022-12-06

## 2022-12-06 RX ORDER — NALOXONE HYDROCHLORIDE 0.4 MG/ML
80 INJECTION, SOLUTION INTRAMUSCULAR; INTRAVENOUS; SUBCUTANEOUS AS NEEDED
Status: DISCONTINUED | OUTPATIENT
Start: 2022-12-06 | End: 2022-12-06

## 2022-12-06 RX ORDER — ACETAMINOPHEN AND CODEINE PHOSPHATE 300; 30 MG/1; MG/1
1-2 TABLET ORAL EVERY 6 HOURS PRN
Qty: 20 TABLET | Refills: 0 | Status: SHIPPED | OUTPATIENT
Start: 2022-12-06

## 2022-12-06 RX ORDER — HYDRALAZINE HYDROCHLORIDE 20 MG/ML
INJECTION INTRAMUSCULAR; INTRAVENOUS
Status: COMPLETED
Start: 2022-12-06 | End: 2022-12-06

## 2022-12-06 RX ORDER — MEPERIDINE HYDROCHLORIDE 25 MG/ML
12.5 INJECTION INTRAMUSCULAR; INTRAVENOUS; SUBCUTANEOUS ONCE
Status: COMPLETED | OUTPATIENT
Start: 2022-12-06 | End: 2022-12-06

## 2022-12-06 RX ORDER — DEXAMETHASONE SODIUM PHOSPHATE 4 MG/ML
VIAL (ML) INJECTION AS NEEDED
Status: DISCONTINUED | OUTPATIENT
Start: 2022-12-06 | End: 2022-12-06 | Stop reason: SURG

## 2022-12-06 RX ORDER — HYDRALAZINE HYDROCHLORIDE 20 MG/ML
5 INJECTION INTRAMUSCULAR; INTRAVENOUS ONCE
Status: COMPLETED | OUTPATIENT
Start: 2022-12-06 | End: 2022-12-06

## 2022-12-06 RX ORDER — METOCLOPRAMIDE HYDROCHLORIDE 5 MG/ML
10 INJECTION INTRAMUSCULAR; INTRAVENOUS EVERY 8 HOURS PRN
Status: DISCONTINUED | OUTPATIENT
Start: 2022-12-06 | End: 2022-12-06

## 2022-12-06 RX ORDER — ONDANSETRON 2 MG/ML
4 INJECTION INTRAMUSCULAR; INTRAVENOUS EVERY 6 HOURS PRN
Status: DISCONTINUED | OUTPATIENT
Start: 2022-12-06 | End: 2022-12-06

## 2022-12-06 RX ORDER — HYDROMORPHONE HYDROCHLORIDE 1 MG/ML
0.4 INJECTION, SOLUTION INTRAMUSCULAR; INTRAVENOUS; SUBCUTANEOUS EVERY 5 MIN PRN
Status: DISCONTINUED | OUTPATIENT
Start: 2022-12-06 | End: 2022-12-06

## 2022-12-06 RX ADMIN — LABETALOL HYDROCHLORIDE 5 MG: 5 INJECTION, SOLUTION INTRAVENOUS at 15:35:00

## 2022-12-06 RX ADMIN — DEXAMETHASONE SODIUM PHOSPHATE 4 MG: 4 MG/ML VIAL (ML) INJECTION at 15:17:00

## 2022-12-06 RX ADMIN — CEFAZOLIN SODIUM/WATER 2 G: 2 G/20 ML SYRINGE (ML) INTRAVENOUS at 15:07:00

## 2022-12-06 RX ADMIN — LIDOCAINE HYDROCHLORIDE 100 MG: 10 INJECTION, SOLUTION EPIDURAL; INFILTRATION; INTRACAUDAL; PERINEURAL at 15:04:00

## 2022-12-06 RX ADMIN — ONDANSETRON 4 MG: 2 INJECTION INTRAMUSCULAR; INTRAVENOUS at 15:17:00

## 2022-12-06 RX ADMIN — MIDAZOLAM HYDROCHLORIDE 2 MG: 1 INJECTION INTRAMUSCULAR; INTRAVENOUS at 15:01:00

## 2022-12-06 RX ADMIN — SODIUM CHLORIDE, SODIUM LACTATE, POTASSIUM CHLORIDE, CALCIUM CHLORIDE: 600; 310; 30; 20 INJECTION, SOLUTION INTRAVENOUS at 15:58:00

## 2022-12-06 RX ADMIN — LABETALOL HYDROCHLORIDE 10 MG: 5 INJECTION, SOLUTION INTRAVENOUS at 15:51:00

## 2022-12-06 NOTE — BRIEF OP NOTE
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

## 2022-12-06 NOTE — ANESTHESIA PROCEDURE NOTES
Airway  Date/Time: 12/6/2022 3:05 PM  Urgency: elective      General Information and Staff    Patient location during procedure: OR  Anesthesiologist: Yehuda Mabry MD  Performed: anesthesiologist     Indications and Patient Condition  Indications for airway management: anesthesia  Sedation level: deep  Preoxygenated: yes  Patient position: sniffing  Mask difficulty assessment: 0 - not attempted    Final Airway Details  Final airway type: supraglottic airway      Successful airway: classic  Size 3       Number of attempts at approach: 1

## 2022-12-06 NOTE — DISCHARGE INSTRUCTIONS
Post-operative Arthroscopy    Medication: Before you leave the hospital, you will be given a prescription for a pain reliever. This medication contains a narcotic with acetaminophen (Tylenol), so do not take any additional Tylenol. You may take Tylenol or Ibuprofen instead of the prescription as the pain subsides. If you take a daily Aspirin you may resume taking your Aspirin the evening of surgery. Day of Surgery: When you return home, elevate the extremity on some pillows, if applicable. For shoulder arthroscopy, sleeping upright (such as in a recliner) may be more comfortable for the first few days. Place an ice bag over the dressing for about 20 minutes three or four times a day for the first 5 days while protecting the skin with a sterile dressing plus Ace wrap (for knee) or towel (for shoulder). Dressings: The dressing should remain in place for 48 hours. After 48 hours, remove the bulky Ace wrap and the gauze dressings. Apply a small amount of Betadine ointment to sutures and cover with a breathable band-aid. Showering: Before showering, apply waterproof band-aids to sutures to ensure these areas do not get wet. Once finished, remove band-aids, let area air dry, and apply another small amount of Betadine ointment and regular band-aids. Do not immerse or soak the surgical wound (no baths). You may continue to use the Ace bandage if it makes you more comfortable. Activity: You may walk on the leg as tolerated, unless instructed differently. You may use crutches or a cane as needed to minimize discomfort. For knee arthroscopy: Flex and extend the knee, foot, and ankle to full range of motion as soon as possible to prevent stiffness. For shoulder arthroscopy: Flex and extend the hand, wrist, and elbow and begin pendulums as instructed for underarm hygiene and dressing. Follow up: You will be scheduled for a follow up office visit in 7-10 days to have your sutures taken out.  Your plan for physical therapy, driving, and returning to work will be discussed at this appointment. Bernarda Carmona was given to you at: 6:15 pm  Next dose Tylenol #3: 12:15 am  Take this medication as directed  This medication contains Tylenol  Do not take addition Tylenol while taking Norco    This medication is a narcotic and can be constipating or upset your stomach  No driving or drinking alcohol while taking   Please don't hesitate to contact our office at [phone] if you have any post-operative questions or concerns.

## 2022-12-07 ENCOUNTER — TELEPHONE (OUTPATIENT)
Dept: ORTHOPEDICS CLINIC | Facility: CLINIC | Age: 67
End: 2022-12-07

## 2022-12-07 NOTE — TELEPHONE ENCOUNTER
Called patient to follow up with her post op surgery. Patient had no questions or concerns at this time.

## 2022-12-12 ENCOUNTER — OFFICE VISIT (OUTPATIENT)
Dept: ORTHOPEDICS CLINIC | Facility: CLINIC | Age: 67
End: 2022-12-12
Payer: MEDICARE

## 2022-12-12 VITALS — WEIGHT: 196 LBS | BODY MASS INDEX: 34.73 KG/M2 | HEIGHT: 63 IN

## 2022-12-12 DIAGNOSIS — Z48.89 AFTERCARE FOLLOWING SURGERY: Primary | ICD-10-CM

## 2022-12-12 PROCEDURE — 99024 POSTOP FOLLOW-UP VISIT: CPT | Performed by: ORTHOPAEDIC SURGERY

## 2022-12-12 PROCEDURE — 3008F BODY MASS INDEX DOCD: CPT | Performed by: ORTHOPAEDIC SURGERY

## 2022-12-12 PROCEDURE — 1125F AMNT PAIN NOTED PAIN PRSNT: CPT | Performed by: ORTHOPAEDIC SURGERY

## 2022-12-12 NOTE — PROGRESS NOTES
[facility] [place] [facility] Post-op Progress Note      Date of Surgery: 12/6/2022      History: The patient is a 69year old female presenting for postoperative follow-up approximately 1 week status post arthroscopy of the right knee. The patient reports no immediate complications from anesthesia or surgery. The patient denies any wound issues, constitutional symptoms, or increasing symptoms of pain. The patient is ambulating without assistive devices at this point. Physical Exam: On examination, portal incisions are healed and sutures are removed today. Mild postoperative effusion is palpable without warmth or visible erythema. Adequate extensor mechanism control is noted with near full extension and greater than 90 degrees of flexion. Calves are nontender and Joselito's test is negative. Assessment:  Status post right knee arthroscopy with partial lateral meniscectomy, excision of lateral meniscal cyst and chondroplasty doing well    Plan: I reviewed intraoperative and physical exam findings with the patient. Postoperative course is progressing as expected without complications. The patient was advised to continue with a home stretching and light strengthening routine. Submersion of the wounds should be avoided for 2-3 additional weeks or until the wounds are maturely healed and free of eschar. Physical therapy was offered versus a home exercise program.  Follow-up is recommended in 4 weeks for re-evaluation. The patient was advised to follow-up sooner if there are any new symptoms or concerns. Montse Becker MD, [place] [id number] Medicine/Knee and Shoulder  [facility] Department of Orthopaedics  Phone [phone]  Fax [phone]  Email:  [email or web address]        This document was partially prepared using [employer] voice recognition software.   Although every attempt is made to correct errors during dictation, discrepancies may still exist.

## 2023-01-12 ENCOUNTER — TELEPHONE (OUTPATIENT)
Facility: CLINIC | Age: 68
End: 2023-01-12

## 2023-01-12 NOTE — TELEPHONE ENCOUNTER
----- Message from Michael Villa Loup Cecily sent at 3/14/2018 11:27 AM CDT -----  Regarding: Recall colon   Recall colon in 5 years per PL.  Colon done 3-12-18

## 2023-01-16 ENCOUNTER — OFFICE VISIT (OUTPATIENT)
Dept: ORTHOPEDICS CLINIC | Facility: CLINIC | Age: 68
End: 2023-01-16
Payer: MEDICARE

## 2023-01-16 VITALS — WEIGHT: 196 LBS | HEIGHT: 63 IN | BODY MASS INDEX: 34.73 KG/M2

## 2023-01-16 DIAGNOSIS — M17.11 PRIMARY OSTEOARTHRITIS OF RIGHT KNEE: ICD-10-CM

## 2023-01-16 DIAGNOSIS — M25.561 CHRONIC PAIN OF RIGHT KNEE: ICD-10-CM

## 2023-01-16 DIAGNOSIS — M25.462 EFFUSION OF LEFT KNEE: ICD-10-CM

## 2023-01-16 DIAGNOSIS — Z48.89 AFTERCARE FOLLOWING SURGERY: Primary | ICD-10-CM

## 2023-01-16 DIAGNOSIS — G89.29 CHRONIC PAIN OF RIGHT KNEE: ICD-10-CM

## 2023-01-16 PROCEDURE — 3008F BODY MASS INDEX DOCD: CPT | Performed by: ORTHOPAEDIC SURGERY

## 2023-01-16 PROCEDURE — 1125F AMNT PAIN NOTED PAIN PRSNT: CPT | Performed by: ORTHOPAEDIC SURGERY

## 2023-01-16 PROCEDURE — 99024 POSTOP FOLLOW-UP VISIT: CPT | Performed by: ORTHOPAEDIC SURGERY

## 2023-01-16 NOTE — PROGRESS NOTES
[facility] Post-op Progress Note      Date of Surgery: 12/6/2022      History: The patient is a 69year old female presenting for postoperative follow-up over one month status post arthroscopy of the right knee. The patient reports no new redness, warmth or increased swelling. The patient has good tolerance of daily ambulation and minimal need for any medications at this point. No report of new swelling, catching, locking, instability or calf pain. Physical Exam: On examination, portal incisions are healed and there is no significant residual swelling. Adequate extensor mechanism control is noted with near full extension least 115 degrees degrees of flexion. Calves are nontender and Joselito's test is negative. Assessment: Status post right knee arthroscopy with partial lateral meniscectomy and excision lateral meniscal cyst with chondroplasty doing well    Plan: Patient is doing well approximately 1 month status post arthroscopy. Postoperative course is progressing as expected without complications. The patient was advised to continue with a home stretching and light strengthening program with expectations of return to baseline over the next 2 to 4 weeks. All questions were answered and the patient expressed understanding and appreciation. Follow-up is as needed. Montse Gil MD, [place] [address] Medicine/Knee and Shoulder  [facility] Department of Orthopaedics  Phone [phone]  Fax [phone]      This document was partially prepared using [employer] voice recognition software.   Although every attempt is made to correct errors during dictation, discrepancies may still exist.

## 2023-01-17 RX ORDER — MELOXICAM 7.5 MG/1
TABLET ORAL
Qty: 30 TABLET | Refills: 1 | Status: SHIPPED | OUTPATIENT
Start: 2023-01-17

## 2023-01-17 NOTE — TELEPHONE ENCOUNTER
DOS: 12/6/22  Last OV: yesterday 1/16/23  Last refill date: 11/9/22     #/refills: 30/1  Upcoming appt: No future appointments.

## 2023-03-06 ENCOUNTER — OFFICE VISIT (OUTPATIENT)
Dept: ORTHOPEDICS CLINIC | Facility: CLINIC | Age: 68
End: 2023-03-06
Payer: MEDICARE

## 2023-03-06 VITALS — BODY MASS INDEX: 34.2 KG/M2 | WEIGHT: 193 LBS | HEIGHT: 63 IN

## 2023-03-06 DIAGNOSIS — M25.561 CHRONIC PAIN OF RIGHT KNEE: ICD-10-CM

## 2023-03-06 DIAGNOSIS — G89.29 CHRONIC PAIN OF RIGHT KNEE: ICD-10-CM

## 2023-03-06 DIAGNOSIS — M17.11 PRIMARY OSTEOARTHRITIS OF RIGHT KNEE: Primary | ICD-10-CM

## 2023-03-06 DIAGNOSIS — M94.261 CHONDROMALACIA OF KNEE, RIGHT: ICD-10-CM

## 2023-03-06 DIAGNOSIS — M25.461 EFFUSION OF RIGHT KNEE: ICD-10-CM

## 2023-03-06 RX ORDER — TRIAMCINOLONE ACETONIDE 40 MG/ML
40 INJECTION, SUSPENSION INTRA-ARTICULAR; INTRAMUSCULAR ONCE
Status: COMPLETED | OUTPATIENT
Start: 2023-03-06 | End: 2023-03-06

## 2023-03-06 RX ADMIN — TRIAMCINOLONE ACETONIDE 40 MG: 40 INJECTION, SUSPENSION INTRA-ARTICULAR; INTRAMUSCULAR at 09:42:00

## 2023-03-09 ENCOUNTER — TELEPHONE (OUTPATIENT)
Dept: PHYSICAL THERAPY | Facility: HOSPITAL | Age: 68
End: 2023-03-09

## 2023-03-13 ENCOUNTER — OFFICE VISIT (OUTPATIENT)
Dept: PHYSICAL THERAPY | Age: 68
End: 2023-03-13
Attending: ORTHOPAEDIC SURGERY
Payer: MEDICARE

## 2023-03-13 DIAGNOSIS — G89.29 CHRONIC PAIN OF RIGHT KNEE: ICD-10-CM

## 2023-03-13 DIAGNOSIS — M17.11 PRIMARY OSTEOARTHRITIS OF RIGHT KNEE: ICD-10-CM

## 2023-03-13 DIAGNOSIS — M25.461 EFFUSION OF RIGHT KNEE: ICD-10-CM

## 2023-03-13 DIAGNOSIS — M94.261 CHONDROMALACIA OF KNEE, RIGHT: ICD-10-CM

## 2023-03-13 DIAGNOSIS — M25.561 CHRONIC PAIN OF RIGHT KNEE: ICD-10-CM

## 2023-03-13 PROCEDURE — 97162 PT EVAL MOD COMPLEX 30 MIN: CPT

## 2023-03-13 PROCEDURE — 97110 THERAPEUTIC EXERCISES: CPT

## 2023-03-15 ENCOUNTER — OFFICE VISIT (OUTPATIENT)
Dept: PHYSICAL THERAPY | Age: 68
End: 2023-03-15
Attending: ORTHOPAEDIC SURGERY
Payer: MEDICARE

## 2023-03-15 PROCEDURE — 97110 THERAPEUTIC EXERCISES: CPT

## 2023-03-15 NOTE — PROGRESS NOTES
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 Certified to 9/35/9249           Subjective: The patient reports she has extreme pains intermittently in the knee and then it subsides. She did purchase a cane but she feels more anterior knee pain when using it. HEP has been going well; she has already noticed improvements since the first day. She reports her pain levels have improved by 50% and taking longer strides have been easier. Pain: 4.5/10      Objective: see treatment log   AROM: (* denotes performed with pain)  Knee   Flexion: R 130* (100*); L 136  Extension: R 10* (15*); L 1        Assessment: The patient has progressed with ROM into flexion and extension since the last session. She reports increased pain levels with cane use after adjustment, likely due to change in gait mechanics with cane use; recommended on not using the cane at this time. She reports pain with therex at the medial and posterior aspects of the knee throughout session. Patellar mobilizations did not decrease pain levels this visit. She did require rest breaks with therex and was more painful with extension than flexion. Will continue with strengthening and ROM progression to improve gait.        Goals:   Goals: (to be met in 12 visits)  Patient is independent with progressive HEP to maintain PT gains after 3 sessions  Patient is able to reach 120 degrees of knee flexion to improve mobility with stair negotiation step to step  Patient is able to reach 1 degree of knee extension to improve ability to perform heel strike with gait   Patient is able to reach -15 degrees for hamstring flexibility to decrease Low back pain and posterior knee pain for decreased pain for walking and standing  Patient is able to improve hip abduction MMT by 1 grade to improve gait mechanics and decrease knee valgus   Patient will decrease her max pain to 5/10 as needed to improve her functional independence in 3 weeks. Patient will be independent in sleep positioning modifications to decrease waking at night by 50%. Patient will increase her quad strength to 4/5 to improve her safety with walking and decrease fall risk  Patient will be independent walking with a straight cane with good mechanics to improve safety.      Plan: Continue with strengthening exercises by adding standing calf raises to reinforce extension and prone hangs   Date: 3/15/2023  TX#: 2/12 Date:                 TX#: 3/ Date:                 TX#: 4/   man Patellar mob SI/ML grade I-II 2 mins      therex  Nustep level 3 x 5 mins  Arms and legs  PROM into extension in supine x 5 mins  PROM into flexion in flexion x 3 mins   Quad sets with towel 15 x 5\" hold   Long sitting calf stretch with DKSA strap 3 x 30\"   HS stretch with DKSA strap 3 x 30\"   Supine knee extension stretch with towel 1 x 30\", 1 x 1 min   heel slides with board and strap x 10   SAQ with small bolster x 10, 2 sec   Standing slant stretch level 1 2 x 30\"                     HEP: SAQ with foam roller     Charges: 3 therex (42 mins)       Total Timed Treatment: 42 mins   Total Treatment Time: 43 min

## 2023-03-20 ENCOUNTER — TELEPHONE (OUTPATIENT)
Dept: PHYSICAL THERAPY | Facility: HOSPITAL | Age: 68
End: 2023-03-20

## 2023-03-20 ENCOUNTER — OFFICE VISIT (OUTPATIENT)
Dept: PHYSICAL THERAPY | Age: 68
End: 2023-03-20
Attending: ORTHOPAEDIC SURGERY
Payer: MEDICARE

## 2023-03-20 PROCEDURE — 97110 THERAPEUTIC EXERCISES: CPT | Performed by: PHYSICAL THERAPIST

## 2023-03-20 NOTE — PROGRESS NOTES
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 Certified to            Subjective: The patient reports she has been feeling her knee has been buckling in the last couple of days. She has noticed improvements with gait. She no longer has pain in the back of the knee. She reports she has been sleeping better at night as her knee is no longer painful with position changes. Pain: 5/10      Objective: see treatment log   AROM: (* denotes performed with pain)  Knee   Extension: R 7 (10*); L 1        Extension la deg     Assessment: The patient has progressed with extension ROM. She continues to be limited with quad strength causing instability with gait and leading to buckling with ambulation. Added prone hangs and standing heel raises to reinforce extension range. The patient experienced medial knee pain after performing center balance on the BB board requiring a seated rest break. Will continue with strengthening and ROM to decrease pain with ADLs and improve functional mobility.        Goals:   Goals: (to be met in 12 visits)  Patient is independent with progressive HEP to maintain PT gains after 3 sessions  Patient is able to reach 120 degrees of knee flexion to improve mobility with stair negotiation step to step  Patient is able to reach 1 degree of knee extension to improve ability to perform heel strike with gait   Patient is able to reach -15 degrees for hamstring flexibility to decrease Low back pain and posterior knee pain for decreased pain for walking and standing  Patient is able to improve hip abduction MMT by 1 grade to improve gait mechanics and decrease knee valgus   Patient will decrease her max pain to 5/10 as needed to improve her functional independence in 3 weeks. Patient will be independent in sleep positioning modifications to decrease waking at night by 50%. Patient will increase her quad strength to 4/5 to improve her safety with walking and decrease fall risk  Patient will be independent walking with a straight cane with good mechanics to improve safety.      Plan: Continue with quad strengthening by adding prone TKEs and attempt 2\" stairs    Date: 3/15/2023  TX#: 2/12 Date:  3/20/2023               TX#: 3/12 Date:                 TX#: 4/   man      therex  Nustep level 3 x 5 mins  Arms and legs  PROM into extension in supine x 5 mins  PROM into flexion in flexion x 3 mins   Quad sets with towel 15 x 5\" hold   Long sitting calf stretch with DKSA strap 3 x 30\"   HS stretch with DKSA strap 3 x 30\"   Supine knee extension stretch with towel 1 x 30\", 1 x 1 min   heel slides with board and strap x 10   SAQ with small bolster x 10, 2 sec   Standing slant stretch level 1 2 x 30\"     Patellar mob SI/ML grade II-III 3 mins   Nustep level 4 x 5 mins  Arms and legs  PROM into extension in supine x 4 mins  Prone hang R/L x 2 mins   PROM into flexion in flexion x 3 mins   Quad sets with towel 15 x 5\" hold   Long sitting calf stretch with DKSA strap 3 x 30\" ND   HS stretch with DKSA strap 3 x 30\"     heel slides with board and strap x 20  SAQ with small bolster x 10, 2 sec     Standing slant stretch level 1 2 x 30\"  Standing heel raises 2 x 10  Balance board center balance x 1 min                  HEP: Reviewed    Charges: 3 therex (41 mins)       Total Timed Treatment: 41 mins   Total Treatment Time: 41 min

## 2023-03-22 ENCOUNTER — OFFICE VISIT (OUTPATIENT)
Dept: PHYSICAL THERAPY | Age: 68
End: 2023-03-22
Attending: ORTHOPAEDIC SURGERY
Payer: MEDICARE

## 2023-03-22 PROCEDURE — 97110 THERAPEUTIC EXERCISES: CPT

## 2023-03-22 NOTE — PROGRESS NOTES
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 Certified to 9/23/5623           Subjective: The patient reports she was sore after the last session at the medial and lateral knee because the balance board was very difficult; it lasted two days. She did ice for pain management. She did not perform HEP yesterday due to the soreness. She feels like her strength has improved since starting therapy as she was able to go further today when walking her dog, stating she is able to walk double the distance (6000 steps). She reports she can lay in prone without her knee hurting. Pain: 1/10      Objective: see treatment log   AROM: (* denotes performed with pain)  Knee   Extension: R 4 (7*); L 1        Assessment: The patient progressed with quad strength by completing quad sets with less feedback from towel and prone TKEs. She progressed with extension ROM this session which will allow for improved gait mechanics with increased stance time and improved heel strike. Will continue with BLE strengthening to improve gait mechanics.        Goals:   Goals: (to be met in 12 visits)  Patient is independent with progressive HEP to maintain PT gains after 3 sessions  Patient is able to reach 120 degrees of knee flexion to improve mobility with stair negotiation step to step  Patient is able to reach 1 degree of knee extension to improve ability to perform heel strike with gait   Patient is able to reach -15 degrees for hamstring flexibility to decrease Low back pain and posterior knee pain for decreased pain for walking and standing  Patient is able to improve hip abduction MMT by 1 grade to improve gait mechanics and decrease knee valgus   Patient will decrease her max pain to 5/10 as needed to improve her functional independence in 3 weeks. Patient will be independent in sleep positioning modifications to decrease waking at night by 50%. Patient will increase her quad strength to 4/5 to improve her safety with walking and decrease fall risk  Patient will be independent walking with a straight cane with good mechanics to improve safety. Plan: Continue with BLE strengthening by adding leg press and clam shells to improve gait mechanics.    Date: 3/15/2023  TX#: 2/12 Date:  3/20/2023               TX#: 3/12 Date:  3/22/2023            TX#: 4/12    man      therex  Nustep level 3 x 5 mins  Arms and legs  PROM into extension in supine x 5 mins  PROM into flexion in flexion x 3 mins   Quad sets with towel 15 x 5\" hold   Long sitting calf stretch with DKSA strap 3 x 30\"   HS stretch with DKSA strap 3 x 30\"   Supine knee extension stretch with towel 1 x 30\", 1 x 1 min   heel slides with board and strap x 10   SAQ with small bolster x 10, 2 sec   Standing slant stretch level 1 2 x 30\"     Patellar mob SI/ML grade II-III 3 mins   Nustep level 4 x 5 mins  Arms and legs  PROM into extension in supine x 4 mins  Prone hang R/L x 2 mins   PROM into flexion x 3 mins   Quad sets with towel 15 x 5\" hold   Long sitting calf stretch with DKSA strap 3 x 30\" ND   HS stretch with DKSA strap 3 x 30\"     heel slides with board and strap x 20  SAQ with small bolster x 10, 2 sec     Standing slant stretch level 1 2 x 30\"  Standing heel raises 2 x 10  Balance board center balance x 1 min   Nustep level 5 x 5 mins  Arms and legs  Patellar mob SI/ML grade II-III 3 mins  Supine ext stretch with towel x 2 mins   Long sitting calf stretch with DKSA strap 3 x 30\"  HS stretch with DKSA strap 3 x 30\"   PROM into extension in supine x 5 mins  Quad sets in supine 15 x 5\" hold - no towel roll  Prone TKEs 1 x 10 5\" holds R/L   Prone hang R/L x 2 mins  heel slides with board and strap x 20   SAQ with small bolster 2 x 5, 5 sec hold  Standing slant stretch level 1 2 x 30\"  Standing heel raises 2 x 10                      HEP: Standing heel raises with UE support     Charges: 3 therex (41 mins)       Total Timed Treatment: 41 mins   Total Treatment Time: 41 min

## 2023-03-27 ENCOUNTER — APPOINTMENT (OUTPATIENT)
Dept: PHYSICAL THERAPY | Age: 68
End: 2023-03-27
Attending: ORTHOPAEDIC SURGERY
Payer: MEDICARE

## 2023-03-28 ENCOUNTER — OFFICE VISIT (OUTPATIENT)
Dept: PHYSICAL THERAPY | Age: 68
End: 2023-03-28
Attending: INTERNAL MEDICINE
Payer: MEDICARE

## 2023-03-28 PROCEDURE — 97140 MANUAL THERAPY 1/> REGIONS: CPT

## 2023-03-28 PROCEDURE — 97112 NEUROMUSCULAR REEDUCATION: CPT

## 2023-03-28 PROCEDURE — 97110 THERAPEUTIC EXERCISES: CPT

## 2023-03-28 NOTE — PROGRESS NOTES
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 Certified to 8/97/5117           Subjective: The patient reports increased pain levels today at the medial, lateral, and anterior knee. She stepped off a high curb and placed a lot of weight through her RLE yesterday. She also negotiated the stairs (ascend and descend[place] movie theater [place], reporting pain going both ways. She reports walking longer distances than she is used to at the movie theater. She iced afterwards which helped with pain management. Pain: 6/10      Objective: see treatment log   AROM: (* denotes performed with pain)  Knee   Extension: R 4* (4*); L 1        Assessment: Decreased the difficulty on the NuStep to limit the patient's pain today. Used STM on the quadriceps and patellar tendon for pain relief. The patient was able to perform therex without increased pain levels. Added sidelying clams to improve abductor strength and improve gait mechanics. Will continue with extension ROM to improve terminal knee extension and allow for better mechanics with heel strike and work towards a symmetrical gait.        Goals:   Goals: (to be met in 12 visits)  Patient is independent with progressive HEP to maintain PT gains after 3 sessions  Patient is able to reach 120 degrees of knee flexion to improve mobility with stair negotiation step to step  Patient is able to reach 1 degree of knee extension to improve ability to perform heel strike with gait   Patient is able to reach -15 degrees for hamstring flexibility to decrease Low back pain and posterior knee pain for decreased pain for walking and standing  Patient is able to improve hip abduction MMT by 1 grade to improve gait mechanics and decrease knee valgus   Patient will decrease her max pain to 5/10 as needed to improve her functional independence in 3 weeks. Patient will be independent in sleep positioning modifications to decrease waking at night by 50%. Patient will increase her quad strength to 4/5 to improve her safety with walking and decrease fall risk  Patient will be independent walking with a straight cane with good mechanics to improve safety.      Plan: Continue with BLE strengthening and ROM with standing TKEs and leg press   Date:  3/20/2023               TX#: 3/12 Date:  3/22/2023            TX#: 4/12  Date:  3/28/2023            TX#: 5/12    man   Patellar mob SI/ML grade II-III 4 mins  STM to quadriceps and patellar tendon x 8 mins   therex  Patellar mob SI/ML grade II-III 3 mins   Nustep level 4 x 5 mins  Arms and legs  PROM into extension in supine x 4 mins  Prone hang R/L x 2 mins   PROM into flexion x 3 mins   Quad sets with towel 15 x 5\" hold   Long sitting calf stretch with DKSA strap 3 x 30\" ND   HS stretch with DKSA strap 3 x 30\"     heel slides with board and strap x 20  SAQ with small bolster x 10, 2 sec     Standing slant stretch level 1 2 x 30\"  Standing heel raises 2 x 10  Balance board center balance x 1 min   Nustep level 5 x 5 mins  Arms and legs  Patellar mob SI/ML grade II-III 3 mins  Supine ext stretch with towel x 2 mins   Long sitting calf stretch with DKSA strap 3 x 30\"  HS stretch with DKSA strap 3 x 30\"   PROM into extension in supine x 5 mins  Quad sets in supine 15 x 5\" hold - no towel roll  Prone TKEs 1 x 10 5\" holds R/L   Prone hang R/L x 2 mins  heel slides with board and strap x 20   SAQ with small bolster 2 x 5, 5 sec hold  Standing slant stretch level 1 2 x 30\"  Standing heel raises 2 x 10        Nustep level 2 x 5 mins  Arms and legs  Supine ext stretch with towel x 2 mins   PROM into extension in supine x 5 mins  Long sitting calf stretch with DKSA strap 3 x 30\"  HS stretch with DKSA strap 3 x 30\"   Prone hang R/L x 2 mins  Standing slant stretch level 2 2 x 1 min   NMR    Standing heel raises with QS 2 x 10  Prone TKEs 1 x 10 10\" holds R/L   Quad sets in supine 15 x 5\" hold - with towel roll for pain relief   Sidelying clams 1 x 10 R/L            HEP: sidelying clams   Charges: 1 therex (21 mins) 1 NMR (8 mins) , 1 man (12 mins) Total Timed Treatment: 41 mins   Total Treatment Time: 41 min

## 2023-03-30 ENCOUNTER — OFFICE VISIT (OUTPATIENT)
Dept: PHYSICAL THERAPY | Age: 68
End: 2023-03-30
Attending: INTERNAL MEDICINE
Payer: MEDICARE

## 2023-03-30 PROCEDURE — 97112 NEUROMUSCULAR REEDUCATION: CPT

## 2023-03-30 PROCEDURE — 97110 THERAPEUTIC EXERCISES: CPT

## 2023-03-30 NOTE — PROGRESS NOTES
Diagnosis:   Primary osteoarthritis of right knee (M17.11)  Effusion of right knee (M25.461)  Chronic pain of right knee (M25.561,G89.29)  Chondromalacia of knee, right (M94.261), Abnormalities of gait and mobility (R26.9)   Referring Provider: Connie Oswald Date of Evaluation:     3/13/2023    Precautions:  Fall Risk Next MD visit:   none scheduled  Date of Surgery: 12/6/2022   Insurance Primary/Secondary: [facility] / N/A     # Auth Visits: POC 12 visits; no auth needed. Certified to 4/78/7920           Subjective: She reports she woke up today with no pain. The patient reports she walked her dog for 0.75 miles this morning without any knee pain. She performed HEP at home without difficulty. She felt sore after the last session and iced for pain management. Pain: 0/10      Objective: see treatment log   AROM: (* denotes performed with pain)  Knee   Extension: R 1* (4*); L 1        Assessment: Did not use a towel roll under the knee to achieve more extension with quad sets today. She had minimal tolerable pain in the medial and lateral clamshells on the R side. Performed 10 bridges prior to reporting knee pain likely due to increased stress on the knee in CKC. She progressed with strength by performing the leg press and TKEs. She has progressed with extension range since the last session and continues to be limited due to pain. Will continue with ROM therex to reach terminal extension and strengthening to improve gait.      Goals:   Goals: (to be met in 12 visits)  Patient is independent with progressive HEP to maintain PT gains after 3 sessions  Patient is able to reach 120 degrees of knee flexion to improve mobility with stair negotiation step to step  Patient is able to reach 1 degree of knee extension to improve ability to perform heel strike with gait   Patient is able to reach -15 degrees for hamstring flexibility to decrease Low back pain and posterior knee pain for decreased pain for walking and standing  Patient is able to improve hip abduction MMT by 1 grade to improve gait mechanics and decrease knee valgus   Patient will decrease her max pain to 5/10 as needed to improve her functional independence in 3 weeks. Patient will be independent in sleep positioning modifications to decrease waking at night by 50%. Patient will increase her quad strength to 4/5 to improve her safety with walking and decrease fall risk  Patient will be independent walking with a straight cane with good mechanics to improve safety. Plan: Progress balance by using airex with tandem stance, standing hip exercises and repetitions for leg press to progress strength as tolerated.     Date:  3/22/2023            TX#: 4/12  Date:  3/28/2023            TX#: 5/12  Date:  3/30/2023            TX#: 6/12    man  Patellar mob SI/ML grade II-III 4 mins  STM to quadriceps and patellar tendon x 8 mins    therex  Nustep level 5 x 5 mins  Arms and legs  Patellar mob SI/ML grade II-III 3 mins  Supine ext stretch with towel x 2 mins   Long sitting calf stretch with DKSA strap 3 x 30\"  HS stretch with DKSA strap 3 x 30\"   PROM into extension in supine x 5 mins  Quad sets in supine 15 x 5\" hold - no towel roll  Prone TKEs 1 x 10 5\" holds R/L   Prone hang R/L x 2 mins  heel slides with board and strap x 20   SAQ with small bolster 2 x 5, 5 sec hold  Standing slant stretch level 1 2 x 30\"  Standing heel raises 2 x 10        Nustep level 2 x 5 mins  Arms and legs  Supine ext stretch with towel x 2 mins   PROM into extension in supine x 5 mins  Long sitting calf stretch with DKSA strap 3 x 30\"  HS stretch with DKSA strap 3 x 30\"   Prone hang R/L x 2 mins  Standing slant stretch level 2 2 x 1 min Nustep level 5 x 5 mins  Arms and legs  Patellar mob SI/ML grade II-III 4 mins  Supine ext stretch with towel x 2 mins   PROM into extension in supine x 4 mins  Long sitting calf stretch with DKSA strap 3 x 30\"  HS stretch with DKSA strap 3 x 30\"  Bridges 1 x 10    Prone hang R/L x 2 mins  Leg press BLE 5B 1 x 10   Leg press RLE 3B 1 x 10  Standing slant stretch level 2 1 x 1 min     NMR   Standing heel raises with QS 2 x 10  Prone TKEs 1 x 10 10\" holds R/L   Quad sets in supine 15 x 5\" hold - with towel roll for pain relief   Sidelying clams 1 x 10 R/L    Standing heel raises with QS 2 x 10  Prone TKEs 1 x 10 10\" holds R/L   Quad sets in supine 20 x 5\" hold   Sidelying clams 1 x 15 R/L  Standing TKEs 1 x 10 5\"   Tandem balance 1 x 45\" R/L          HEP: Reviewed  Charges: 2 therex (30 mins) 1 NMR (12 mins)  Total Timed Treatment: 42 mins   Total Treatment Time: 42 min

## 2023-04-03 ENCOUNTER — OFFICE VISIT (OUTPATIENT)
Dept: PHYSICAL THERAPY | Age: 68
End: 2023-04-03
Attending: ORTHOPAEDIC SURGERY
Payer: MEDICARE

## 2023-04-03 PROCEDURE — 97110 THERAPEUTIC EXERCISES: CPT

## 2023-04-03 PROCEDURE — 97112 NEUROMUSCULAR REEDUCATION: CPT

## 2023-04-03 PROCEDURE — 97530 THERAPEUTIC ACTIVITIES: CPT

## 2023-04-03 NOTE — PROGRESS NOTES
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 Certified to 3/31/8448           Subjective: She reports she wore different shoes to a party over the weekend so she was sore in the knee and legs after that, but that did resolve. She has been able to walk her dog now that her knee is hurting less. When she has pain it is all along the fornt of the knee or at the lateral knee  Pain: 0/10      Objective: see treatment log   AROM: (* denotes performed with pain)  Knee   Extension: R 1* (4*); L 1    Flexion R 134 deg        Assessment:  Patient did have some minimal knee pain with standing ther ex; focused on increasing her hip strength as that will help to improve her knee support in CKC and improve her control of stresses through the knee in CKC. Need to continue with quad strengthening to help improve her gait mechanics and her heel strike during gait.      Goals:   Goals: (to be met in 12 visits)  Patient is independent with progressive HEP to maintain PT gains after 3 sessions  Patient is able to reach 120 degrees of knee flexion to improve mobility with stair negotiation step to step  Patient is able to reach 1 degree of knee extension to improve ability to perform heel strike with gait   Patient is able to reach -15 degrees for hamstring flexibility to decrease Low back pain and posterior knee pain for decreased pain for walking and standing  Patient is able to improve hip abduction MMT by 1 grade to improve gait mechanics and decrease knee valgus   Patient will decrease her max pain to 5/10 as needed to improve her functional independence in 3 weeks. Patient will be independent in sleep positioning modifications to decrease waking at night by 50%. Patient will increase her quad strength to 4/5 to improve her safety with walking and decrease fall risk  Patient will be independent walking with a straight cane with good mechanics to improve safety. Plan: Progress balance by using Airex with tandem stance, standing hip exercises and repetitions for leg press to progress strength as tolerated.     Date:  3/22/2023            TX#: 4/12  Date:  3/28/2023            TX#: 5/12  Date:  3/30/2023            TX#: 6/12  Date:  4/3/2023            TX#: 7/12    man  Patellar mob SI/ML grade II-III 4 mins  STM to quadriceps and patellar tendon x 8 mins     therex  Nustep level 5 x 5 mins  Arms and legs  Patellar mob SI/ML grade II-III 3 mins  Supine ext stretch with towel x 2 mins   Long sitting calf stretch with DKSA strap 3 x 30\"  HS stretch with DKSA strap 3 x 30\"   PROM into extension in supine x 5 mins  Quad sets in supine 15 x 5\" hold - no towel roll  Prone TKEs 1 x 10 5\" holds R/L   Prone hang R/L x 2 mins  heel slides with board and strap x 20   SAQ with small bolster 2 x 5, 5 sec hold  Standing slant stretch level 1 2 x 30\"  Standing heel raises 2 x 10        Nustep level 2 x 5 mins  Arms and legs  Supine ext stretch with towel x 2 mins   PROM into extension in supine x 5 mins  Long sitting calf stretch with DKSA strap 3 x 30\"  HS stretch with DKSA strap 3 x 30\"   Prone hang R/L x 2 mins  Standing slant stretch level 2 2 x 1 min Nustep level 5 x 5 mins  Arms and legs  Patellar mob SI/ML grade II-III 4 mins  Supine ext stretch with towel x 2 mins   PROM into extension in supine x 4 mins  Long sitting calf stretch with DKSA strap 3 x 30\"  HS stretch with DKSA strap 3 x 30\"  Bridges 1 x 10    Prone hang R/L x 2 mins  Leg press BLE 5B 1 x 10   Leg press RLE 3B 1 x 10  Standing slant stretch level 2 1 x 1 min Nustep level 5 x 5 mins  Arms and legs  Patellar mob SI/ML grade II-III 4 mins  Supine ext stretch with towel x 2 mins   PROM into extension in supine x 4 mins  Long sitting calf stretch with DKSA strap 3 x 30\"  HS stretch with DKSA strap 3 x 30\"  Bridges 2x 10    Sidelying clams 1 x 15 R/L   Prone hang R/L x 2 mins  Leg press BLE 5B 2 x 10   Leg press RLE 3B 2 x 10 R  Standing slant stretch level 2 1 x 1 min    Lateral walks 3 laps at the bar no resistance    NMR   Standing heel raises with QS 2 x 10  Prone TKEs 1 x 10 10\" holds R/L   Quad sets in supine 15 x 5\" hold - with towel roll for pain relief   Sidelying clams 1 x 10 R/L    Standing heel raises with QS 2 x 10  Prone TKEs 1 x 10 10\" holds R/L   Quad sets in supine 20 x 5\" hold   Sidelying clams 1 x 15 R/L  Standing TKEs 1 x 10 5\"   Tandem balance 1 x 45\" R/L  Quad sets in supine x15 x 5\" hold   Standing heel raises with QS 2 x 10  Standing TKEs at wall 1 x 10 5\"   BB M/L taps x 20     Tandem balance 1 x 45\" R/L    Standing hip abduction 1 x 15 R/L           HEP: Reviewed  Charges: 2 therex (28 mins) 1 NMR (12 mins)  Total Timed Treatment: 40 mins   Total Treatment Time: 40 min

## 2023-04-07 ENCOUNTER — OFFICE VISIT (OUTPATIENT)
Dept: PHYSICAL THERAPY | Age: 68
End: 2023-04-07
Attending: ORTHOPAEDIC SURGERY
Payer: MEDICARE

## 2023-04-07 PROCEDURE — 97112 NEUROMUSCULAR REEDUCATION: CPT

## 2023-04-07 PROCEDURE — 97110 THERAPEUTIC EXERCISES: CPT

## 2023-04-07 NOTE — PROGRESS NOTES
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 Certified to 3/98/1689           Subjective: She reports she has a general soreness located at the anterior knee; this is an annoying pain. Walking has been going ok and she is able to go twice the distance as when she started but that is not as far as her usual.  She is still limping intermittently after she walks for a while; sometimes her right leg feels so heavy. She does think that her balance is feeling better. Objective: see treatment log     AROM: (* denotes performed with pain)  Knee   Extension: R 1* (1*); L 1    Flexion R 134 deg        Assessment:  Patient did have some non painful clicking in her knee with BB taps this visit. Patient does have good range of motion into both knee flexion and extension; she needs additional proximal hip and LE strength as needed to support the knee joint in CKC and to improve her gait mechanics. She did have some increased crepitus with marches on the Airex; need to progress strength and balance to improve her dynamic knee control in CKC to improve her gait pattern and safety on all terrains.     Goals:   Goals: (to be met in 12 visits)  Patient is independent with progressive HEP to maintain PT gains after 3 sessions  Patient is able to reach 120 degrees of knee flexion to improve mobility with stair negotiation step to step  Patient is able to reach 1 degree of knee extension to improve ability to perform heel strike with gait   Patient is able to reach -15 degrees for hamstring flexibility to decrease Low back pain and posterior knee pain for decreased pain for walking and standing  Patient is able to improve hip abduction MMT by 1 grade to improve gait mechanics and decrease knee valgus   Patient will decrease her max pain to 5/10 as needed to improve her functional independence in 3 weeks. Patient will be independent in sleep positioning modifications to decrease waking at night by 50%. Patient will increase her quad strength to 4/5 to improve her safety with walking and decrease fall risk  Patient will be independent walking with a straight cane with good mechanics to improve safety. Plan: Progress balance by using Airex with tandem stance, standing balance on the Airex and also strength of the quads with SAQ. Consider foam roll to the ITB pending lateral knee pain.     Date:  3/30/2023            TX#: 6/12  Date:  4/3/2023            TX#: 7/12  Date:  4/7/2023            TX#: 8/12    man      therex  Nustep level 5 x 5 mins  Arms and legs  Patellar mob SI/ML grade II-III 4 mins  Supine ext stretch with towel x 2 mins   PROM into extension in supine x 4 mins  Long sitting calf stretch with DKSA strap 3 x 30\"  HS stretch with DKSA strap 3 x 30\"  Bridges 1 x 10    Prone hang R/L x 2 mins  Leg press BLE 5B 1 x 10   Leg press RLE 3B 1 x 10  Standing slant stretch level 2 1 x 1 min   Nustep level 5 x 5 mins  Arms and legs  Patellar mob SI/ML grade II-III 4 mins  Supine ext stretch with towel x 2 mins   PROM into extension in supine x 4 mins  Long sitting calf stretch with DKSA strap 3 x 30\"  HS stretch with DKSA strap 3 x 30\"  Bridges 2x 10    Sidelying clams 1 x 15 R/L   Prone hang R/L x 2 mins  Leg press BLE 5B 2 x 10   Leg press RLE 3B 2 x 10 R  Standing slant stretch level 2 1 x 1 min    Lateral walks 3 laps at the bar no resistance  Nustep level 5 x 5 mins  Arms and legs  Patellar mob SI/ML grade II-III 4 mins  Supine ext stretch with 1/2 foam x 2 mins   PROM into extension in supine x 2 mins  Long sitting calf stretch with DKSA strap 2 x 30\"  HS stretch with DKSA strap 3 x 30\"  Bridges 2x 10    Sidelying clams x20 R/L     Leg press BLE 5B 2 x 10   Leg press RLE 3B 2 x 10 R  Standing slant stretch level 2 1 x 1 min    Lateral walks 2 laps   Monster walks 1 lap   NMR  Standing heel raises with QS 2 x 10  Prone TKEs 1 x 10 10\" holds R/L   Quad sets in supine 20 x 5\" hold   Sidelying clams 1 x 15 R/L  Standing TKEs 1 x 10 5\"   Tandem balance 1 x 45\" R/L  Quad sets in supine x15 x 5\" hold   Standing heel raises with QS 2 x 10  Standing TKEs at wall 1 x 10 5\"   BB M/L taps x 20     Tandem balance 1 x 45\" R/L    Standing hip abduction 1 x 15 R/L  Quad sets in supine x15 x 5\" hold   Standing heel raises with QS 2 x 10  Standing TKEs at wall 1 x 10 5\"   BB M/L taps x 20   Marches on Airex x 15  Tandem balance 1 x 45\" R/L    Standing hip abduction 1 x 15 R/L          HEP: HOME EXERCISE PROGRAM [[id number]]  LATERAL SIDE STEPS -  Duration 60 Seconds, Complete 1 Set, Perform 1 Times a Day  MONSTER WALK - ELASTIC BAND AT ANKLES -  Repeat 10 Times, Hold 1 Second(s), Complete 2 Sets, Perform 1 Times a Day    Charges: 2 therex (27 mins) 1 NMR (14 mins)  Total Timed Treatment: 41 mins   Total Treatment Time: 42 min

## 2023-04-11 ENCOUNTER — OFFICE VISIT (OUTPATIENT)
Dept: PHYSICAL THERAPY | Age: 68
End: 2023-04-11
Attending: ORTHOPAEDIC SURGERY
Payer: MEDICARE

## 2023-04-11 PROCEDURE — 97112 NEUROMUSCULAR REEDUCATION: CPT

## 2023-04-11 PROCEDURE — 97110 THERAPEUTIC EXERCISES: CPT

## 2023-04-11 NOTE — PROGRESS NOTES
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 Certified to 9/49/9230           Subjective: She reports that she is a little bit stiff and sore today; she did walk about one mile today and she even did some vacuuming. She is trying to work on increasing her extension range of motion in the knee. Objective: see treatment log     AROM: (* denotes performed with pain)  Knee   Extension: R 0 (1*); L 1     Assessment:  Patient did have full knee extension this visit. Continued to focus on proximal hip strength as that will help to decrease knee stresses in CKC. Also progressed with foam roll to the lateral knee to improve ITB flexibility to manage pain in the lateral knee. Need to continue with progressive strengthening and flexibility to improve her LE mechanics. Goals:   Goals: (to be met in 12 visits)  Patient is independent with progressive HEP to maintain PT gains after 3 sessions  Patient is able to reach 120 degrees of knee flexion to improve mobility with stair negotiation step to step  Patient is able to reach 1 degree of knee extension to improve ability to perform heel strike with gait   Patient is able to reach -15 degrees for hamstring flexibility to decrease Low back pain and posterior knee pain for decreased pain for walking and standing  Patient is able to improve hip abduction MMT by 1 grade to improve gait mechanics and decrease knee valgus   Patient will decrease her max pain to 5/10 as needed to improve her functional independence in 3 weeks.   Patient will be independent in sleep positioning modifications to decrease waking at night by 50%. Patient will increase her quad strength to 4/5 to improve her safety with walking and decrease fall risk  Patient will be independent walking with a straight cane with good mechanics to improve safety. Plan: Progress balance with standing balance on the Airex and increase strength of the quads with SAQ.    Date:  4/3/2023            TX#: 7/12  Date:  4/7/2023            TX#: 8/12  Date:  4/11/2023            TX#: 9/12    man      therex  Nustep level 5 x 5 mins  Arms and legs  Patellar mob SI/ML grade II-III 4 mins  Supine ext stretch with towel x 2 mins   PROM into extension in supine x 4 mins  Long sitting calf stretch with DKSA strap 3 x 30\"  HS stretch with DKSA strap 3 x 30\"  Bridges 2x 10    Sidelying clams 1 x 15 R/L   Prone hang R/L x 2 mins  Leg press BLE 5B 2 x 10   Leg press RLE 3B 2 x 10 R  Standing slant stretch level 2 1 x 1 min    Lateral walks 3 laps at the bar no resistance  Nustep level 5 x 5 mins  Arms and legs  Patellar mob SI/ML grade II-III 4 mins  Supine ext stretch with 1/2 foam x 2 mins   PROM into extension in supine x 2 mins  Long sitting calf stretch with DKSA strap 2 x 30\"  HS stretch with DKSA strap 3 x 30\"  Bridges 2x 10    Sidelying clams x20 R/L     Leg press BLE 5B 2 x 10   Leg press RLE 3B 2 x 10 R  Standing slant stretch level 2 1 x 1 min    Lateral walks 2 laps   Monster walks 1 lap Nustep level 5 x 5 mins  Arms and legs  Patellar mob SI/ML grade II-III 4 mins  Supine ext stretch with 1/2 foam x 2 mins     Long sitting calf stretch with DKSA strap 2 x 30\"  HS stretch with DKSA strap 3 x 30\"  PROM into extension in supine x 1 mins  Bridges 2x 10    Sidelying clams x20 R/L   SAQ NV    Shuttle Leg press BLE 5B 2 x 10   Shuttle Leg press RLE 3B 2 x 10 R  Standing slant stretch level 2  x 1 min    Lateral walks 2 laps     Side lying LDFR to the R ITB x 3 min      NMR  Quad sets in supine x15 x 5\" hold   Standing heel raises with QS 2 x 10  Standing TKEs at wall 1 x 10 5\"   BB M/L taps x 20     Tandem balance 1 x 45\" R/L    Standing hip abduction 1 x 15 R/L  Quad sets in supine x15 x 5\" hold   Standing heel raises with QS 2 x 10  Standing TKEs at wall 1 x 10 5\"   BB M/L taps x 20   Marches on Airex x 15  Tandem balance 1 x 45\" R/L    Standing hip abduction 1 x 15 R/L  Quad sets in supine x15 x 5\" hold   Standing heel raises with QS 2 x 10  Standing TKEs at wall 1 x 10 5\" ND    Marches on Airex x 15  Tandem balance 1 x 45\" R/L    Standing hip abduction 1 x 15 R/L          HEP: not progressed this session    Charges: 2 therex (28 mins) 1 NMR (12 mins)  Total Timed Treatment: 40 mins   Total Treatment Time: 41 min

## 2023-04-14 ENCOUNTER — OFFICE VISIT (OUTPATIENT)
Dept: PHYSICAL THERAPY | Age: 68
End: 2023-04-14
Attending: ORTHOPAEDIC SURGERY
Payer: MEDICARE

## 2023-04-14 PROCEDURE — 97112 NEUROMUSCULAR REEDUCATION: CPT

## 2023-04-14 PROCEDURE — 97110 THERAPEUTIC EXERCISES: CPT

## 2023-04-14 NOTE — PROGRESS NOTES
ProgressSummary  Pt has attended 10 visits in Physical Therapy. Diagnosis:   Primary osteoarthritis of right knee (M17.11)  Effusion of right knee (M25.461)  Chronic pain of right knee (M25.561,G89.29)  Chondromalacia of knee, right (M94.261), Abnormalities of gait and mobility (R26.9)   Referring Provider: Laura Becker Date of Evaluation:     3/13/2023    Precautions:  Fall Risk Next MD visit:   none scheduled  Date of Surgery: 12/6/2022   Insurance Primary/Secondary: Aries Barley / N/A     # Auth Visits: POC 12 visits; no auth needed. Certified to 0/70/0287           Subjective: She reports improvement in her pain and improvement in her gait. She feels that she is more stable and she is limping less and able to walk further with less pain. Yesterday she had a lot of stiffness in both of the legs; it did loosen up but it was a little bit painful. This pain yesterday the pain was 5-/610. She did use her roller at home so she didn't know if that was part of her increase in symptoms. With the improvement in her walking, she has not had to use a cane. She is having less pain with sleeping and no pain with sit to stand.  She has had improvement in the intensity and frequency of her pain; however, she is still painful with walking, limping intermittently, painful at night and with bending and straightening the knee and would like additional therapy to improve these activities    Pain: 2/10  Current functional limitations include sleeping, bending and straightening the knee, inability to negotiate stairs, stiffness with prolonged sitting, walking more than one mile, intermittent limping    Objective: see treatment log   Observation: Patient stands with a symmetrical pelvis, anterior pelvic tilt, femoral medial rotation R/L, knee valgus R > L, bilateral ankle pronation and min left una to decreased RLE weight bearing  Palpation: TTP at the medial joint line and patellar tendon on the right  Sensation: NT     AROM: Chiquita Alford denotes performed with pain)  Knee   Flexion: R 135* (100*); L 136  Extension: R 0-1* (15*); L 1        Accessory motion: min limitation in patellar mobility ML and AP R side    Flexibility:   Hamstrings: R -20 deg; L -13 deg    Strength/MMT: (* denotes performed with pain)   Hip Knee Foot/Ankle   Flexion: R 4-*/5; L 4-/5  Extension: R 3+*/5; L 3+/5  Abduction: R 4-*/5; L 4/5  ER: R 3+*/5; L 4/5  IR: R 4-*/5; L 4/5 Flexion: R 4*/5; L 4+/5  Extension: R 4-*/5; L 4+/5    DF: R 4*/5; L 5/5  PF: R 5/5; L 5/5     Special tests:   None taken  SLB R 3 sec , L 19 sec    Assessment:  Patient has made gains in her LE strength, range of motion and balance as needed to improve her safety with gait. She now has full knee extension allowing her better heel straight and use of the quads as needed to support the knee in CKC and allowing her to walk safely without a cane. She continues to have stiffness into end range extension, strenght deficits and balance limitations which limits her gait endurance and causes her to walk with an antalgic gait pattern. She would benefit from additional skilled physical therapy to address her aforementioned deficits to improve her gait mechanics and safety with all ADLS.     Goals:   Goals: (to be met in 12 visits)  Patient is independent with progressive HEP to maintain PT gains after 3 sessions MET  Patient is able to reach 120 degrees of knee flexion to improve mobility with stair negotiation step to step PARTIALLY MET  Patient is able to reach 1 degree of knee extension to improve ability to perform heel strike with gait MET  Patient is able to reach -15 degrees for hamstring flexibility to decrease Low back pain and posterior knee pain for decreased pain for walking and standing WORKING TOWARD   Patient is able to improve hip abduction MMT by 1 grade to improve gait mechanics and decrease knee valgus WORKING TOWARD   Patient will decrease her max pain to 5/10 as needed to improve her functional independence in 3 weeks. MET  Patient will be independent in sleep positioning modifications to decrease waking at night by 50%. WORKING TOWARD  Patient will increase her quad strength to 4/5 to improve her safety with walking and decrease fall risk MET  Patient will be independent walking with a straight cane with good mechanics to improve safety. NA - GOAL CHANGED TO INDEPENDENT GAIT      LEFS to be taken next session    Plan: Continue skilled Physical Therapy 1-2 x/week or a total of 8 visits over a 90 day period. Treatment will include: manual therapy, range of motion exercises, flexibility exercises, strengthening exercises, postural re-ed, neuromuscular re-education, CKC exercises, balance exercises, and HEP instruction all  to improve her functional independence         Patient/Family/Caregiver was advised of these findings, precautions, and treatment options and has agreed to actively participate in planning and for this course of care. Thank you for your referral. If you have any questions, please contact me at Dept: [phone]. Sincerely,  Electronically signed by therapist: Avelina Min PT     Physician's certification required:  Yes  Please co-sign or sign and return this letter via fax as soon as possible to [phone]. I certify the need for these services furnished under this plan of treatment and while under my care.     X___________________________________________________ Date____________________    Certification From: 1/68/7517  To:7/13/2023        Date:  4/3/2023            TX#: 7/12  Date:  4/7/2023            TX#: 8/12  Date:  4/11/2023            TX#: 9/12  Date:  4/14/2023            TX#: 10/12    man       therex  Nustep level 5 x 5 mins  Arms and legs  Patellar mob SI/ML grade II-III 4 mins  Supine ext stretch with towel x 2 mins   PROM into extension in supine x 4 mins  Long sitting calf stretch with DKSA strap 3 x 30\"  HS stretch with DKSA strap 3 x 30\"  Bridges 2x 10    Sidelying clams 1 x 15 R/L   Prone hang R/L x 2 mins  Leg press BLE 5B 2 x 10   Leg press RLE 3B 2 x 10 R  Standing slant stretch level 2 1 x 1 min    Lateral walks 3 laps at the bar no resistance  Nustep level 5 x 5 mins  Arms and legs  Patellar mob SI/ML grade II-III 4 mins  Supine ext stretch with 1/2 foam x 2 mins   PROM into extension in supine x 2 mins  Long sitting calf stretch with DKSA strap 2 x 30\"  HS stretch with DKSA strap 3 x 30\"  Bridges 2x 10    Sidelying clams x20 R/L     Leg press BLE 5B 2 x 10   Leg press RLE 3B 2 x 10 R  Standing slant stretch level 2 1 x 1 min    Lateral walks 2 laps   Monster walks 1 lap Nustep level 5 x 5 mins  Arms and legs  Patellar mob SI/ML grade II-III 4 mins  Supine ext stretch with 1/2 foam x 2 mins     Long sitting calf stretch with DKSA strap 2 x 30\"  HS stretch with DKSA strap 3 x 30\"  PROM into extension in supine x 1 mins  Bridges 2x 10    Sidelying clams x20 R/L   SAQ NV    Shuttle Leg press BLE 5B 2 x 10   Shuttle Leg press RLE 3B 2 x 10 R  Standing slant stretch level 2  x 1 min    Lateral walks 2 laps     Side lying LDFR to the R ITB x 3 min    Nustep level 5 x 5 mins  Arms and legs  Patellar mob SI/ML grade II-III 4 mins  Supine ext stretch with 1/2 foam x 2 mins     Long sitting calf stretch with DKSA strap 2 x 30\"  HS stretch with DKSA strap 3 x 30\"  PROM into extension in supine x 1 mins  Bridges 2x 10    Sidelying clams x20 R/L   SAQ x 10, 5\"    Shuttle Leg press BLE 5B 2 x 10   Shuttle Leg press RLE 3B 2 x 10 R  Standing slant stretch level 2  x 1 min    Lateral walks 2 laps   Fwd step up 4 inch box x10    Side lying LDFR to the R ITB x 3 min      NMR  Quad sets in supine x15 x 5\" hold   Standing heel raises with QS 2 x 10  Standing TKEs at wall 1 x 10 5\"   BB M/L taps x 20     Tandem balance 1 x 45\" R/L    Standing hip abduction 1 x 15 R/L  Quad sets in supine x15 x 5\" hold   Standing heel raises with QS 2 x 10  Standing TKEs at wall 1 x 10 5\"   BB M/L taps x 20   Marches on Airex x 15  Tandem balance 1 x 45\" R/L    Standing hip abduction 1 x 15 R/L  Quad sets in supine x15 x 5\" hold   Standing heel raises with QS 2 x 10  Standing TKEs at wall 1 x 10 5\" ND    Marches on Airex x 15  Tandem balance 1 x 45\" R/L    Standing hip abduction 1 x 15 R/L  Quad sets in supine x15 x 5\" hold   Standing heel raises with QS 2 x 10  Standing TKEs at wall 1 x 10 5\" ND    Marches on Airex x 15  Tandem balance 1 x 45\" R/L    Standing hip abduction 1 x 15 R/L  Airex           HEP: reviewed    Charges: 2 therex (29 mins) 1 NMR (13 mins)  Total Timed Treatment: 42 mins   Total Treatment Time: 43 min

## 2023-04-17 ENCOUNTER — OFFICE VISIT (OUTPATIENT)
Dept: PHYSICAL THERAPY | Age: 68
End: 2023-04-17
Attending: ORTHOPAEDIC SURGERY
Payer: MEDICARE

## 2023-04-17 PROCEDURE — 97112 NEUROMUSCULAR REEDUCATION: CPT

## 2023-04-17 PROCEDURE — 97110 THERAPEUTIC EXERCISES: CPT

## 2023-04-17 NOTE — PROGRESS NOTES
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 Certified to 7/8/1969           Subjective:  Patient reports that she is only having some minimal soreness in the knee today. She was able to do some walking over the weekend; she thinks that she was only minimally limping over the weekend. Pain: 0/10  Current functional limitations include sleeping, bending and straightening the knee, inability to negotiate stairs, stiffness with prolonged sitting, walking more than one mile, intermittent limping    Objective: see treatment log      Assessment:  Patient did have minimal right knee pain with ther ex this visits. Progressed reps with anterior step ups and also added lateral step ups to help promote additional LE strength; however, lateral step ups did significantly increase pain in her knee.    Continue with quad strengthening as needed to improve her knee stability in CKC; needs reinforcement    Goals:   Goals: (to be met in 12 visits)  Patient is independent with progressive HEP to maintain PT gains after 3 sessions MET  Patient is able to reach 120 degrees of knee flexion to improve mobility with stair negotiation step to step PARTIALLY MET  Patient is able to reach 1 degree of knee extension to improve ability to perform heel strike with gait MET  Patient is able to reach -15 degrees for hamstring flexibility to decrease Low back pain and posterior knee pain for decreased pain for walking and standing WORKING TOWARD   Patient is able to improve hip abduction MMT by 1 grade to improve gait mechanics and decrease knee valgus WORKING TOWARD   Patient will decrease her max pain to 5/10 as needed to improve her functional independence in 3 weeks. MET  Patient will be independent in sleep positioning modifications to decrease waking at night by 50%. WORKING TOWARD  Patient will increase her quad strength to 4/5 to improve her safety with walking and decrease fall risk MET  Patient will be independent walking with a straight cane with good mechanics to improve safety. NA - GOAL CHANGED TO INDEPENDENT GAIT      LEFS to be taken next session    Plan: Continue with strengthening in CKC; trial penguins in standing and add resistance with clamshell as tolerated to manage knee stresses in weight bearing.     Certification From: 7/16/3184  To:7/13/2023        Date:  4/11/2023            TX#: 9/12  Date:  4/14/2023            TX#: 10/12  Date:  4/17/2023            TX#: 11/18    man      therex  Nustep level 5 x 5 mins  Arms and legs  Patellar mob SI/ML grade II-III 4 mins  Supine ext stretch with 1/2 foam x 2 mins     Long sitting calf stretch with DKSA strap 2 x 30\"  HS stretch with DKSA strap 3 x 30\"  PROM into extension in supine x 1 mins  Bridges 2x 10    Sidelying clams x20 R/L   SAQ NV    Shuttle Leg press BLE 5B 2 x 10   Shuttle Leg press RLE 3B 2 x 10 R  Standing slant stretch level 2  x 1 min    Lateral walks 2 laps     Side lying LDFR to the R ITB x 3 min    Nustep level 5 x 5 mins  Arms and legs  Patellar mob SI/ML grade II-III 4 mins  Supine ext stretch with 1/2 foam x 2 mins     Long sitting calf stretch with DKSA strap 2 x 30\"  HS stretch with DKSA strap 3 x 30\"  PROM into extension in supine x 1 mins  Bridges 2x 10    Sidelying clams x20 R/L   SAQ x 10, 5\"    Shuttle Leg press BLE 5B 2 x 10   Shuttle Leg press RLE 3B 2 x 10 R  Standing slant stretch level 2  x 1 min    Lateral walks 2 laps   Fwd step up 4 inch box x10    Side lying LDFR to the R ITB x 3 min    Nustep level 5 x 5 mins  Arms and legs  Patellar mob SI/ML grade II-III 4 mins  Supine ext stretch with 1/2 foam x 1 mins     Long sitting calf stretch with DKSA strap 2 x 30\"  HS stretch with DKSA strap 3 x 30\"  PROM into extension in supine x 1 mins  Bridges x15    Sidelying clams x20 R/L   SAQ x 10, 5\"    Shuttle Leg press BLE 5B 2 x 10   Shuttle Leg press RLE 3B 2 x 10 R  Standing slant stretch level 2  x 1 min    Lateral walks 2 laps     Fwd step up 4 inch box x12  Lat step up 4 inch box x  5    Side lying LDFR to the R ITB x 3 min    NMR  Quad sets in supine x15 x 5\" hold   Standing heel raises with QS 2 x 10  Standing TKEs at wall 1 x 10 5\" ND    Marches on Airex x 15  Tandem balance 1 x 45\" R/L    Standing hip abduction 1 x 15 R/L  Quad sets in supine x15 x 5\" hold   Standing heel raises with QS 2 x 10  Standing TKEs at wall 1 x 10 5\" ND    Marchkristen on Airex x 15  Tandem balance 1 x 45\" R/L    Standing hip abduction 1 x 15 R/L  Airex  Quad sets in supine x15 x 5\" hold  Seated LAQ x 10, 5\"  Standing heel raises with QS 2 x 10  Standing TKEs at wall 1 x 10 5\" ND    Marches on Airex x 20  Tandem balance 1 x 45\" R/L    Standing hip abduction 1 x 15 R/L  Airex          HEP: reviewed    Charges: 2 therex (26 mins) 1 NMR (14 mins)  Total Timed Treatment: 40 mins   Total Treatment Time: 40 min

## 2023-04-21 ENCOUNTER — OFFICE VISIT (OUTPATIENT)
Dept: PHYSICAL THERAPY | Age: 68
End: 2023-04-21
Attending: ORTHOPAEDIC SURGERY
Payer: MEDICARE

## 2023-04-21 PROCEDURE — 97110 THERAPEUTIC EXERCISES: CPT

## 2023-04-21 PROCEDURE — 97112 NEUROMUSCULAR REEDUCATION: CPT

## 2023-04-21 NOTE — PROGRESS NOTES
Diagnosis:   Primary osteoarthritis of right knee (M17.11)  Effusion of right knee (M25.461)  Chronic pain of right knee (M25.561,G89.29)  Chondromalacia of knee, right (M94.261), Abnormalities of gait and mobility (R26.9)   Referring Provider: Casie Landin Date of Evaluation:     3/13/2023    Precautions:  Fall Risk Next MD visit:   none scheduled  Date of Surgery: 12/6/2022   Insurance Primary/Secondary: Kwame Mora / N/A     # Auth Visits: POC 18 visits; no auth needed. Certified to 6/7/0645           Subjective:  Patient reports that she has been able to walk about one mile the last couple of days with her dog; she did have to use some ice after. She feels more confident with the knee with walking    Pain: 2/10  Current functional limitations include sleeping, bending and straightening the knee, inability to negotiate stairs, stiffness with prolonged sitting, walking more than one mile, intermittent limping    Objective: see treatment log      Assessment: Progressed reps with forward step us and held lateral step ups as that increased pain in the knee. Continued with progressive strengthening as that will help to improve her pain by increasing her joint support since her range of motion into extension has improved. Need to reinforce strength to allow her better dynamic knee control and improved balance for safety and endurance with walking.      Goals:   Goals: (to be met in 12 visits)  Patient is independent with progressive HEP to maintain PT gains after 3 sessions MET  Patient is able to reach 120 degrees of knee flexion to improve mobility with stair negotiation step to step PARTIALLY MET  Patient is able to reach 1 degree of knee extension to improve ability to perform heel strike with gait MET  Patient is able to reach -15 degrees for hamstring flexibility to decrease Low back pain and posterior knee pain for decreased pain for walking and standing WORKING TOWARD   Patient is able to improve hip abduction MMT by 1 grade to improve gait mechanics and decrease knee valgus WORKING TOWARD   Patient will decrease her max pain to 5/10 as needed to improve her functional independence in 3 weeks. MET  Patient will be independent in sleep positioning modifications to decrease waking at night by 50%. WORKING TOWARD  Patient will increase her quad strength to 4/5 to improve her safety with walking and decrease fall risk MET  Patient will be independent walking with a straight cane with good mechanics to improve safety.  NA - GOAL CHANGED TO INDEPENDENT GAIT      Plan: Continue with strengthening and balance to improve gait;add resistance with clamshell as tolerated to manage knee stresses in weight bearing by increasing her proximal support    Certification From: 0/13/1542  To:7/13/2023        Date:  4/14/2023            TX#: 10/12  Date:  4/17/2023            TX#: 11/18  Date:  4/21/2023            TX#: 12/18    man      therex  Nustep level 5 x 5 mins  Arms and legs  Patellar mob SI/ML grade II-III 4 mins  Supine ext stretch with 1/2 foam x 2 mins     Long sitting calf stretch with DKSA strap 2 x 30\"  HS stretch with DKSA strap 3 x 30\"  PROM into extension in supine x 1 mins  Bridges 2x 10    Sidelying clams x20 R/L   SAQ x 10, 5\"    Shuttle Leg press BLE 5B 2 x 10   Shuttle Leg press RLE 3B 2 x 10 R  Standing slant stretch level 2  x 1 min    Lateral walks 2 laps   Fwd step up 4 inch box x10    Side lying LDFR to the R ITB x 3 min    Nustep level 5 x 5 mins  Arms and legs  Patellar mob SI/ML grade II-III 4 mins  Supine ext stretch with 1/2 foam x 1 mins     Long sitting calf stretch with DKSA strap 2 x 30\"  HS stretch with DKSA strap 3 x 30\"  PROM into extension in supine x 1 mins  Bridges x15    Sidelying clams x20 R/L   SAQ x 10, 5\"    Shuttle Leg press BLE 5B 2 x 10   Shuttle Leg press RLE 3B 2 x 10 R  Standing slant stretch level 2  x 1 min    Lateral walks 2 laps     Fwd step up 4 inch box x12  Lat step up 4 inch box x 5    Side lying LDFR to the R ITB x 3 min  Nustep level 5 x 5 mins  Arms and legs  Patellar mob SI/ML grade II-III 4 mins  Supine ext stretch with 1/2 foam x 1 mins     Long sitting calf stretch with DKSA strap 2 x 30\"  HS stretch with DKSA strap 3 x 30\"  PROM into extension in supine x 1 mins  Bridges x15    Sidelying clams x20 R/L   SAQ x 15, 5\"    Shuttle Leg press BLE 5B 2 x 10   Shuttle Leg press RLE 3B 2 x 10 R  Standing slant stretch level 2  x 1 min    Lateral walks 2 laps     Fwd step up 4 inch box x15    Penguins x 10 alternating no resistance   Side lying LDFR to the R ITB x 3 min    NMR  Quad sets in supine x15 x 5\" hold   Standing heel raises with QS 2 x 10  Standing TKEs at wall 1 x 10 5\" ND    Marches on Airex x 15  Tandem balance 1 x 45\" R/L    Standing hip abduction 1 x 15 R/L  Airex  Quad sets in supine x15 x 5\" hold  Seated LAQ x 10, 5\"  Standing heel raises with QS 2 x 10  Standing TKEs at wall 1 x 10 5\" ND    Marches on Airex x 20  Tandem balance 1 x 45\" R/L    Standing hip abduction 1 x 15 R/L  Airex  Quad sets in supine x15 x 5\" hold    Standing heel raises with QS 2 x 10  Standing TKEs at wall 1 x 15 5\"     Marches on Airex x 20  Tandem balance 1 x 45\" R/L    Standing hip abduction 1 x 15 R/L  Airex          HEP: reviewed    Charges: 2 therex (28 mins) 1 NMR (14 mins)  Total Timed Treatment: 42 mins   Total Treatment Time: 42 min

## 2023-04-24 ENCOUNTER — OFFICE VISIT (OUTPATIENT)
Dept: PHYSICAL THERAPY | Age: 68
End: 2023-04-24
Attending: ORTHOPAEDIC SURGERY
Payer: MEDICARE

## 2023-04-24 PROCEDURE — 97112 NEUROMUSCULAR REEDUCATION: CPT

## 2023-04-24 PROCEDURE — 97110 THERAPEUTIC EXERCISES: CPT

## 2023-04-24 NOTE — PROGRESS NOTES
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 Certified to 9/2/4306           Subjective:  Patient reports that her pain has been more in the knee today and over the weekend. She is usually sore after her exercises and then she will also have some stiffness in her knee. She feels that she is painful because of the arthritis in the knee. She has not attempted to do any stairs, but she doesn't have to do the stairs. She is frustrated because she is overall having more pain today. Pain: 6/10  Current functional limitations include sleeping, bending and straightening the knee, inability to negotiate stairs, stiffness with prolonged sitting, walking more than one mile, intermittent limping    Objective: see treatment log     AROM 1 - 133 deg      Assessment: Patient was unable to complete all reps with resistance with side lying clams this visit due to increased baseline knee pain. She did not have any significant change in her knee range of motion this visit and she continued to walk with a heel toe gait pattern with min decreased right stance time. Need to continue with strengthening to help decrease joint stresses.     Goals:   Goals: (to be met in 12 visits)  Patient is independent with progressive HEP to maintain PT gains after 3 sessions MET  Patient is able to reach 120 degrees of knee flexion to improve mobility with stair negotiation step to step PARTIALLY MET  Patient is able to reach 1 degree of knee extension to improve ability to perform heel strike with gait MET  Patient is able to reach -15 degrees for hamstring flexibility to decrease Low back pain and posterior knee pain for decreased pain for walking and standing WORKING TOWARD   Patient is able to improve hip abduction MMT by 1 grade to improve gait mechanics and decrease knee valgus WORKING TOWARD   Patient will decrease her max pain to 5/10 as needed to improve her functional independence in 3 weeks. MET  Patient will be independent in sleep positioning modifications to decrease waking at night by 50%. WORKING TOWARD  Patient will increase her quad strength to 4/5 to improve her safety with walking and decrease fall risk MET  Patient will be independent walking with a straight cane with good mechanics to improve safety. NA - GOAL CHANGED TO INDEPENDENT GAIT      Plan: Continue with strengthening and balance to improve gait and progress to DC at the next session with HEP.     Certification From: 6/87/0507  To:7/13/2023        Date:  4/17/2023            TX#: 11/18  Date:  4/21/2023            TX#: 12/18  Date:  4/24/2023            TX#: 13/18    man      therex  Nustep level 5 x 5 mins  Arms and legs  Patellar mob SI/ML grade II-III 4 mins  Supine ext stretch with 1/2 foam x 1 mins     Long sitting calf stretch with DKSA strap 2 x 30\"  HS stretch with DKSA strap 3 x 30\"  PROM into extension in supine x 1 mins  Bridges x15    Sidelying clams x20 R/L   SAQ x 10, 5\"    Shuttle Leg press BLE 5B 2 x 10   Shuttle Leg press RLE 3B 2 x 10 R  Standing slant stretch level 2  x 1 min    Lateral walks 2 laps     Fwd step up 4 inch box x12  Lat step up 4 inch box x  5    Side lying LDFR to the R ITB x 3 min  Nustep level 5 x 5 mins  Arms and legs  Patellar mob SI/ML grade II-III 4 mins  Supine ext stretch with 1/2 foam x 1 mins     Long sitting calf stretch with DKSA strap 2 x 30\"  HS stretch with DKSA strap 3 x 30\"  PROM into extension in supine x 1 mins  Bridges x15    Sidelying clams x20 R/L   SAQ x 15, 5\"    Shuttle Leg press BLE 5B 2 x 10 Shuttle Leg press RLE 3B 2 x 10 R  Standing slant stretch level 2  x 1 min    Lateral walks 2 laps     Fwd step up 4 inch box x15    Penguins x 10 alternating no resistance   Side lying LDFR to the R ITB x 3 min  Nustep level 5 x 5 mins  Arms and legs  Patellar mob SI/ML grade II-III 4 mins  Supine ext stretch with 1/2 foam x 1 mins     Long sitting calf stretch with DKSA strap 2 x 30\"  HS stretch with DKSA strap 3 x 30\"  PROM into extension in supine x 1 mins  Bridges x15    Sidelying clams x20 R/L  YTB (10 on left without band)  SAQ x 15, 5\" ND    Shuttle Leg press BLE 5B 2 x 10   Shuttle Leg press RLE 2B 2 x 10 R  Standing slant stretch level 2  x 1 min    Lateral walks 2 laps     Fwd step up 4 inch box x15    Penguins x 10 alternating no resistance   Side lying LDFR to the R ITB x 4 min    NMR  Quad sets in supine x15 x 5\" hold  Seated LAQ x 10, 5\"  Standing heel raises with QS 2 x 10  Standing TKEs at wall 1 x 10 5\" ND    Marches on Airex x 20  Tandem balance 1 x 45\" R/L    Standing hip abduction 1 x 15 R/L  Airex  Quad sets in supine x15 x 5\" hold    Standing heel raises with QS 2 x 10  Standing TKEs at wall 1 x 15 5\"     Marches on Airex x 20  Tandem balance 1 x 45\" R/L    Standing hip abduction 1 x 15 R/L  Airex  Quad sets in supine x15 x 5\" hold    Standing heel raises with QS 2 x 10  Standing TKEs at wall 1 x 15 5\"     Marches on Airex x 20  Tandem balance 1 x 45\" R/L    Standing hip abduction 1 x 15 R/L  Airex ND         HEP: reviewed    Charges: 2 therex (28 mins) 1 NMR (12 mins)  Total Timed Treatment: 40 mins   Total Treatment Time: 40 min

## 2023-04-28 ENCOUNTER — OFFICE VISIT (OUTPATIENT)
Dept: PHYSICAL THERAPY | Age: 68
End: 2023-04-28
Attending: ORTHOPAEDIC SURGERY
Payer: MEDICARE

## 2023-04-28 PROCEDURE — 97112 NEUROMUSCULAR REEDUCATION: CPT

## 2023-04-28 PROCEDURE — 97110 THERAPEUTIC EXERCISES: CPT

## 2023-04-28 NOTE — PROGRESS NOTES
[place]  Pt has attended 14 visits in Physical Therapy. 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 Certified to 3/9/3069           Subjective:  Patient reports improvement in her knee pain and range of motion since beginning physical therapy . She can now move around more and do more than she could when she started therapy. Her knee is feeling ok; she is a little bit sore today; she was able to walk 8000 steps yesterday so she was a little sore at night but the pain manageable pain. She does have some minimal pain at night but this is less intense. She is less stiff with prolonged sitting and she is able to walk further without limping that she started therapy.  She does not feel the need to use a cane or a device at this time and she feels much more confident with her knee and feels that the pain she has is more arthritic pain that she can manage on her own at this time     Pain: 6/10  Current functional limitations include bending and straightening the knee,  stairs, stiffness with prolonged sitting    Objective: see treatment log   Observation: Patient stands with a symmetrical pelvis, anterior pelvic tilt, femoral medial rotation R/L, knee valgus R > L, bilateral ankle pronation and symmetrical LE weight bearing  Palpation: TTP at the patellar tendon on the right  Sensation: NT     AROM: (* denotes performed with pain)  Knee   Flexion: R 135* (135*); L 136  Extension: R 0* (1*); L 1     Accessory motion: min limitation in patellar mobility S/I R side    Flexibility:   Hamstrings: R -15 deg; L -13 deg    Strength/MMT: (* denotes performed with pain)   Hip Knee Foot/Ankle   Flexion: R 4-*/5; L 4-/5  Extension: R 3+*/5; L 3+/5  Abduction: R 4*/5; L 4/5  ER: R 3+*/5; L 4/5  IR: R 4-*/5; L 4/5 Flexion: R 4*/5; L 4+/5  Extension: R 4-*/5; L 4+/5    DF: R 4*/5; L 5/5  PF: R 5/5; L 5/5     Special tests:     SLB R 5 sec , L 19 sec      Assessment:  Patient has made significant gains in her range of motion and strenght since beginning physical therapy. She has full knee extension allowing her to improve her quad contraction as needed to stabilize and support the knee in CKC. In addition, improved proximal hip strenght and knee range of motion has improved her LE mechanics and allowed for better tolerance to prolonged weight bearing/gait and decreased her fall risk. She is independent in a progressive HEP and is appropriate to discharge at this time with independent progression as she has minimal functional deficits. Goals:   Goals: (to be met in 12 visits)  Patient is independent with progressive HEP to maintain PT gains after 3 sessions MET  Patient is able to reach 120 degrees of knee flexion to improve mobility with stair negotiation step to step PARTIALLY MET  Patient is able to reach 1 degree of knee extension to improve ability to perform heel strike with gait MET  Patient is able to reach -15 degrees for hamstring flexibility to decrease Low back pain and posterior knee pain for decreased pain for walking and standing MET   Patient is able to improve hip abduction MMT by 1 grade to improve gait mechanics and decrease knee valgus MET  Patient will decrease her max pain to 5/10 as needed to improve her functional independence in 3 weeks. MET  Patient will be independent in sleep positioning modifications to decrease waking at night by 50%.   MET  Patient will increase her quad strength to 4/5 to improve her safety with walking and decrease fall risk MET  Patient will be independent walking good mechanics to improve safety. MET      Post LEFS Score  Post LEFS Score: 52.5 % (4/19/2023  5:50 PM)    23.75 % improvement    Plan: DC with HEP. Patient was instructed to follow up with their physician should an exacerbation of symptoms arise. Patient/Family/Caregiver was advised of these findings, precautions, and treatment options and has agreed to actively participate in planning and for this course of care. Thank you for your referral. If you have any questions, please contact me at Dept: [phone]. Sincerely,  Electronically signed by therapist: Moe Riggs PT     Physician's certification required:  No  Please co-sign or sign and return this letter via fax as soon as possible to [phone]. I certify the need for these services furnished under this plan of treatment and while under my care.     X___________________________________________________ Date____________________    Certification From: 5/63/6802  To:7/27/2023        Date:  4/21/2023            TX#: 12/18  Date:  4/24/2023            TX#: 13/18  Date:  4/28/2023            TX#: 14/18    man      therex  Nustep level 5 x 5 mins  Arms and legs  Patellar mob SI/ML grade II-III 4 mins  Supine ext stretch with 1/2 foam x 1 mins     Long sitting calf stretch with DKSA strap 2 x 30\"  HS stretch with DKSA strap 3 x 30\"  PROM into extension in supine x 1 mins  Bridges x15    Sidelying clams x20 R/L   SAQ x 15, 5\"    Shuttle Leg press BLE 5B 2 x 10   Shuttle Leg press RLE 3B 2 x 10 R  Standing slant stretch level 2  x 1 min    Lateral walks 2 laps     Fwd step up 4 inch box x15    Penguins x 10 alternating no resistance   Side lying LDFR to the R ITB x 3 min  Nustep level 5 x 5 mins  Arms and legs  Patellar mob SI/ML grade II-III 4 mins  Supine ext stretch with 1/2 foam x 1 mins     Long sitting calf stretch with DKSA strap 2 x 30\"  HS stretch with DKSA strap 3 x 30\"  PROM into extension in supine x 1 mins  Bridges x15 Sidelying clams x20 R/L  YTB (10 on left without band)  SAQ x 15, 5\" ND    Shuttle Leg press BLE 5B 2 x 10   Shuttle Leg press RLE 2B 2 x 10 R  Standing slant stretch level 2  x 1 min    Lateral walks 2 laps     Fwd step up 4 inch box x15    Penguins x 10 alternating no resistance   Side lying LDFR to the R ITB x 4 min  Nustep level 5 x 5 mins  Arms and legs  Patellar mob SI/ML grade II-III 4 mins  Supine ext stretch with 1/2 foam x 1 mins     Long sitting calf stretch with DKSA strap 2 x 30\"  HS stretch with DKSA strap 3 x 30\"  PROM into extension in supine x 1 mins  Bridges x15    Sidelying clams x20 R/L        Shuttle Leg press BLE 5B 2 x 10   Shuttle Leg press RLE 3B 2 x 10 R  Standing slant stretch level 2  x 1 min    Lateral walks 2 laps     Fwd step up 4 inch box x15    Penguins x 10 alternating no resistance   Side lying LDFR to the R ITB x 4 min    NMR  Quad sets in supine x15 x 5\" hold    Standing heel raises with QS 2 x 10  Standing TKEs at wall 1 x 15 5\"     Marches on Airex x 20  Tandem balance 1 x 45\" R/L    Standing hip abduction 1 x 15 R/L  Airex  Quad sets in supine x15 x 5\" hold    Standing heel raises with QS 2 x 10  Standing TKEs at wall 1 x 15 5\"     Marches on Airex x 20  Tandem balance 1 x 45\" R/L    Standing hip abduction 1 x 15 R/L  Airex ND Quad sets in supine x15 x 5\" hold    Standing heel raises with QS 2 x 10  Standing TKEs at wall 1 x 15 5\"     Marches on Airex x 20  Tandem balance 1 x 45\" R/L    Standing hip abduction 1 x 15 R/L  Airex ND         HEP: reviewed final HEP    Charges: 2 therex (28 mins) 1 NMR (12 mins)  Total Timed Treatment: 40 mins   Total Treatment Time: 40 min

## 2023-05-01 DIAGNOSIS — M25.561 CHRONIC PAIN OF RIGHT KNEE: ICD-10-CM

## 2023-05-01 DIAGNOSIS — M25.462 EFFUSION OF LEFT KNEE: ICD-10-CM

## 2023-05-01 DIAGNOSIS — G89.29 CHRONIC PAIN OF RIGHT KNEE: ICD-10-CM

## 2023-05-01 DIAGNOSIS — M17.11 PRIMARY OSTEOARTHRITIS OF RIGHT KNEE: ICD-10-CM

## 2023-05-01 NOTE — TELEPHONE ENCOUNTER
Tello Arias PA-C sent to [facility] [place]  Caller: Unspecified (Today, 12:57 AM)  Please have patient confirm with PCP ok to continue with NSAIDS thank you

## 2023-05-02 RX ORDER — MELOXICAM 7.5 MG/1
7.5 TABLET ORAL DAILY
Qty: 30 TABLET | Refills: 1 | Status: SHIPPED | OUTPATIENT
Start: 2023-05-02

## 2023-05-02 NOTE — TELEPHONE ENCOUNTER
Received a call from office of [employer] PA. Spoke to [facility] [place].      Dr. Elda Lewis: they would like to confirm if patient can continue with NSAIDS: meloxicam?

## 2023-05-15 NOTE — TELEPHONE ENCOUNTER
Please review. Protocol failed/No protocol      Requested Prescriptions   Pending Prescriptions Disp Refills    LOSARTAN 100 MG Oral Tab [Pharmacy Med Name: LOSARTAN POTASSIUM 100 MG TAB] 90 tablet 1     Sig: TAKE 1 TABLET BY MOUTH EVERY DAY       Hypertensive Medications Protocol Failed - 5/12/2023  9:38 AM        Failed - Last BP reading less than 140/90     BP Readings from Last 1 Encounters:  12/06/22 : (!) 164/89                Passed - In person appointment in the past 12 or next 3 months     Recent Outpatient Visits              2 weeks ago     [facility] in [address] [place] [address], PT    Office Visit    3 weeks ago     [facility] in [address] [place] [address], [place]    Office Visit    3 weeks ago     [facility] in [facility], [place]    Office Visit    4 weeks ago     [facility] in [facility], PT    Office Visit    1 month ago     [facility] in [facility], PT    Office Visit                      Passed - Akila sonny [facility] 112 or BMP in past 6 months     Recent Results (from the past 4392 hour(s))   COMP METABOLIC PANEL (14)    Collection Time: 11/22/22  7:38 AM   Result Value Ref Range    Glucose 95 70 - 99 mg/dL    Sodium 140 136 - 145 mmol/L    Potassium 3.8 3.5 - 5.1 mmol/L    Chloride 107 98 - 112 mmol/L    CO2 30.0 21.0 - 32.0 mmol/L    Anion Gap 3 0 - 18 mmol/L    BUN 16 7 - 18 mg/dL    Creatinine 0.78 0.55 - 1.02 mg/dL    BUN/CREA Ratio 20.5 (H) 10.0 - 20.0    Calcium, Total 9.6 8.5 - 10.1 mg/dL    Calculated Osmolality 291 275 - 295 mOsm/kg    eGFR-Cr 82 >=60 mL/min/1.73m2    ALT 21 13 - 56 U/L    AST 12 (L) 15 - 37 U/L    Alkaline Phosphatase 59 55 - 142 U/L    Bilirubin, Total 0.4 0.1 - 2.0 mg/dL    Total Protein 6.8 6.4 - 8.2 g/dL    Albumin 3.5 3.4 - 5.0 g/dL    Globulin  3.3 2.8 - 4.4 g/dL    A/G Ratio 1.1 1.0 - 2.0    Patient Fasting for CMP?  Yes      *Note: Due to a large number of results and/or encounters for the requested time period, some results have not been displayed. A complete set of results can be found in Results Review.                  Passed - In person appointment or virtual visit in the past 6 months     Recent Outpatient Visits              2 weeks ago     [address] in [address] [place] [address]    Office Visit    3 weeks ago     [address] in [place], [place]    Office Visit    3 weeks ago     [facility] in [place], [place]    Office Visit    4 weeks ago     [address] in [facility], [address] [place] [address] Visit    1 month ago     [address] [place] [address] in [facility]. [address] or GFRNAA > 50     GFR Evaluation  EGFRCR: 82 , resulted on 11/22/2022                 Recent Outpatient Visits              2 weeks ago     [address] [facility] in [place], [place]    Office Visit    3 weeks ago     [address] [place] [address] in [place], [place]    Office Visit    3 weeks ago     [address] [facility] in [facility] [address]    4 weeks ago     [address] in [facility] [address]    1 month ago     [address] [place] [address] in [place], [place]    Office Visit

## 2023-05-16 NOTE — TELEPHONE ENCOUNTER
Dr. Karla Goode, please see original message. Pt's appt with you on Tuesday was cancelled. She took the day off of work for her [address] and also for an appointment at [place] to get measured.  Pt will reschedule for a physical when she is able to but she really English

## 2023-05-17 RX ORDER — LOSARTAN POTASSIUM 100 MG/1
100 TABLET ORAL DAILY
Qty: 90 TABLET | Refills: 3 | Status: SHIPPED | OUTPATIENT
Start: 2023-05-17

## 2023-07-14 DIAGNOSIS — M17.11 PRIMARY OSTEOARTHRITIS OF RIGHT KNEE: ICD-10-CM

## 2023-07-14 DIAGNOSIS — G89.29 CHRONIC PAIN OF RIGHT KNEE: ICD-10-CM

## 2023-07-14 DIAGNOSIS — M25.462 EFFUSION OF LEFT KNEE: ICD-10-CM

## 2023-07-14 DIAGNOSIS — M25.561 CHRONIC PAIN OF RIGHT KNEE: ICD-10-CM

## 2023-07-14 RX ORDER — MELOXICAM 7.5 MG/1
7.5 TABLET ORAL DAILY
Qty: 30 TABLET | Refills: 1 | Status: SHIPPED | OUTPATIENT
Start: 2023-07-14

## 2023-07-21 ENCOUNTER — TELEPHONE (OUTPATIENT)
Dept: INTERNAL MEDICINE CLINIC | Facility: CLINIC | Age: 68
End: 2023-07-21

## 2023-07-21 DIAGNOSIS — Z00.00 PHYSICAL EXAM: Primary | ICD-10-CM

## 2023-07-21 DIAGNOSIS — I10 ESSENTIAL HYPERTENSION: ICD-10-CM

## 2023-07-21 NOTE — TELEPHONE ENCOUNTER
Pt scheduled a followup w/ Dr Anne Marie Quick on 8-11; if she needs to complete labs ahead, let her know through her mychart.

## 2023-08-11 ENCOUNTER — TELEPHONE (OUTPATIENT)
Dept: ORTHOPEDICS CLINIC | Facility: CLINIC | Age: 68
End: 2023-08-11

## 2023-08-11 DIAGNOSIS — M25.561 RIGHT KNEE PAIN, UNSPECIFIED CHRONICITY: ICD-10-CM

## 2023-08-11 DIAGNOSIS — Z01.89 ENCOUNTER FOR LOWER EXTREMITY COMPARISON IMAGING STUDY: Primary | ICD-10-CM

## 2023-08-11 NOTE — TELEPHONE ENCOUNTER
XR ordered per ortho protocol. XR scheduled and patient was notified via [employer] to let them know that they should arrive 15-20 minutes early, in order for them to complete imaging.

## 2023-08-14 ENCOUNTER — OFFICE VISIT (OUTPATIENT)
Dept: ORTHOPEDICS CLINIC | Facility: CLINIC | Age: 68
End: 2023-08-14
Payer: MEDICARE

## 2023-08-14 ENCOUNTER — LAB ENCOUNTER (OUTPATIENT)
Dept: LAB | Age: 68
End: 2023-08-14
Attending: ORTHOPAEDIC SURGERY
Payer: MEDICARE

## 2023-08-14 ENCOUNTER — HOSPITAL ENCOUNTER (OUTPATIENT)
Dept: GENERAL RADIOLOGY | Age: 68
Discharge: HOME | End: 2023-08-14
Attending: ORTHOPAEDIC SURGERY
Payer: MEDICARE

## 2023-08-14 VITALS — HEIGHT: 63 IN | WEIGHT: 193 LBS | BODY MASS INDEX: 34.2 KG/M2

## 2023-08-14 DIAGNOSIS — M25.561 RIGHT KNEE PAIN, UNSPECIFIED CHRONICITY: ICD-10-CM

## 2023-08-14 DIAGNOSIS — M25.461 EFFUSION OF RIGHT KNEE: ICD-10-CM

## 2023-08-14 DIAGNOSIS — Z00.00 PHYSICAL EXAM: ICD-10-CM

## 2023-08-14 DIAGNOSIS — I10 ESSENTIAL HYPERTENSION: ICD-10-CM

## 2023-08-14 DIAGNOSIS — M17.12 PRIMARY OSTEOARTHRITIS OF LEFT KNEE: ICD-10-CM

## 2023-08-14 DIAGNOSIS — M17.11 PRIMARY OSTEOARTHRITIS OF RIGHT KNEE: Primary | ICD-10-CM

## 2023-08-14 DIAGNOSIS — Z01.89 ENCOUNTER FOR LOWER EXTREMITY COMPARISON IMAGING STUDY: ICD-10-CM

## 2023-08-14 LAB
ALBUMIN SERPL-MCNC: 3.8 G/DL (ref 3.4–5)
ALBUMIN/GLOB SERPL: 1.2 {RATIO} (ref 1–2)
ALP LIVER SERPL-CCNC: 64 U/L
ALT SERPL-CCNC: 27 U/L
ANION GAP SERPL CALC-SCNC: 5 MMOL/L (ref 0–18)
AST SERPL-CCNC: 15 U/L (ref 15–37)
BASOPHILS # BLD AUTO: 0.09 X10(3) UL (ref 0–0.2)
BASOPHILS NFR BLD AUTO: 1.1 %
BILIRUB SERPL-MCNC: 0.3 MG/DL (ref 0.1–2)
BILIRUB UR QL: NEGATIVE
BUN BLD-MCNC: 16 MG/DL (ref 7–18)
BUN/CREAT SERPL: 20 (ref 10–20)
CALCIUM BLD-MCNC: 9.4 MG/DL (ref 8.5–10.1)
CHLORIDE SERPL-SCNC: 108 MMOL/L (ref 98–112)
CHOLEST SERPL-MCNC: 194 MG/DL (ref ?–200)
CLARITY UR: CLEAR
CO2 SERPL-SCNC: 29 MMOL/L (ref 21–32)
CREAT BLD-MCNC: 0.8 MG/DL
DEPRECATED RDW RBC AUTO: 45.7 FL (ref 35.1–46.3)
EGFRCR SERPLBLD CKD-EPI 2021: 79 ML/MIN/1.73M2 (ref 60–?)
EOSINOPHIL # BLD AUTO: 0.09 X10(3) UL (ref 0–0.7)
EOSINOPHIL NFR BLD AUTO: 1.1 %
ERYTHROCYTE [DISTWIDTH] IN BLOOD BY AUTOMATED COUNT: 14.5 % (ref 11–15)
FASTING PATIENT LIPID ANSWER: YES
FASTING STATUS PATIENT QL REPORTED: YES
GLOBULIN PLAS-MCNC: 3.3 G/DL (ref 2.8–4.4)
GLUCOSE BLD-MCNC: 95 MG/DL (ref 70–99)
GLUCOSE UR-MCNC: NORMAL MG/DL
HCT VFR BLD AUTO: 39.5 %
HDLC SERPL-MCNC: 80 MG/DL (ref 40–59)
HGB BLD-MCNC: 13.1 G/DL
HGB UR QL STRIP.AUTO: NEGATIVE
IMM GRANULOCYTES # BLD AUTO: 0.01 X10(3) UL (ref 0–1)
IMM GRANULOCYTES NFR BLD: 0.1 %
KETONES UR-MCNC: NEGATIVE MG/DL
LDLC SERPL CALC-MCNC: 99 MG/DL (ref ?–100)
LEUKOCYTE ESTERASE UR QL STRIP.AUTO: NEGATIVE
LYMPHOCYTES # BLD AUTO: 1.35 X10(3) UL (ref 1–4)
LYMPHOCYTES NFR BLD AUTO: 16.3 %
MCH RBC QN AUTO: 28.7 PG (ref 26–34)
MCHC RBC AUTO-ENTMCNC: 33.2 G/DL (ref 31–37)
MCV RBC AUTO: 86.6 FL
MONOCYTES # BLD AUTO: 0.99 X10(3) UL (ref 0.1–1)
MONOCYTES NFR BLD AUTO: 11.9 %
NEUTROPHILS # BLD AUTO: 5.76 X10 (3) UL (ref 1.5–7.7)
NEUTROPHILS # BLD AUTO: 5.76 X10(3) UL (ref 1.5–7.7)
NEUTROPHILS NFR BLD AUTO: 69.5 %
NITRITE UR QL STRIP.AUTO: NEGATIVE
NONHDLC SERPL-MCNC: 114 MG/DL (ref ?–130)
OSMOLALITY SERPL CALC.SUM OF ELEC: 295 MOSM/KG (ref 275–295)
PH UR: 7.5 [PH] (ref 5–8)
PLATELET # BLD AUTO: 356 10(3)UL (ref 150–450)
POTASSIUM SERPL-SCNC: 4.1 MMOL/L (ref 3.5–5.1)
PROT SERPL-MCNC: 7.1 G/DL (ref 6.4–8.2)
PROT UR-MCNC: NEGATIVE MG/DL
RBC # BLD AUTO: 4.56 X10(6)UL
SODIUM SERPL-SCNC: 142 MMOL/L (ref 136–145)
SP GR UR STRIP: 1.02 (ref 1–1.03)
TRIGL SERPL-MCNC: 86 MG/DL (ref 30–149)
TSI SER-ACNC: 1.3 MIU/ML (ref 0.36–3.74)
UROBILINOGEN UR STRIP-ACNC: NORMAL
VLDLC SERPL CALC-MCNC: 14 MG/DL (ref 0–30)
WBC # BLD AUTO: 8.3 X10(3) UL (ref 4–11)

## 2023-08-14 PROCEDURE — 1160F RVW MEDS BY RX/DR IN RCRD: CPT | Performed by: ORTHOPAEDIC SURGERY

## 2023-08-14 PROCEDURE — 3008F BODY MASS INDEX DOCD: CPT | Performed by: ORTHOPAEDIC SURGERY

## 2023-08-14 PROCEDURE — 80053 COMPREHEN METABOLIC PANEL: CPT

## 2023-08-14 PROCEDURE — 99214 OFFICE O/P EST MOD 30 MIN: CPT | Performed by: ORTHOPAEDIC SURGERY

## 2023-08-14 PROCEDURE — 36415 COLL VENOUS BLD VENIPUNCTURE: CPT

## 2023-08-14 PROCEDURE — 73564 X-RAY EXAM KNEE 4 OR MORE: CPT | Performed by: ORTHOPAEDIC SURGERY

## 2023-08-14 PROCEDURE — 80061 LIPID PANEL: CPT

## 2023-08-14 PROCEDURE — 85025 COMPLETE CBC W/AUTO DIFF WBC: CPT

## 2023-08-14 PROCEDURE — 1159F MED LIST DOCD IN RCRD: CPT | Performed by: ORTHOPAEDIC SURGERY

## 2023-08-14 PROCEDURE — 1125F AMNT PAIN NOTED PAIN PRSNT: CPT | Performed by: ORTHOPAEDIC SURGERY

## 2023-08-14 PROCEDURE — 84443 ASSAY THYROID STIM HORMONE: CPT

## 2023-08-14 PROCEDURE — 20610 DRAIN/INJ JOINT/BURSA W/O US: CPT | Performed by: ORTHOPAEDIC SURGERY

## 2023-08-14 RX ORDER — TRIAMCINOLONE ACETONIDE 40 MG/ML
40 INJECTION, SUSPENSION INTRA-ARTICULAR; INTRAMUSCULAR ONCE
Status: COMPLETED | OUTPATIENT
Start: 2023-08-14 | End: 2023-08-14

## 2023-08-14 RX ADMIN — TRIAMCINOLONE ACETONIDE 40 MG: 40 INJECTION, SUSPENSION INTRA-ARTICULAR; INTRAMUSCULAR at 09:06:00

## 2023-09-08 ENCOUNTER — APPOINTMENT (OUTPATIENT)
Dept: OTHER | Facility: HOSPITAL | Age: 68
End: 2023-09-08
Attending: EMERGENCY MEDICINE

## 2023-09-12 ENCOUNTER — OFFICE VISIT (OUTPATIENT)
Dept: INTERNAL MEDICINE CLINIC | Facility: CLINIC | Age: 68
End: 2023-09-12

## 2023-09-12 VITALS
WEIGHT: 193.31 LBS | HEIGHT: 63 IN | HEART RATE: 73 BPM | SYSTOLIC BLOOD PRESSURE: 133 MMHG | BODY MASS INDEX: 34.25 KG/M2 | DIASTOLIC BLOOD PRESSURE: 81 MMHG

## 2023-09-12 DIAGNOSIS — K63.5 POLYP OF COLON, UNSPECIFIED PART OF COLON, UNSPECIFIED TYPE: ICD-10-CM

## 2023-09-12 DIAGNOSIS — Z87.891 HX OF SMOKING: ICD-10-CM

## 2023-09-12 DIAGNOSIS — Z85.820 HX OF MALIGNANT MELANOMA: ICD-10-CM

## 2023-09-12 DIAGNOSIS — Z87.19 HX OF GASTROESOPHAGEAL REFLUX (GERD): ICD-10-CM

## 2023-09-12 DIAGNOSIS — I89.0 LYMPHEDEMA OF LEFT LEG: ICD-10-CM

## 2023-09-12 DIAGNOSIS — Z90.81 HX OF SPLENECTOMY: ICD-10-CM

## 2023-09-12 DIAGNOSIS — Z12.31 SCREENING MAMMOGRAM, ENCOUNTER FOR: ICD-10-CM

## 2023-09-12 DIAGNOSIS — Z98.890 S/P ARTHROSCOPIC SURGERY OF RIGHT KNEE: ICD-10-CM

## 2023-09-12 DIAGNOSIS — I10 PRIMARY HYPERTENSION: ICD-10-CM

## 2023-09-12 DIAGNOSIS — Z78.0 POSTMENOPAUSAL: ICD-10-CM

## 2023-09-12 DIAGNOSIS — Z00.00 MEDICARE ANNUAL WELLNESS VISIT, SUBSEQUENT: Primary | ICD-10-CM

## 2023-09-12 DIAGNOSIS — D22.9 NUMEROUS SKIN MOLES: ICD-10-CM

## 2023-09-12 PROCEDURE — 1125F AMNT PAIN NOTED PAIN PRSNT: CPT | Performed by: INTERNAL MEDICINE

## 2023-09-12 PROCEDURE — 1160F RVW MEDS BY RX/DR IN RCRD: CPT | Performed by: INTERNAL MEDICINE

## 2023-09-12 PROCEDURE — 96160 PT-FOCUSED HLTH RISK ASSMT: CPT | Performed by: INTERNAL MEDICINE

## 2023-09-12 PROCEDURE — 99213 OFFICE O/P EST LOW 20 MIN: CPT | Performed by: INTERNAL MEDICINE

## 2023-09-12 PROCEDURE — 1159F MED LIST DOCD IN RCRD: CPT | Performed by: INTERNAL MEDICINE

## 2023-09-12 PROCEDURE — 3008F BODY MASS INDEX DOCD: CPT | Performed by: INTERNAL MEDICINE

## 2023-09-12 PROCEDURE — 1170F FXNL STATUS ASSESSED: CPT | Performed by: INTERNAL MEDICINE

## 2023-09-12 PROCEDURE — G0439 PPPS, SUBSEQ VISIT: HCPCS | Performed by: INTERNAL MEDICINE

## 2023-09-12 PROCEDURE — 3075F SYST BP GE 130 - 139MM HG: CPT | Performed by: INTERNAL MEDICINE

## 2023-09-12 PROCEDURE — 3079F DIAST BP 80-89 MM HG: CPT | Performed by: INTERNAL MEDICINE

## 2023-09-12 RX ORDER — BUPROPION HYDROCHLORIDE 150 MG/1
150 TABLET, EXTENDED RELEASE ORAL 2 TIMES DAILY
Qty: 60 TABLET | Refills: 1 | Status: SHIPPED | OUTPATIENT
Start: 2023-09-12

## 2023-09-12 RX ORDER — FAMOTIDINE 20 MG/1
20 TABLET, FILM COATED ORAL 2 TIMES DAILY
Qty: 60 TABLET | Refills: 3 | Status: SHIPPED | OUTPATIENT
Start: 2023-09-12

## 2023-09-24 DIAGNOSIS — G89.29 CHRONIC PAIN OF RIGHT KNEE: ICD-10-CM

## 2023-09-24 DIAGNOSIS — M25.462 EFFUSION OF LEFT KNEE: ICD-10-CM

## 2023-09-24 DIAGNOSIS — M17.11 PRIMARY OSTEOARTHRITIS OF RIGHT KNEE: ICD-10-CM

## 2023-09-24 DIAGNOSIS — M25.561 CHRONIC PAIN OF RIGHT KNEE: ICD-10-CM

## 2023-09-25 RX ORDER — MELOXICAM 7.5 MG/1
7.5 TABLET ORAL DAILY
Qty: 30 TABLET | Refills: 1 | OUTPATIENT
Start: 2023-09-25

## 2023-10-11 NOTE — TELEPHONE ENCOUNTER
buPROPion  MG Oral Tablet 12 Hr, Take 1 tablet (150 mg total) by mouth 2 (two) times daily., Disp: 60 tablet, Rfl: 1    90 day prescription request

## 2023-10-12 RX ORDER — BUPROPION HYDROCHLORIDE 150 MG/1
150 TABLET, EXTENDED RELEASE ORAL 2 TIMES DAILY
Qty: 180 TABLET | Refills: 3 | Status: SHIPPED | OUTPATIENT
Start: 2023-10-12

## 2023-10-12 NOTE — TELEPHONE ENCOUNTER
Refill passed per [facility] protocol.   Requested Prescriptions   Pending Prescriptions Disp Refills    buPROPion  MG Oral Tablet 12 Hr 180 tablet 0     Sig: Take 1 tablet (150 mg total) by mouth 2 (two) times daily.       Psychiatric Non-Scheduled (Anti-Anxiety) Passed - 10/11/2023 11:37 AM        Passed - In person appointment or virtual visit in the past 6 mos or appointment in next 3 mos     Recent Outpatient Visits              1 month ago Medicare annual wellness visit, subsequent    [facility], [address], [place] Anabella Olivarez MD    Office Visit    1 month ago Primary osteoarthritis of right knee    [facility], [address], [place] Montse Bedoya MD    Office Visit    5 months ago     [facility] in [place] Sorensen-Brittich, Abigail, PT    Office Visit    5 months ago     [facility] in [place] Sorensen-Brittich, Abigail, PT    Office Visit    5 months ago     [facility] in [place] Sorensen-Brittich, Abigail, PT    Office Visit          Future Appointments         Provider Department Appt Notes    In 1 month LMB SEFERINO RM1; [facility] DEXA RM1 [facility] - [place] requested by dr    In 1 month LMB DEXA RM1; [facility] SEFERINO RM1 [facility] - [place]                           Recent Outpatient Visits              1 month ago Medicare annual wellness visit, subsequent    [facility], [address], [place] Anabella Olivarez MD    Office Visit    1 month ago Primary osteoarthritis of right knee    [facility] [place]    Office Visit    5 months ago     [facility] in [place] Sorensen-Brittich, Irene, PT    Office Visit    5 months ago     [facility] in [place] Sorensen-Brittich, Abigail, PT    Office Visit    5 months ago     [facility] in [place] Sorensen-Brittich, Abigail, PT    Office Visit            Future Appointments         Provider Department Appt Notes    In 1 month LMB SEFERINO RM1; LMB DEXA RM1 [facility] DEX[facility] - [place] requested by     In 1 month XAVI GONZALEZ RM1; LMB SEFERINO RM1 [facility] - [place]

## 2023-10-24 ENCOUNTER — TELEPHONE (OUTPATIENT)
Facility: CLINIC | Age: 68
End: 2023-10-24

## 2023-10-24 DIAGNOSIS — Z86.010 HX OF COLONIC POLYP: ICD-10-CM

## 2023-10-24 DIAGNOSIS — Z12.11 COLON CANCER SCREENING: Primary | ICD-10-CM

## 2023-10-24 NOTE — TELEPHONE ENCOUNTER
Patient calling back to schedule her 5 year colonoscopy recall. Last Procedure, Date, MD:  colonoscopy 03/12/2018  Last Diagnosis:  - colon polyps x 2  - diverticulosis  - internal hemorrhoids  Recalled (mth/yrs): 5 years  Sedation Used Previously:  MAC  Last Prep Used (if known):colyte    Quality Of Prep (if known): good  Anticoagulants:no  Diuretics: no  Diabetic Med's (PO/Injectables): no  Weight loss Med's:no  Iron/Herbal/Multivitamin Supplements (RX/OTC):no  Marijuana/Vaping/CBD:uses marijuana monthly  Height & Weight:5'3/187  BMI:33.1  Hx of Cardiac/CVA Issues/(MI/Stroke):no  Devices Pacemaker/Defibrillator/Stents:no  Respiratory Issues/Oxygen Use/LESIA/COPD:no  Issues w/ Anesthesia:no    Symptoms (Y/N): none  Symptoms Details:     Special Comments/Notes:    Please advise on orders and prep. Thank you! Operative Report signed by Aaron Diaz MD at 3/12/2018 10:07 AM  Version 1 of 1  Author: Aaron Diaz MD Service: Gastroenterology Author Type: Physician   Filed: 3/12/2018 10:07 AM Date of Service: 3/12/2018  9:43 AM Status: Signed   : Aaron Diaz MD (Physician)   [facility] Endoscopy Report        Preoperative Diagnosis:  - history of colon polyps         Postoperative Diagnosis:  - colon polyps x 2  - diverticulosis  - internal hemorrhoids        Procedure:    Colonoscopy         Surgeon:  Ryan Colvin M.D. Anesthesia:  MAC sedation, see anesthesia records     Technique:  After informed consent, the patient was placed in the left lateral recumbent position. Digital rectal examination revealed no palpable intraluminal abnormalities. An Olympus variable stiffness 190 series HD colonoscope was inserted into the rectum and advanced under direct vision by following the lumen to the cecum. The colon was examined upon withdrawal in the left lateral position. The procedure was well tolerated without immediate complication. Findings:   The preparation of the colon was good. The terminal ileum was examined for 4 cm and visually normal.  The ileocecal valve was well preserved. The visualized colonic mucosa from the cecum to the anal verge was normal with an intact vascular pattern. Colon polyps x 2 removed as follows;  - ascending x1, sessile 4 mm cold snare removed  - transverse x1, sessile 2 -3 mm cold forceps removed  Both sites free of bleeding and specimens sent to pathology. Diverticular disease noted in the sigmoid colon, no diverticulitis. Small internal hemorrhoids. Impression:  - colon polyps x 2  - diverticulosis  - internal hemorrhoids        Recommendations:  - Post polypectomy instructions given  - Repeat colonoscopy in 5 years  - High fiber diet for diverticular disease  - Symptomatic treatment of hemorrhoids              [facility] [place]. Keshia Leigh MD  3/12/2018  9:43 AM        Final Diagnosis:      A. Ascending colon polyp:   Fragments of tubular adenoma. B. Transverse colon polyp:   Fragments of tubular adenoma.

## 2023-10-27 NOTE — TELEPHONE ENCOUNTER
Scheduled for:  Colonoscopy [id number]  Provider Name:  Dr. Erinn Dhaliwal  Date:  05/10/2024  Location:[facility]  Sedation:  MAC  Time: 1:30pm (Patient is aware arrival time is at 12:30pm)  Prep:  Trilyte Prep Instructions Given At via [facility]  Meds/Allergies Reconciled?:  Physician Reviewed   Diagnosis with codes:  Colon Screening Z12.11/ Hx of colon polyps Z86.010  Was patient informed to call insurance with codes (Y/N):  Yes  Referral sent?:  Referral was sent at the time of electronic surgical scheduling. [address] [place] [address] or [facility] notified?:  I sent an electronic request to Endo Scheduling and received a confirmation today. Medication Orders:  Pt is aware to NOT take iron pills, herbal meds and diet supplements for 7 days before exam. Also to NOT take any form of alcohol, recreational drugs and any forms of ED meds 24 hours before exam.   Misc Orders:       Further instructions given by staff:  I provide prep instructions to patient at the time of the appointment and reviewed date, time and location, she verbalized that she understood and is aware to call if she has any questions. Patient was informed about the new cancellation policy for his/her procedure. Patient was also given a copy of the cancellation policy at the time of the appointment and verbalized understanding.

## 2023-11-27 ENCOUNTER — HOSPITAL ENCOUNTER (OUTPATIENT)
Dept: BONE DENSITY | Age: 68
Discharge: HOME | End: 2023-11-27
Attending: INTERNAL MEDICINE
Payer: MEDICARE

## 2023-11-27 DIAGNOSIS — Z78.0 POSTMENOPAUSAL: ICD-10-CM

## 2023-11-27 PROCEDURE — 77080 DXA BONE DENSITY AXIAL: CPT | Performed by: INTERNAL MEDICINE

## 2023-12-15 RX ORDER — FAMOTIDINE 20 MG/1
20 TABLET, FILM COATED ORAL 2 TIMES DAILY
Qty: 180 TABLET | Refills: 3 | Status: SHIPPED | OUTPATIENT
Start: 2023-12-15 | End: 2024-02-12

## 2023-12-15 NOTE — TELEPHONE ENCOUNTER
Refill passed per [facility] protocol.     Requested Prescriptions   Pending Prescriptions Disp Refills    FAMOTIDINE 20 MG Oral Tab [Pharmacy Med Name: FAMOTIDINE 20 MG TABLET] 180 tablet 1     Sig: Take 1 tablet (20 mg total) by mouth 2 (two) times daily. 30 minutes  before breakfast and diner       Gastrointestional Medication Protocol Passed - 12/14/2023 12:44 AM        Passed - In person appointment or virtual visit in the past 12 mos or appointment in next 3 mos     Recent Outpatient Visits              3 months ago Medicare annual wellness visit, subsequent    [facility], [address], [place] Anabella Olivarez MD    Office Visit    4 months ago Primary osteoarthritis of right knee    [facility], [address], [place] Montse Bedoya MD    Office Visit    7 months ago     [facility] in [place] Sorensen-Brittich, Abigail, PT    Office Visit    7 months ago     [facility] in [place] Sorensen-Brittich, Abigail, PT    Office Visit    7 months ago     [facility] in [place] Sorensen-Brittich, Abigail, PT    Office Visit          Future Appointments         Provider Department Appt Notes    In 4 months [facility] Colon SCRN @ [facility]                    @[facility]      @[facility]

## 2024-02-12 ENCOUNTER — OFFICE VISIT (OUTPATIENT)
Dept: INTERNAL MEDICINE CLINIC | Facility: CLINIC | Age: 69
End: 2024-02-12
Payer: MEDICARE

## 2024-02-12 ENCOUNTER — HOSPITAL ENCOUNTER (OUTPATIENT)
Dept: GENERAL RADIOLOGY | Age: 69
Discharge: HOME | End: 2024-02-12
Attending: INTERNAL MEDICINE
Payer: MEDICARE

## 2024-02-12 VITALS
DIASTOLIC BLOOD PRESSURE: 81 MMHG | BODY MASS INDEX: 32.6 KG/M2 | WEIGHT: 184 LBS | HEIGHT: 63 IN | HEART RATE: 99 BPM | SYSTOLIC BLOOD PRESSURE: 135 MMHG

## 2024-02-12 DIAGNOSIS — M54.50 ACUTE RIGHT-SIDED LOW BACK PAIN WITHOUT SCIATICA: ICD-10-CM

## 2024-02-12 DIAGNOSIS — M54.50 ACUTE RIGHT-SIDED LOW BACK PAIN WITHOUT SCIATICA: Primary | ICD-10-CM

## 2024-02-12 DIAGNOSIS — I10 PRIMARY HYPERTENSION: ICD-10-CM

## 2024-02-12 PROCEDURE — 99214 OFFICE O/P EST MOD 30 MIN: CPT | Performed by: INTERNAL MEDICINE

## 2024-02-12 PROCEDURE — G2211 COMPLEX E/M VISIT ADD ON: HCPCS | Performed by: INTERNAL MEDICINE

## 2024-02-12 PROCEDURE — 72100 X-RAY EXAM L-S SPINE 2/3 VWS: CPT | Performed by: INTERNAL MEDICINE

## 2024-02-12 RX ORDER — METHYLPREDNISOLONE 4 MG/1
TABLET ORAL
Qty: 1 EACH | Refills: 0 | Status: SHIPPED | OUTPATIENT
Start: 2024-02-12

## 2024-02-12 RX ORDER — CYCLOBENZAPRINE HCL 5 MG
5 TABLET ORAL NIGHTLY PRN
Qty: 20 TABLET | Refills: 0 | Status: SHIPPED | OUTPATIENT
Start: 2024-02-12

## 2024-02-12 NOTE — PROGRESS NOTES
HPI:    Patient ID: Caitlyn Anguiano is a 68 year old female.  Chief Complaint   Patient presents with    Back Pain     C/o right sided back pain for about a week.  No recent injuries     Patient in office today for   ,HTN a  Had a headache for short.  Of time states she took Tylenol lay down and the headache was gone.  Patient still states that this is  Patient states feeling well otherwise. Denies chest pain, shortnesss of breath, palpitations, or abdominal pain, denies UTI symptoms fever or chills.  Hypertension  This is a chronic problem. The current episode started more than 1 month ago. The problem has been gradually improving since onset. The problem is controlled. Pertinent negatives include no chest pain, headaches, palpitations, peripheral edema or shortness of breath. Risk factors for coronary artery disease include obesity and post-menopausal state. Past treatments include lifestyle changes and angiotensin blockers. The current treatment provides significant improvement.   Back Pain  This is a chronic problem. The problem has been gradually worsening since onset. The pain is present in the lumbar spine. The quality of the pain is described as stabbing and aching. The pain does not radiate. Exacerbated by: walking. Pertinent negatives include no abdominal pain, bladder incontinence, bowel incontinence, chest pain, dysuria, fever, headaches, numbness, paresis, perianal numbness or weakness. Risk factors include obesity. She has tried bed rest, heat and ice for the symptoms. The treatment provided moderate relief.       Review of Systems   Constitutional:  Negative for chills, fatigue and fever.   HENT:  Negative for ear pain and sore throat.    Eyes:  Negative for pain and redness.   Respiratory:  Negative for cough, shortness of breath and wheezing.    Cardiovascular:  Negative for chest pain, palpitations and leg swelling.   Gastrointestinal:  Negative for abdominal pain, bowel incontinence,  constipation, diarrhea, nausea and vomiting.             Genitourinary:  Negative for bladder incontinence, dysuria and frequency.   Musculoskeletal:  Positive for back pain (lower - no hx of trauma).        R knee - DJD    Skin:  Negative for pallor.   Neurological:  Negative for dizziness, weakness, numbness and headaches.   Psychiatric/Behavioral:  Negative for confusion. The patient is not nervous/anxious.             Current Outpatient Medications   Medication Sig Dispense Refill    cyclobenzaprine 5 MG Oral Tab Take 1 tablet (5 mg total) by mouth nightly as needed for Muscle spasms. 20 tablet 0    methylPREDNISolone (MEDROL) 4 MG Oral Tablet Therapy Pack As directed. 1 each 0    buPROPion  MG Oral Tablet 12 Hr Take 1 tablet (150 mg total) by mouth 2 (two) times daily. 180 tablet 3    losartan 100 MG Oral Tab Take 1 tablet (100 mg total) by mouth daily. 90 tablet 3     Allergies:No Known Allergies   PHYSICAL EXAM:   Physical Exam  Vitals and nursing note reviewed.   Constitutional:       General: She is not in acute distress.     Appearance: She is well-developed.      Comments: Obese    HENT:      Head: Normocephalic and atraumatic.      Nose:      Right Sinus: No maxillary sinus tenderness or frontal sinus tenderness.      Left Sinus: No maxillary sinus tenderness or frontal sinus tenderness.      Mouth/Throat:      Pharynx: Uvula midline.   Eyes:      General: No scleral icterus.  Neck:      Thyroid: No thyromegaly.      Vascular: No JVD.   Cardiovascular:      Rate and Rhythm: Normal rate and regular rhythm.      Heart sounds: Normal heart sounds. No murmur heard.  Pulmonary:      Effort: Pulmonary effort is normal. No respiratory distress.      Breath sounds: Normal breath sounds. No wheezing or rales.   Abdominal:      Palpations: Abdomen is soft. There is no mass.      Tenderness: There is no abdominal tenderness.   Musculoskeletal:      Cervical back: Neck supple.      Lumbar back: Spasms present.  No tenderness or bony tenderness. Decreased range of motion. Negative right straight leg raise test and negative left straight leg raise test.        Back:       Right lower leg: No edema.      Left lower leg: No edema.   Lymphadenopathy:      Cervical: No cervical adenopathy.   Skin:     General: Skin is warm and dry.   Neurological:      General: No focal deficit present.      Mental Status: She is alert and oriented to person, place, and time.   Psychiatric:         Behavior: Behavior normal.           Blood pressure 135/81, pulse 99, height 5' 3\" (1.6 m), weight 184 lb (83.5 kg), not currently breastfeeding.           ASSESSMENT/PLAN:   1. Acute right-sided low back pain without sciatica  Patient to avoid lifting patient to complete the x-ray of the lumbar spine  X-ray of the lumbar spine to be completed Medrol Dosepak after the x-ray  Patient had in the past side effects directions discussed with patient-    Muscle relaxant Flexeril 5 mg at night refer to Dr. Gonzalez if pain is persistent or worsening.-      - XR LUMBAR SPINE (MIN 2 VIEWS) (CPT=72100); Future     Essential hypertension  Take high blood pressure medication as perscribed   Low- sodium diet   Maintain walking - as tolerated   Track and record blood pressure and heart rate at home   The side effects of medication discussed with patient   Patient verbalizes understanding and compliance  Stable patient to continue present medications  Losartan 100 mg daily  Labs discussed with patient  Advised patient to avoid taking any over-the-counter cold and sinus medication due to the fact that she has hypertension and and can elevate the blood pressure can use only Allegra plain or Claritin plain  Stable continue present management          Hx  Splenectomy   Pt  educaiton   immunisation discussed  Meningitis shot   Pt  Refused shots now   We will discuss next visit time    F/u in 2 Months       No orders of the defined types were placed in this  encounter.      Meds This Visit:  Requested Prescriptions     Signed Prescriptions Disp Refills    cyclobenzaprine 5 MG Oral Tab 20 tablet 0     Sig: Take 1 tablet (5 mg total) by mouth nightly as needed for Muscle spasms.    methylPREDNISolone (MEDROL) 4 MG Oral Tablet Therapy Pack 1 each 0     Sig: As directed.       Imaging & Referrals:  PHYSIATRY - INTERNAL  XR LUMBAR SPINE (MIN 2 VIEWS) (CPT=72100)       ID#[id number]

## 2024-02-29 ENCOUNTER — HOSPITAL ENCOUNTER (OUTPATIENT)
Dept: GENERAL RADIOLOGY | Age: 69
Discharge: HOME | End: 2024-02-29
Attending: PHYSICAL MEDICINE & REHABILITATION
Payer: MEDICARE

## 2024-02-29 ENCOUNTER — OFFICE VISIT (OUTPATIENT)
Dept: PHYSICAL MEDICINE AND REHAB | Facility: CLINIC | Age: 69
End: 2024-02-29
Payer: MEDICARE

## 2024-02-29 VITALS — BODY MASS INDEX: 32.6 KG/M2 | WEIGHT: 184 LBS | HEIGHT: 63 IN

## 2024-02-29 DIAGNOSIS — M47.816 LUMBAR FACET ARTHROPATHY: ICD-10-CM

## 2024-02-29 DIAGNOSIS — M43.16 ANTEROLISTHESIS OF LUMBAR SPINE: ICD-10-CM

## 2024-02-29 DIAGNOSIS — M54.9 CHRONIC MID BACK PAIN: ICD-10-CM

## 2024-02-29 DIAGNOSIS — G89.29 CHRONIC MID BACK PAIN: Primary | ICD-10-CM

## 2024-02-29 DIAGNOSIS — G89.29 CHRONIC MID BACK PAIN: ICD-10-CM

## 2024-02-29 DIAGNOSIS — M54.9 CHRONIC MID BACK PAIN: Primary | ICD-10-CM

## 2024-02-29 PROCEDURE — 72072 X-RAY EXAM THORAC SPINE 3VWS: CPT | Performed by: PHYSICAL MEDICINE & REHABILITATION

## 2024-02-29 PROCEDURE — 99204 OFFICE O/P NEW MOD 45 MIN: CPT | Performed by: PHYSICAL MEDICINE & REHABILITATION

## 2024-02-29 NOTE — PATIENT INSTRUCTIONS
-Xrays of the thoracic spine today  -Tylenol and lidocaine patch as needed  -Start PT and home exercises  -Follow up in 4 weeks

## 2024-02-29 NOTE — PROGRESS NOTES
[facility]  NEW PATIENT EVALUATION    Consultation as a request of Dr. Olivarez      HISTORY OF PRESENT ILLNESS:     Chief Complaint   Patient presents with    Low Back Pain     New left handed patient comes in for bilateral low back pain that radiates to mid back pain with spasms. Pain started a few weeks ago after stretching. XR Lumbar completed. No PT/Injections. Rates pain 2/10.       The patient is a 68 year old female with significant past medical history of melanoma, hypertension, lymphedema, osteoarthritis, STEPHON V who presents with right sided mid and low back pain. She was stretching to reach for something when she had an exacerbation of her pain. She has had intermittent mid back spasms. They usually last a few seconds and resolve. Pain is aching at this point 2/10. She has tried muscle relaxer for a few nights.  She has aching discomfort across the lower back right side worse than left.  She denies any radiating symptoms in the legs, any numbness tingling or weakness.  She denies any saddle anesthesia.  She has had x-ray imaging of the lumbar spine as noted below.  She likes to stay active and walks 5000 steps daily.  She was able to walk up to 5 miles before but is noted some functional decline over the last several months.  She also has right knee pain and bilateral shoulder pain which she sees orthopedic surgery.     PHYSICAL EXAM:   Ht 63\"   Wt 184 lb (83.5 kg)   BMI 32.59 kg/m²     Gait  Able to toe walk and heel walk without any difficulty    LUMBAR SPINE:  Inspection: no erythema, swelling, or obvious deformity.  Their iliac crest and shoulder heights are symmetrical.     Palpation: Non tender to palpation of the spinous process.  Tenderness to palpation of the thoracic and lumbar paraspinals bilateral  ROM: FAROM but pain with extension  Strength: 5/5 in bilateral lower extremities  Sensation: Intact to light touch in all dermatomes of the lower  extremities  Reflexes: 2/4 at L4 and S1  Facet Loading: Positive bilateral lower lumbar facet joint  Slump test: negative for pain symptoms for radicular pain symptoms      IMAGING:     X-ray lumbar spine completed on 2/12/2024 is notable for degenerative disc disease at multiple lumbar segments.  There is grade 2 anterolisthesis of L5 on S1 as well as minimal anterolisthesis of L4 and L5.    All imaging results were reviewed and discussed with patient.      ASSESSMENT/PLAN:     1. Chronic mid back pain    2. Anterolisthesis of lumbar spine    3. Lumbar facet arthropathy        Caitlyn Anguiano is a pleasant 68-year-old female presenting today for evaluation of chronic mid back and low back pain with muscle spasms.  She has grade 2 anterolisthesis of L5 on S1 as noted on x-ray imaging.  She has facet arthropathy in the lower lumbar segments.  I recommended x-ray imaging of the thoracic spine today and also recommend starting PT program with home exercises.  Overall her pain is well-controlled and recommend she use over-the-counter Tylenol and topical lidocaine patch as needed.  She will follow-up with me in 4 weeks if the pain is still present we will obtain MRI imaging for further evaluation.  We had a brief discussion about possible interventional facet joint injection in the near future.      The patient verbalized understanding with the plan and was in agreement. All questions/concerns were addressed and there were no barriers to learning.  Please note Dragon dictation software was used to dictate this note and may result in inadvertent typos.    Marguerite Gonzalez DO, FAAPMR & CAQSM  Physical Medicine and Rehabilitation  Sports and Spine Medicine    PAST MEDICAL HISTORY:     Past Medical History:   Diagnosis Date    Cancer (HCC) 2012    melanoma    Colon polyps     Essential hypertension     High blood pressure     Lymph edema 2009    left foot/leg    Osteoarthritis     OTHER DISEASES     personal hx of melanoma to  left foot-big toe    Personal history of antineoplastic chemotherapy     PONV (postoperative nausea and vomiting)          PAST SURGICAL HISTORY:     Past Surgical History:   Procedure Laterality Date    COLONOSCOPY      COLONOSCOPY N/A 3/12/2018    Procedure: COLONOSCOPY;  Surgeon: Marvel Welch MD;  Location: [facility] ENDOSCOPY    COLONOSCOPY & POLYPECTOMY      at [facility]    LIP SURGERY PROC UNLISTED Right     Basal cell removal on right side of upper lip    OTHER Left     Left great toe removed/ chemotherapy    SPLENECTOMY      TONSILLECTOMY           CURRENT MEDICATIONS:     Current Outpatient Medications   Medication Sig Dispense Refill    buPROPion  MG Oral Tablet 12 Hr Take 1 tablet (150 mg total) by mouth 2 (two) times daily. 180 tablet 3    losartan 100 MG Oral Tab Take 1 tablet (100 mg total) by mouth daily. 90 tablet 3    cyclobenzaprine 5 MG Oral Tab Take 1 tablet (5 mg total) by mouth nightly as needed for Muscle spasms. (Patient not taking: Reported on 2024) 20 tablet 0    methylPREDNISolone (MEDROL) 4 MG Oral Tablet Therapy Pack As directed. (Patient not taking: Reported on 2024) 1 each 0         ALLERGIES:   No Known Allergies      FAMILY HISTORY:     Family History   Problem Relation Age of Onset    Heart Disease Mother         CAD    Diabetes Father     Heart Attack Father         myocardial infarction x2    Heart Disease Brother 64    Diabetes Brother 68    Heart Disease Brother     Colon Cancer Brother 64          SOCIAL HISTORY:     Social History     Socioeconomic History    Marital status:    Tobacco Use    Smoking status: Former     Packs/day: 0.00     Years: 50.00     Additional pack years: 0.00     Total pack years: 0.00     Types: Cigarettes     Quit date: 2020     Years since quittin.0    Smokeless tobacco: Never    Tobacco comments:     Pt state that she is under a lot of stress. Pt state that she has cut down a lot   Vaping Use     Vaping Use: Never used   Substance and Sexual Activity    Alcohol use: No    Drug use: Yes     Types: Cannabis     Comment: Monthly - once    Sexual activity: Yes     Partners: Female   Other Topics Concern    Caffeine Concern Yes     Comment: coffee, 3 cups daily    Reaction to local anesthetic No          REVIEW OF SYSTEMS:   No patient-reported data collected this visit.      PHYSICAL EXAM:   General: No immediate distress  Head: Normocephalic/ Atraumatic  Eyes: Extra-occular movements intact.   Ears: No auricular hematoma or deformities  Mouth: No lesions or ulcerations  Heart: peripheral pulses intact. Normal capillary refill.   Lungs: Non-labored respirations  Abdomen: No abdominal guarding  Extremities: No lower extremity edema bilaterally   Skin: No lesions noted   Cognition: alert & oriented x 3, attentive, able to follow 2 step commands, comprehention intact, spontaneous speech intact  Psychiatric: Mood and affect appropriate      LABS:   No results found for: \"EAG\", \"A1C\"  Lab Results   Component Value Date    WBC 8.3 08/14/2023    RBC 4.56 08/14/2023    HGB 13.1 08/14/2023    HCT 39.5 08/14/2023    MCV 86.6 08/14/2023    MCH 28.7 08/14/2023    MCHC 33.2 08/14/2023    RDW 14.5 08/14/2023    .0 08/14/2023    MPV 9.4 02/24/2018     Lab Results   Component Value Date    GLU 95 08/14/2023    BUN 16 08/14/2023    BUNCREA 20.0 08/14/2023    CREATSERUM 0.80 08/14/2023    ANIONGAP 5 08/14/2023    GFRNAA 63 06/16/2021    GFRAA 72 06/16/2021    CA 9.4 08/14/2023    OSMOCALC 295 08/14/2023    ALKPHO 64 08/14/2023    AST 15 08/14/2023    ALT 27 08/14/2023    ALKPHOS 52 04/19/2016    BILT 0.3 08/14/2023    TP 7.1 08/14/2023    ALB 3.8 08/14/2023    GLOBULIN 3.3 08/14/2023    AGRATIO 1.3 04/19/2016     08/14/2023    K 4.1 08/14/2023     08/14/2023    CO2 29.0 08/14/2023     No results found for: \"PTP\", \"PT\", \"INR\"  Lab Results   Component Value Date    OQNA71LH 18.7 12/06/2012

## 2024-03-06 ENCOUNTER — TELEPHONE (OUTPATIENT)
Dept: PHYSICAL THERAPY | Facility: HOSPITAL | Age: 69
End: 2024-03-06

## 2024-03-07 ENCOUNTER — HOSPITAL ENCOUNTER (OUTPATIENT)
Dept: MAMMOGRAPHY | Age: 69
Discharge: HOME | End: 2024-03-07
Attending: INTERNAL MEDICINE
Payer: MEDICARE

## 2024-03-07 DIAGNOSIS — Z12.31 SCREENING MAMMOGRAM, ENCOUNTER FOR: ICD-10-CM

## 2024-03-07 PROCEDURE — 77067 SCR MAMMO BI INCL CAD: CPT | Performed by: INTERNAL MEDICINE

## 2024-03-07 PROCEDURE — 77063 BREAST TOMOSYNTHESIS BI: CPT | Performed by: INTERNAL MEDICINE

## 2024-03-08 ENCOUNTER — OFFICE VISIT (OUTPATIENT)
Dept: PHYSICAL THERAPY | Age: 69
End: 2024-03-08
Attending: PHYSICAL MEDICINE & REHABILITATION
Payer: MEDICARE

## 2024-03-08 DIAGNOSIS — G89.29 CHRONIC MID BACK PAIN: Primary | ICD-10-CM

## 2024-03-08 DIAGNOSIS — M54.9 CHRONIC MID BACK PAIN: Primary | ICD-10-CM

## 2024-03-08 DIAGNOSIS — M43.16 ANTEROLISTHESIS OF LUMBAR SPINE: ICD-10-CM

## 2024-03-08 DIAGNOSIS — M47.816 LUMBAR FACET ARTHROPATHY: ICD-10-CM

## 2024-03-08 PROCEDURE — 97110 THERAPEUTIC EXERCISES: CPT

## 2024-03-08 PROCEDURE — 97162 PT EVAL MOD COMPLEX 30 MIN: CPT

## 2024-03-08 NOTE — PROGRESS NOTES
SPINE EVALUATION:     Diagnosis:    Chronic mid back pain (M54.9,G89.29)  Anterolisthesis of lumbar spine (M43.16)  Lumbar facet arthropathy (M47.816)     Referring Provider: Carlos  Date of Evaluation:    3/8/2024    Precautions:  None Next MD visit:   none scheduled  Date of Surgery: n/a     PATIENT SUMMARY   Caitlyn Anguiano is a 68 year old female who presents to therapy today with complaints of low back pain for years but pain has been worse in the mid-back for the past month.  The pain includes muscle spasms especially when she stands up. She has had 2 episodes in the past month of spasms which are severe for 3-4 days. She has always been very physical.  She recently had issues with her right knee due to arthritis. She used to walk 5 miles/day over 1 year ago.  Now, her knee is stopping her from being more active and she believes this is causing her more back pain.   Pt describes pain level current 6/10, at best 2/10, at worst 9/10.   Current functional limitations include Standing in one place (tolerates <10 minutes), reaching overhead, twisting    Caitlyn describes prior level of function Works as  and needs to twist and reach to floor a lot. Prior to 1 year ago, worked out regularly and walked 5 miles/day.   Pt goals include Learn how to get strong again and learn appropriate exercises that she can do for work outs.  Past medical history was reviewed with Caitlyn. Significant findings include Right knee OA, Lymphedema left LE following melanoma about 20 years ago  Pt denies diplopia, dysarthria, dysphasia, dizziness, drop attacks, bowel/bladder changes, saddle anesthesia, and SAGE LE N/T.    ASSESSMENT  Caitlyn presents to physical therapy evaluation with primary c/o low back and mid back pain. The results of the objective tests and measures show postural deficits, muscular adhesions, left pelvic upslip and hip weakness bilat.  Functional deficits include but are not limited to  standing, twisting, reaching overhead.  Signs and symptoms are consistent with diagnosis of mid back pain, anterolisthesis of lumbar spine and lumbar facet arthropathy . Pt and PT discussed evaluation findings, pathology, POC and HEP.  Pt voiced understanding and performs HEP correctly without reported pain. Skilled Physical Therapy is medically necessary to address the above impairments and reach functional goals.     OBJECTIVE:   Observation/Posture: Bilat forward shoulders, forward head, increased thoracic kyphosis    Lumbar AROM: (* denotes performed with pain)  Flexion: WNL*  Extension: max decrease*  Sidebending: R mild decr*; L mild decr*  Rotation: R WNL*; L WNL  Cervical AROM:  Rotation R 45*; L 65  Side bending R 25*; L 25*    Palpation: Tender at L4-S1 Spinous processes, right glut med, left QL, thoracic paraspinals, PSIS (L>R)    Strength: (* denotes performed with pain)  LE   Hip flexion (L2): R 5/5; L 5/5  Hip abduction: R 3/5; L 3/5  Knee Flexion: R 4/5; L 5/5   Knee extension (L3): R 4/5; L 5/5   DF (L4): R 5/5; L 5/5  Great Toe Ext (L5): R 5/5, L 5/5  PF (S1): R 5/5; L 5/5     Flexibility:   LE   Hip Flexor: Rmod decr, L mild decr  Hamstrings: R WNL; L WNL  Piriformis: R WNL; L WNL  Quads: R mod decr; L mod decr       Special tests:   SLR neg  PKB neg  Pelvic Alignment: left LE appears short; Left ASIS sup, Left PSIS     Today’s Treatment and Response:   Pt education was provided on exam findings, treatment diagnosis, treatment plan, expectations, and prognosis. Pt was also provided recommendations for activity modifications, postural corrections, and ergonomics  Patient was instructed in and issued a HEP for: Access Code: [email or web address]  Scapular retraction and cervical retraction: to be performed as an exercises and as postural correction    Charges: PT Eval Moderate Complexity, 1 TE      Total Timed Treatment: 12 min     Total Treatment Time: 42 min     Based on clinical rationale and outcome measures,  this evaluation involved Moderate Complexity decision making due to 1-2 personal factors/comorbidities, 3 body structures involved/activity limitations, and evolving symptoms including changing pain levels.  PLAN OF CARE:    Goals: (to be met in 10 visits)   Pt will improve transversus abdominis recruitment to perform proper isometric contraction without requiring verbal or tactile cuing to promote advancement of therex   Pt will demonstrate good understanding of proper posture and body mechanics to decrease pain and improve spinal safety    Pt will have decreased paraspinal mm tension to tolerate standing >10 minutes for work and home activities   Pt will demonstrate improved core strength to be able to perform standing with <4/10 pain   Pt will be independent and compliant with comprehensive HEP to maintain progress achieved in PT      Frequency / Duration: Patient will be seen for 2 x/week or a total of 10 visits over a 90 day period. Treatment will include: Manual Therapy, Neuromuscular Re-education, Therapeutic Activities, Therapeutic Exercise, and Home Exercise Program instruction    Education or treatment limitation: None  Rehab Potential:good    Oswestry Disability Index Score  Score: 20 % (3/8/2024 12:12 PM)      Patient/Family/Caregiver was advised of these findings, precautions, and treatment options and has agreed to actively participate in planning and for this course of care.    Thank you for your referral. Please co-sign or sign and return this letter via fax as soon as possible to [phone]. If you have any questions, please contact me at Dept: [phone]    Sincerely,  Electronically signed by therapist: Alina Tamayo, PT    Physician's certification required: Yes  I certify the need for these services furnished under this plan of treatment and while under my care.    X___________________________________________________ Date____________________    Certification From: 3/8/2024  To:6/6/2024

## 2024-03-11 ENCOUNTER — OFFICE VISIT (OUTPATIENT)
Dept: PHYSICAL THERAPY | Age: 69
End: 2024-03-11
Attending: ORTHOPAEDIC SURGERY
Payer: MEDICARE

## 2024-03-11 ENCOUNTER — TELEPHONE (OUTPATIENT)
Dept: PHYSICAL THERAPY | Age: 69
End: 2024-03-11

## 2024-03-11 ENCOUNTER — APPOINTMENT (OUTPATIENT)
Dept: PHYSICAL THERAPY | Age: 69
End: 2024-03-11
Attending: PHYSICAL MEDICINE & REHABILITATION
Payer: MEDICARE

## 2024-03-11 PROCEDURE — 97112 NEUROMUSCULAR REEDUCATION: CPT

## 2024-03-11 PROCEDURE — 97140 MANUAL THERAPY 1/> REGIONS: CPT

## 2024-03-11 NOTE — PROGRESS NOTES
Diagnosis:      Chronic mid back pain (M54.9,G89.29)  Anterolisthesis of lumbar spine (M43.16)  Lumbar facet arthropathy (M47.816)   Referring Provider: No ref. provider found  Date of Evaluation:    3/8/24    Precautions:  None Next MD visit:   none scheduled  Date of Surgery: n/a   Insurance Primary/Secondary: AETNA [facility] / N/A     # Auth Visits: 10 visits per POC            Subjective: Pt states that she has been trying to correct her posture.  She has not had any spasms since last visit.    Pain: 2-3/10      Objective:   Tx: See daily treatment log below  Reviewed and updated HEP    Left pelvic upslip      Assessment: Pt had good understanding of TA contractions but at first needed cues to avoid rectus abdominis compensation.  Tolerated all of treatment well without increased c/o back pain.       Goals:    (to be met in 10 visits)   Pt will improve transversus abdominis recruitment to perform proper isometric contraction without requiring verbal or tactile cuing to promote advancement of therex   Pt will demonstrate good understanding of proper posture and body mechanics to decrease pain and improve spinal safety    Pt will have decreased paraspinal mm tension to tolerate standing >10 minutes for work and home activities   Pt will demonstrate improved core strength to be able to perform standing with <4/10 pain   Pt will be independent and compliant with comprehensive HEP to maintain progress achieved in PT     Plan: Continue manual treatments, core stabilization and hip strengthening.  Date: 3/11/2024  TX#: 2/10 Date:                 TX#: 3/ Date:                 TX#: 4/ Date:                 TX#: 5/ Date:   Tx#: 6/   Man Tx: 10 min  STM to left QL  STM to bilat thoracic paraspinals and rhomboids       NMR:   Pt education regarding TA contractions  Supine TA contractions 5\" 10x  Bridges with segmental lowering 10x  S/L clams 2x10 bilat  Seated TA contractions 5\" 10x  Seated resisted rows red 2x10  Seated bilat  horiz abd red 10x  Robbery 5\" 10x                     HEP:  3/8/24:Access Code: [email or web address]  Scapular retraction and cervical retraction  3/11/24:  Access Code [email or web address]  Bridges, clams, resisted rows with red TB    Charges: 1 Man, 2 NMR      Total Timed Treatment: 43 min  Total Treatment Time: 43 min

## 2024-03-15 ENCOUNTER — OFFICE VISIT (OUTPATIENT)
Dept: PHYSICAL THERAPY | Age: 69
End: 2024-03-15
Attending: PHYSICAL MEDICINE & REHABILITATION
Payer: MEDICARE

## 2024-03-15 PROCEDURE — 97112 NEUROMUSCULAR REEDUCATION: CPT

## 2024-03-15 PROCEDURE — 97140 MANUAL THERAPY 1/> REGIONS: CPT

## 2024-03-15 NOTE — PROGRESS NOTES
Diagnosis:      Chronic mid back pain (M54.9,G89.29)  Anterolisthesis of lumbar spine (M43.16)  Lumbar facet arthropathy (M47.816)   Referring Provider: No ref. provider found  Date of Evaluation:    3/8/24    Precautions:  None Next MD visit:   none scheduled  Date of Surgery: n/a   Insurance Primary/Secondary: [employer]ETNA [facility] / N/A     # Auth Visits: 10 visits per POC            Subjective: Pt states that the more she tries to straighten up, she is more achy.    Pain: 4/10      Objective:   Tx: See daily treatment log below      Assessment: Pt is tolerating all treatment well.  She has been very conscious of her posture with ADLs.  This is possible giving her more muscle soreness of the upper back from making these postural corrections.      Goals:    (to be met in 10 visits)   Pt will improve transversus abdominis recruitment to perform proper isometric contraction without requiring verbal or tactile cuing to promote advancement of therex   Pt will demonstrate good understanding of proper posture and body mechanics to decrease pain and improve spinal safety    Pt will have decreased paraspinal mm tension to tolerate standing >10 minutes for work and home activities   Pt will demonstrate improved core strength to be able to perform standing with <4/10 pain   Pt will be independent and compliant with comprehensive HEP to maintain progress achieved in PT     Plan: Continue manual treatments, core stabilization and hip strengthening.  Date: 3/11/2024  TX#: 2/10 Date:  3/15/24               TX#: 3/10 Date:                 TX#: 4/ Date:                 TX#: 5/ Date:   Tx#: 6/   Man Tx: 10 min  STM to left QL  STM to bilat thoracic paraspinals and rhomboids Man Tx: 15 min  STM to left QL  Left QL release with inhale/exhale  STM to bilat thoracic paraspinals and rhomboids      NMR:   Pt education regarding TA contractions  Supine TA contractions 5\" 10x  Bridges with segmental lowering 10x  S/L clams 2x10 bilat  Seated TA  contractions 5\" 10x  Seated resisted rows red 2x10  Seated bilat horiz abd red 10x  Robbery 5\" 10x NMR: 27 min  Prone shoulder ext with scapular retraction 2x10  Supine TA contractions 5\" 10x  Bridges with segmental lowering 10x  S/L clams 2x10 bilat  Supine march with TA 10x R/L  Seated TA contractions with march 10x  Seated resisted rows red 2x10  Seated bilat horiz abd red 10x  Robbery 5\" 10x  Seated thoracic extension in chair with ball 10x                     HEP:  3/8/24:Access Code: [email or web address]  Scapular retraction and cervical retraction  3/11/24:  Access Code [email or web address]  Bridges, clams, resisted rows with red TB    Charges: 1 Man, 2 NMR      Total Timed Treatment: 42 min  Total Treatment Time: 42 min

## 2024-03-18 ENCOUNTER — OFFICE VISIT (OUTPATIENT)
Dept: PHYSICAL THERAPY | Age: 69
End: 2024-03-18
Attending: PHYSICAL MEDICINE & REHABILITATION
Payer: MEDICARE

## 2024-03-18 PROCEDURE — 97140 MANUAL THERAPY 1/> REGIONS: CPT

## 2024-03-18 PROCEDURE — 97112 NEUROMUSCULAR REEDUCATION: CPT

## 2024-03-18 NOTE — PROGRESS NOTES
Diagnosis:      Chronic mid back pain (M54.9,G89.29)  Anterolisthesis of lumbar spine (M43.16)  Lumbar facet arthropathy (M47.816)   Referring Provider: No ref. provider found  Date of Evaluation:    3/8/24    Precautions:  None Next MD visit:   none scheduled  Date of Surgery: n/a   Insurance Primary/Secondary: [employer]ET[employer] [facility] / N/A     # Auth Visits: 10 visits per POC            Subjective: Pt states that her knee is getting worse, so is going to check with Dr. Bedoya about getting it drained.  The low back isn't too bad but she has not been getting any spasms.      Pain:  3/10      Objective:   Tx: See daily treatment log below      Assessment: Pt is doing well with bed and sitting exercises but is limited with progression to standing exercises due to left knee pain and swelling.  She had less adhesions of thoracic paraspinals today but is still having pain in the area.        Goals:    (to be met in 10 visits)   Pt will improve transversus abdominis recruitment to perform proper isometric contraction without requiring verbal or tactile cuing to promote advancement of therex   Pt will demonstrate good understanding of proper posture and body mechanics to decrease pain and improve spinal safety    Pt will have decreased paraspinal mm tension to tolerate standing >10 minutes for work and home activities   Pt will demonstrate improved core strength to be able to perform standing with <4/10 pain   Pt will be independent and compliant with comprehensive HEP to maintain progress achieved in PT     Plan: Continue manual treatments, core stabilization and hip strengthening.  Date: 3/11/2024  TX#: 2/10 Date:  3/15/24               TX#: 3/10 Date:  3/18/24              TX#: 4/10 Date:                 TX#: 5/ Date:   Tx#: 6/   Man Tx: 10 min  STM to left QL  STM to bilat thoracic paraspinals and rhomboids Man Tx: 15 min  STM to left QL  Left QL release with inhale/exhale  STM to bilat thoracic paraspinals and rhomboids Man Tx: 15  min  STM to left QL  STM to bilat thoracic paraspinals and rhomboids  Prone upper to mid thoracic central and unilat PA mobilizations     NMR:   Pt education regarding TA contractions  Supine TA contractions 5\" 10x  Bridges with segmental lowering 10x  S/L clams 2x10 bilat  Seated TA contractions 5\" 10x  Seated resisted rows red 2x10  Seated bilat horiz abd red 10x  Robbery 5\" 10x NMR: 27 min  Prone shoulder ext with scapular retraction 2x10  Supine TA contractions 5\" 10x  Bridges with segmental lowering 10x  S/L clams 2x10 bilat  Supine march with TA 10x R/L  Seated TA contractions with march 10x  Seated resisted rows red 2x10  Seated bilat horiz abd red 10x  Robbery 5\" 10x  Seated thoracic extension in chair with ball 10x  NMR: 25 min  Prone shoulder ext with scapular retraction 2x10  Supine TA contractions 5\" 10x  Bridges with segmental lowering 10x  S/L clams 2x10 bilat  Supine march with TA 10x R/L  Seated TA contractions with march 10x  Seated resisted rows red 2x10  Seated resisted shoulder ext red 2x10  Seated bilat horiz abd red 2x10  Robbery 5\" 15x  Seated thoracic extension in chair with ball 10x                    HEP:  3/8/24:Access Code: [email or web address]  Scapular retraction and cervical retraction  3/11/24:  Access Code [email or web address]  Bridges, clams, resisted rows with red TB    Charges: 1 Man, 2 NMR      Total Timed Treatment: 40 min  Total Treatment Time: 40 min

## 2024-03-19 ENCOUNTER — PATIENT MESSAGE (OUTPATIENT)
Dept: PHYSICAL MEDICINE AND REHAB | Facility: CLINIC | Age: 69
End: 2024-03-19

## 2024-03-20 NOTE — TELEPHONE ENCOUNTER
From: Caitlyn Anguiano  To: Marguerite Gonzalez  Sent: 3/19/2024 8:58 AM CDT  Subject: knee issue.    Will it be possible for  to drain my knee and give me a cortisone injection when I visit on the 28th?  It is making it hard to do my physical therapy

## 2024-03-20 NOTE — TELEPHONE ENCOUNTER
Patient has appt w/ Dr. Gonzalez on 3/28/2024 and hoping to have knee drained and receive CSI if appropriate at this visit.    Note added to office visit.    LOV: 2/29/2024    LOV PLAN:   \"Caitlyn Anguiano is a pleasant 68-year-old female presenting today for evaluation of chronic mid back and low back pain with muscle spasms.  She has grade 2 anterolisthesis of L5 on S1 as noted on x-ray imaging.  She has facet arthropathy in the lower lumbar segments.  I recommended x-ray imaging of the thoracic spine today and also recommend starting PT program with home exercises.  Overall her pain is well-controlled and recommend she use over-the-counter Tylenol and topical lidocaine patch as needed.  She will follow-up with me in 4 weeks if the pain is still present we will obtain MRI imaging for further evaluation.  We had a brief discussion about possible interventional facet joint injection in the near future. \"    Patient does not appear to have knee diagnosis based on LOV notes.  Current PT is for lumbar spine and core/glute strengthening    MUST WAIT TO HAVE KNEE EXAMINED by Dr. Gonzalez

## 2024-03-22 ENCOUNTER — TELEPHONE (OUTPATIENT)
Facility: CLINIC | Age: 69
End: 2024-03-22

## 2024-03-22 ENCOUNTER — OFFICE VISIT (OUTPATIENT)
Dept: PHYSICAL THERAPY | Age: 69
End: 2024-03-22
Attending: PHYSICAL MEDICINE & REHABILITATION
Payer: MEDICARE

## 2024-03-22 PROCEDURE — 97112 NEUROMUSCULAR REEDUCATION: CPT

## 2024-03-22 PROCEDURE — 97140 MANUAL THERAPY 1/> REGIONS: CPT

## 2024-03-22 NOTE — TELEPHONE ENCOUNTER
Left message to call back.    Want to let her know that she is scheduled on 5/10/24 with a 12:30pm arrival time at [facility] location.

## 2024-03-22 NOTE — PROGRESS NOTES
Diagnosis:      Chronic mid back pain (M54.9,G89.29)  Anterolisthesis of lumbar spine (M43.16)  Lumbar facet arthropathy (M47.816)   Referring Provider: Carlos  Date of Evaluation:    3/8/24    Precautions:  None Next MD visit:   none scheduled  Date of Surgery: n/a   Insurance Primary/Secondary: [employer]ETNA [facility] / N/A     # Auth Visits: 10 visits per POC            Subjective: Pt states that she doc her back again yesterday while walking her dog.  She took a muscle relaxer before bed last night. The back still hurts but she didn't spasms.    Pain:  6/10 at the left low back; 2/10 at upper back      Objective:   Tx: See daily treatment log below      Assessment: Pt is having less tenderness and adhesions of thoracic and parascapular musculature.  She is improving with scapular strengthening exercises and has better awareness of her posture.        Goals:    (to be met in 10 visits)   Pt will improve transversus abdominis recruitment to perform proper isometric contraction without requiring verbal or tactile cuing to promote advancement of therex   Pt will demonstrate good understanding of proper posture and body mechanics to decrease pain and improve spinal safety    Pt will have decreased paraspinal mm tension to tolerate standing >10 minutes for work and home activities   Pt will demonstrate improved core strength to be able to perform standing with <4/10 pain   Pt will be independent and compliant with comprehensive HEP to maintain progress achieved in PT     Plan: Continue manual treatments, core stabilization and hip strengthening.  Date: 3/11/2024  TX#: 2/10 Date:  3/15/24               TX#: 3/10 Date:  3/18/24              TX#: 4/10 Date: 3/22/24                TX#: 5/10 Date:   Tx#: 6/   Man Tx: 10 min  STM to left QL  STM to bilat thoracic paraspinals and rhomboids Man Tx: 15 min  STM to left QL  Left QL release with inhale/exhale  STM to bilat thoracic paraspinals and rhomboids Man Tx: 15 min  STM to left  QL  STM to bilat thoracic paraspinals and rhomboids  Prone upper to mid thoracic central and unilat PA mobilizations Man Tx: 15 min  STM to left QL  STM to bilat thoracic paraspinals and rhomboids  Prone upper to mid thoracic central and unilat PA mobilizations    NMR:   Pt education regarding TA contractions  Supine TA contractions 5\" 10x  Bridges with segmental lowering 10x  S/L clams 2x10 bilat  Seated TA contractions 5\" 10x  Seated resisted rows red 2x10  Seated bilat horiz abd red 10x  Robbery 5\" 10x NMR: 27 min  Prone shoulder ext with scapular retraction 2x10  Supine TA contractions 5\" 10x  Bridges with segmental lowering 10x  S/L clams 2x10 bilat  Supine march with TA 10x R/L  Seated TA contractions with march 10x  Seated resisted rows red 2x10  Seated bilat horiz abd red 10x  Robbery 5\" 10x  Seated thoracic extension in chair with ball 10x  NMR: 25 min  Prone shoulder ext with scapular retraction 2x10  Supine TA contractions 5\" 10x  Bridges with segmental lowering 10x  S/L clams 2x10 bilat  Supine march with TA 10x R/L  Seated TA contractions with march 10x  Seated resisted rows red 2x10  Seated resisted shoulder ext red 2x10  Seated bilat horiz abd red 2x10  Robbery 5\" 15x  Seated thoracic extension in chair with ball 10x  NMR: 25 min  Prone shoulder ext with scapular retraction 2x10  Bridges with segmental lowering 10x  S/L clams 2x10 bilat  Supine march with TA up/up/down/down 10x R/L  Seated TA contractions with march 10x  Seated resisted rows green 2x10  Seated resisted shoulder ext red 2x10  Seated bilat horiz abd red 2x10  Robbery 5\" 15x  Seated thoracic extension in chair with ball 10x                   HEP:  3/8/24:Access Code: [email or web address]  Scapular retraction and cervical retraction  3/11/24:  Access Code [email or web address]  Bridges, clams, resisted rows with red TB    Charges: 1 Man, 2 NMR      Total Timed Treatment: 40 min  Total Treatment Time: 40 min

## 2024-03-25 ENCOUNTER — PATIENT MESSAGE (OUTPATIENT)
Dept: INTERNAL MEDICINE CLINIC | Facility: CLINIC | Age: 69
End: 2024-03-25

## 2024-03-25 DIAGNOSIS — I10 PRIMARY HYPERTENSION: Primary | ICD-10-CM

## 2024-03-25 NOTE — TELEPHONE ENCOUNTER
Left another message to call back.     Want to let her know that she is scheduled on 5/10/24 with a 12:30pm arrival time at [facility] location.

## 2024-03-26 NOTE — TELEPHONE ENCOUNTER
Verbal order given to pharmacist at [facility] pharmacy.  Detailed message left on patient's voicemail.    Scheduled for:  Colonoscopy 52224/03095  Provider Name:  Dr. Welch  Date:  05/10/2024  Location:[facility]  Sedation:  MAC  Time: 1:30pm (Patient is aware arrival time is at 12:30pm)  Prep:  Trilyte Prep Instructions Given At via Mychart  Meds/Allergies Reconciled?:  Physician Reviewed   Diagnosis with codes:  Colon Screening Z12.11/ Hx of colon polyps Z86.010

## 2024-03-26 NOTE — TELEPHONE ENCOUNTER
Pt returned call and is aware of arrival time.  She also states that she does not have RX for prep.  Please send to pharmacy.

## 2024-03-28 ENCOUNTER — OFFICE VISIT (OUTPATIENT)
Dept: PHYSICAL MEDICINE AND REHAB | Facility: CLINIC | Age: 69
End: 2024-03-28
Payer: MEDICARE

## 2024-03-28 ENCOUNTER — TELEPHONE (OUTPATIENT)
Dept: PHYSICAL MEDICINE AND REHAB | Facility: CLINIC | Age: 69
End: 2024-03-28

## 2024-03-28 VITALS
WEIGHT: 184 LBS | HEART RATE: 108 BPM | BODY MASS INDEX: 32.6 KG/M2 | OXYGEN SATURATION: 99 % | RESPIRATION RATE: 18 BRPM | HEIGHT: 63 IN

## 2024-03-28 DIAGNOSIS — M17.11 PRIMARY OSTEOARTHRITIS OF RIGHT KNEE: Primary | ICD-10-CM

## 2024-03-28 PROCEDURE — 99214 OFFICE O/P EST MOD 30 MIN: CPT | Performed by: PHYSICAL MEDICINE & REHABILITATION

## 2024-03-28 PROCEDURE — 20610 DRAIN/INJ JOINT/BURSA W/O US: CPT | Performed by: PHYSICAL MEDICINE & REHABILITATION

## 2024-03-28 RX ORDER — TRIAMCINOLONE ACETONIDE 40 MG/ML
40 INJECTION, SUSPENSION INTRA-ARTICULAR; INTRAMUSCULAR ONCE
Status: COMPLETED | OUTPATIENT
Start: 2024-03-28 | End: 2024-03-28

## 2024-03-28 RX ORDER — LIDOCAINE HYDROCHLORIDE 10 MG/ML
7 INJECTION, SOLUTION INFILTRATION; PERINEURAL ONCE
Status: COMPLETED | OUTPATIENT
Start: 2024-03-28 | End: 2024-03-28

## 2024-03-28 NOTE — PROCEDURES
Procedure:  Right knee aspiration and injection with corticosteroid  The risks, benefits and anticipated outcomes of the procedure, the risks and benefits of the alternatives to the procedure, and the roles and tasks of the personnel to be involved, were discussed with the patient, and the patient consents to the procedure and agrees to proceed.  UNIVERSAL PROTOCOL / SAFETY CHECKLIST  Sign in Communication: Completed  Time Out:  Team Confirms the Correct Patient, Correct Procedure, Correct Site and Site Marking, Correct Position (if applicable), Prep and Dry Time (if applicable).      The procedure was carried out under sterile prep with  with sterile gel.  A 27 ga 1&1/4in needle was introduced and advanced for skin anesthesia with 3 cc of 1% lidocaine.  Then, a 18 gauge 1.5 in needle was advanced and 18 cc of synovial fluid was aspirated.  Following aspiration, a mixture of 4 cc of lidocaine and 1 cc of Kenalog (40mg/ml) was injected.   The patient tolerated the procedure without complication and was instructed in post-procedure precautions.    Marguerite Gonzalez DO, FAAPMR & CAQSM  Physical Medicine and Rehabilitation/Sports Medicine  [facility]

## 2024-03-28 NOTE — TELEPHONE ENCOUNTER
Initiated Right knee aspiraiton and injection with corticosteroid CPT Code: 03173,  & Dx: M17.11 with site Availity.   Status: No auth required.     Injection done in office.

## 2024-03-28 NOTE — PROGRESS NOTES
[facility]  OFFICE FOLLOW UP EVALUATION      HISTORY OF PRESENT ILLNESS:     Chief Complaint   Patient presents with    Follow - Up     Pt is coming in to F/U on mid back pain, Pt states that pain still comes and goes, Pt states that she is also having right knee pain, and would like to see about possible injection and aspiration, Pt states that she is having spasms that come and go        Patient is following up for mid back pain but also right knee pain that has been worsening.  She received a corticosteroid injection in August 2023.  She was doing well until recently the pain has started to worsen.  She has swelling and has difficulty doing her PT exercises for the mid back as result of this pain.  She notices pain is located anteriorly and laterally.  She has had x-ray imaging which is notable for severe osteoarthritis especially in the lateral compartment.  She denies any further radiating symptoms, any change in strength or bowel bladder.    PHYSICAL EXAM:   Pulse 108   Resp 18   Ht 63\"   Wt 184 lb (83.5 kg)   SpO2 99%   BMI 32.59 kg/m²     Restricted range of motion of the right knee in flexion.  Tenderness palpation along the joint line laterally and medially.  Mild to moderate swelling noted.  Strength is 5 out of 5, sensation intact to light touch.    IMAGING:     X-ray bilateral knee completed on 8/14/2023 is notable for mild to moderate left knee medial compartment osteoarthritis and patellofemoral osteoarthritis.  There is severe right lateral compartment and moderate patellofemoral osteoarthritis    All imaging results were reviewed and discussed with patient.      ASSESSMENT/PLAN:     1. Primary osteoarthritis of right knee        Caitlyn Anguiano is a 68 year old female following up for bilateral knee pain right worse than left with osteoarthritis.  I performed a right knee aspiration injection with corticosteroid.  Please see procedure note for  further details.  Recommend she continue Tylenol, amine and conjoint and supplement and follow-up with me in 4 weeks.  She will continue PT and home exercises while    The patient verbalized understanding with the plan and was in agreement. All questions/concerns were addressed and there were no barriers to learning.  Please note Dragon dictation software was used to dictate this note and may result in inadvertent typos.    Marguerite Gonzalez DO, FAAPMR & CAQSM  Physical Medicine and Rehabilitation  Sports and Spine Medicine    PAST MEDICAL HISTORY:     Past Medical History:   Diagnosis Date    Cancer (HCC) 2012    melanoma    Colon polyps     Essential hypertension     High blood pressure     Lymph edema 2009    left foot/leg    Osteoarthritis     OTHER DISEASES     personal hx of melanoma to left foot-big toe    Personal history of antineoplastic chemotherapy 2012    PONV (postoperative nausea and vomiting)          PAST SURGICAL HISTORY:     Past Surgical History:   Procedure Laterality Date    COLONOSCOPY      COLONOSCOPY N/A 3/12/2018    Procedure: COLONOSCOPY;  Surgeon: Marvel Welch MD;  Location: [facility] ENDOSCOPY    COLONOSCOPY & POLYPECTOMY  2002    at [facility]    LIP SURGERY PROC UNLISTED Right 2010    Basal cell removal on right side of upper lip    OTHER Left     Left great toe removed/ chemotherapy    SPLENECTOMY      TONSILLECTOMY           CURRENT MEDICATIONS:     Current Outpatient Medications   Medication Sig Dispense Refill    cyclobenzaprine 5 MG Oral Tab Take 1 tablet (5 mg total) by mouth nightly as needed for Muscle spasms. 20 tablet 0    methylPREDNISolone (MEDROL) 4 MG Oral Tablet Therapy Pack As directed. 1 each 0    buPROPion  MG Oral Tablet 12 Hr Take 1 tablet (150 mg total) by mouth 2 (two) times daily. 180 tablet 3    losartan 100 MG Oral Tab Take 1 tablet (100 mg total) by mouth daily. 90 tablet 3         ALLERGIES:   No Known Allergies      FAMILY HISTORY:     Family History    Problem Relation Age of Onset    Heart Disease Mother         CAD    Diabetes Father     Heart Attack Father         myocardial infarction x2    Heart Disease Brother 64    Diabetes Brother 68    Heart Disease Brother     Colon Cancer Brother 64          SOCIAL HISTORY:     Social History     Socioeconomic History    Marital status:    Tobacco Use    Smoking status: Former     Packs/day: 0.00     Years: 50.00     Additional pack years: 0.00     Total pack years: 0.00     Types: Cigarettes     Quit date: 2020     Years since quittin.1    Smokeless tobacco: Never    Tobacco comments:     Pt state that she is under a lot of stress. Pt state that she has cut down a lot   Vaping Use    Vaping Use: Never used   Substance and Sexual Activity    Alcohol use: No    Drug use: Not Currently     Types: Cannabis     Comment: Monthly - once    Sexual activity: Yes     Partners: Female   Other Topics Concern    Caffeine Concern Yes     Comment: coffee, 3 cups daily    Reaction to local anesthetic No          REVIEW OF SYSTEMS:   A comprehensive 10 point review of systems was completed.  Pertinent positives and negatives noted in the the HPI.      LABS:   No results found for: \"EAG\", \"A1C\"  Lab Results   Component Value Date    WBC 8.3 2023    RBC 4.56 2023    HGB 13.1 2023    HCT 39.5 2023    MCV 86.6 2023    MCH 28.7 2023    MCHC 33.2 2023    RDW 14.5 2023    .0 2023    MPV 9.4 2018     Lab Results   Component Value Date    GLU 95 2023    BUN 16 2023    BUNCREA 20.0 2023    CREATSERUM 0.80 2023    ANIONGAP 5 2023    GFRNAA 63 2021    GFRAA 72 2021    CA 9.4 2023    OSMOCALC 295 2023    ALKPHO 64 2023    AST 15 2023    ALT 27 2023    ALKPHOS 52 2016    BILT 0.3 2023    TP 7.1 2023    ALB 3.8 2023    GLOBULIN 3.3 2023    AGRATIO 1.3 2016      08/14/2023    K 4.1 08/14/2023     08/14/2023    CO2 29.0 08/14/2023     No results found for: \"PTP\", \"PT\", \"INR\"  Lab Results   Component Value Date    NERU16PQ 18.7 12/06/2012

## 2024-04-01 ENCOUNTER — APPOINTMENT (OUTPATIENT)
Dept: PHYSICAL THERAPY | Age: 69
End: 2024-04-01
Attending: PHYSICAL MEDICINE & REHABILITATION
Payer: MEDICARE

## 2024-04-02 ENCOUNTER — OFFICE VISIT (OUTPATIENT)
Dept: PHYSICAL THERAPY | Age: 69
End: 2024-04-02
Attending: PHYSICAL MEDICINE & REHABILITATION
Payer: MEDICARE

## 2024-04-02 PROCEDURE — 97112 NEUROMUSCULAR REEDUCATION: CPT

## 2024-04-02 PROCEDURE — 97140 MANUAL THERAPY 1/> REGIONS: CPT

## 2024-04-02 NOTE — PROGRESS NOTES
Diagnosis:      Chronic mid back pain (M54.9,G89.29)  Anterolisthesis of lumbar spine (M43.16)  Lumbar facet arthropathy (M47.816)   Referring Provider: Carlos  Date of Evaluation:    3/8/24    Precautions:  None Next MD visit:   none scheduled  Date of Surgery: n/a   Insurance Primary/Secondary: [employer] MCR / N/A     # Auth Visits: 10 visits per POC            Subjective: Pt states that she had her knee drained so that's doing better.  Her mid back has been having more spasms this past week.      Pain:  0/10 low back; 5/10 at upper back      Objective:   Tx: See daily treatment log below      Assessment:  Pt reported that the mild spasm she had at her mid-back at start of treatment went away with STM and exercise.  She felt good with side lying thoracic rotation so this was given to her for HEP.      Goals:    (to be met in 10 visits)   Pt will improve transversus abdominis recruitment to perform proper isometric contraction without requiring verbal or tactile cuing to promote advancement of therex   Pt will demonstrate good understanding of proper posture and body mechanics to decrease pain and improve spinal safety    Pt will have decreased paraspinal mm tension to tolerate standing >10 minutes for work and home activities   Pt will demonstrate improved core strength to be able to perform standing with <4/10 pain   Pt will be independent and compliant with comprehensive HEP to maintain progress achieved in PT     Plan: Continue manual treatments, core stabilization and hip strengthening.  Date: 3/11/2024  TX#: 2/10 Date:  3/15/24               TX#: 3/10 Date:  3/18/24              TX#: 4/10 Date: 3/22/24                TX#: 5/10 Date: 4/2/24  Tx#: 6/10   Man Tx: 10 min  STM to left QL  STM to bilat thoracic paraspinals and rhomboids Man Tx: 15 min  STM to left QL  Left QL release with inhale/exhale  STM to bilat thoracic paraspinals and rhomboids Man Tx: 15 min  STM to left QL  STM to bilat thoracic paraspinals and  rhomboids  Prone upper to mid thoracic central and unilat PA mobilizations Man Tx: 15 min  STM to left QL  STM to bilat thoracic paraspinals and rhomboids  Prone upper to mid thoracic central and unilat PA mobilizations Man Tx: 15 min  STM to left QL  STM to bilat thoracic paraspinals and rhomboids  Prone upper to mid thoracic central and unilat PA mobilizations   NMR:   Pt education regarding TA contractions  Supine TA contractions 5\" 10x  Bridges with segmental lowering 10x  S/L clams 2x10 bilat  Seated TA contractions 5\" 10x  Seated resisted rows red 2x10  Seated bilat horiz abd red 10x  Robbery 5\" 10x NMR: 27 min  Prone shoulder ext with scapular retraction 2x10  Supine TA contractions 5\" 10x  Bridges with segmental lowering 10x  S/L clams 2x10 bilat  Supine march with TA 10x R/L  Seated TA contractions with march 10x  Seated resisted rows red 2x10  Seated bilat horiz abd red 10x  Robbery 5\" 10x  Seated thoracic extension in chair with ball 10x  NMR: 25 min  Prone shoulder ext with scapular retraction 2x10  Supine TA contractions 5\" 10x  Bridges with segmental lowering 10x  S/L clams 2x10 bilat  Supine march with TA 10x R/L  Seated TA contractions with march 10x  Seated resisted rows red 2x10  Seated resisted shoulder ext red 2x10  Seated bilat horiz abd red 2x10  Robbery 5\" 15x  Seated thoracic extension in chair with ball 10x  NMR: 25 min  Prone shoulder ext with scapular retraction 2x10  Bridges with segmental lowering 10x  S/L clams 2x10 bilat  Supine march with TA up/up/down/down 10x R/L  Seated TA contractions with march 10x  Seated resisted rows green 2x10  Seated resisted shoulder ext red 2x10  Seated bilat horiz abd red 2x10  Robbery 5\" 15x  Seated thoracic extension in chair with ball 10x  NMR: 28 min  Bridges with segmental lowering 10x  S/L clams 2x10 bilat  S/L thoracic books 10x R/L   Supine march with TA up/up/down/down 10x R/L  Seated TA contractions with march 10x  Seated resisted rows green  2x10  Seated bilat horiz abd red 2x10  Robbery 5\" 15x  Seated thoracic extension in chair with ball 10x   1/2 foam roll against wall with alternating shoulder flexion 10x  90/90 goal posts 10x                 HEP:  3/8/24:Access Code: [email or web address]  Scapular retraction and cervical retraction  3/11/24:  Access Code [email or web address]  Bridges, clams, resisted rows with red TB  4/2/24: Access Code [email or web address]  Side lying thoracic books    Charges: 1 Man, 2 NMR      Total Timed Treatment: 43 min  Total Treatment Time: 43 min

## 2024-04-05 NOTE — TELEPHONE ENCOUNTER
Please review; protocol failed/ has no protocol    Requested Prescriptions   Pending Prescriptions Disp Refills    CYCLOBENZAPRINE 5 MG Oral Tab [Pharmacy Med Name: CYCLOBENZAPRINE 5 MG TABLET] 20 tablet 0     Sig: TAKE 1 TABLET BY MOUTH NIGHTLY AS NEEDED FOR MUSCLE SPASMS       There is no refill protocol information for this order        Recent Outpatient Visits              3 days ago     [facility] in [place] Espinal, Melissa, PT    Office Visit    1 week ago Primary osteoarthritis of right knee    [facility], [address], [place] Marguerite Gonzalez, DO    Office Visit    2 weeks ago     [facility] in [place] Espinal, Melissa, PT    Office Visit    2 weeks ago     [facility] in [place] Espinal, Melissa, PT    Office Visit    3 weeks ago     [facility] in [place] Espinal, Melissa, PT    Office Visit          Future Appointments         Provider Department Appt Notes    In 3 days Montse Bedoya MD [facility], [address], [place] knee pain    In 3 days [facility], PT [facility] in [place] [employer] MCR  $40 c/p, no limit, no auth    In 1 week Anabella Olivarez MD [facility], [address], [place] annual    [facility] [place] [employer] MCR  $40 c/p, no limit, no auth    In 2 weeks Marguerite Gonzalez, DO [facility], [address], [place]     In 1 month TRESA PRESCOTT [facility] Colon SCRN @ [facility]

## 2024-04-08 ENCOUNTER — OFFICE VISIT (OUTPATIENT)
Dept: PHYSICAL THERAPY | Age: 69
End: 2024-04-08
Attending: PHYSICAL MEDICINE & REHABILITATION
Payer: MEDICARE

## 2024-04-08 ENCOUNTER — OFFICE VISIT (OUTPATIENT)
Dept: ORTHOPEDICS CLINIC | Facility: CLINIC | Age: 69
End: 2024-04-08
Payer: MEDICARE

## 2024-04-08 ENCOUNTER — LAB ENCOUNTER (OUTPATIENT)
Dept: LAB | Age: 69
End: 2024-04-08
Attending: INTERNAL MEDICINE
Payer: MEDICARE

## 2024-04-08 VITALS — BODY MASS INDEX: 32.6 KG/M2 | HEIGHT: 63 IN | WEIGHT: 184 LBS

## 2024-04-08 DIAGNOSIS — M17.11 PRIMARY OSTEOARTHRITIS OF RIGHT KNEE: Primary | ICD-10-CM

## 2024-04-08 DIAGNOSIS — I10 PRIMARY HYPERTENSION: ICD-10-CM

## 2024-04-08 DIAGNOSIS — M25.461 EFFUSION OF RIGHT KNEE: ICD-10-CM

## 2024-04-08 LAB
ALBUMIN SERPL-MCNC: 4.3 G/DL (ref 3.2–4.8)
ALBUMIN/GLOB SERPL: 1.7 {RATIO} (ref 1–2)
ALP LIVER SERPL-CCNC: 66 U/L
ALT SERPL-CCNC: 16 U/L
ANION GAP SERPL CALC-SCNC: 4 MMOL/L (ref 0–18)
AST SERPL-CCNC: 14 U/L (ref ?–34)
BASOPHILS # BLD AUTO: 0.06 X10(3) UL (ref 0–0.2)
BASOPHILS NFR BLD AUTO: 0.7 %
BILIRUB SERPL-MCNC: 0.5 MG/DL (ref 0.2–1.1)
BUN BLD-MCNC: 17 MG/DL (ref 9–23)
BUN/CREAT SERPL: 20.2 (ref 10–20)
CALCIUM BLD-MCNC: 9.8 MG/DL (ref 8.7–10.4)
CHLORIDE SERPL-SCNC: 109 MMOL/L (ref 98–112)
CO2 SERPL-SCNC: 28 MMOL/L (ref 21–32)
CREAT BLD-MCNC: 0.84 MG/DL
DEPRECATED RDW RBC AUTO: 49.5 FL (ref 35.1–46.3)
EGFRCR SERPLBLD CKD-EPI 2021: 75 ML/MIN/1.73M2 (ref 60–?)
EOSINOPHIL # BLD AUTO: 0.12 X10(3) UL (ref 0–0.7)
EOSINOPHIL NFR BLD AUTO: 1.3 %
ERYTHROCYTE [DISTWIDTH] IN BLOOD BY AUTOMATED COUNT: 16 % (ref 11–15)
FASTING STATUS PATIENT QL REPORTED: YES
GLOBULIN PLAS-MCNC: 2.6 G/DL (ref 2.8–4.4)
GLUCOSE BLD-MCNC: 86 MG/DL (ref 70–99)
HCT VFR BLD AUTO: 39.6 %
HGB BLD-MCNC: 13.1 G/DL
IMM GRANULOCYTES # BLD AUTO: 0.02 X10(3) UL (ref 0–1)
IMM GRANULOCYTES NFR BLD: 0.2 %
LIPASE SERPL-CCNC: 31 U/L (ref 12–53)
LYMPHOCYTES # BLD AUTO: 1.36 X10(3) UL (ref 1–4)
LYMPHOCYTES NFR BLD AUTO: 14.9 %
MCH RBC QN AUTO: 28.1 PG (ref 26–34)
MCHC RBC AUTO-ENTMCNC: 33.1 G/DL (ref 31–37)
MCV RBC AUTO: 84.8 FL
MONOCYTES # BLD AUTO: 1.14 X10(3) UL (ref 0.1–1)
MONOCYTES NFR BLD AUTO: 12.5 %
NEUTROPHILS # BLD AUTO: 6.44 X10 (3) UL (ref 1.5–7.7)
NEUTROPHILS # BLD AUTO: 6.44 X10(3) UL (ref 1.5–7.7)
NEUTROPHILS NFR BLD AUTO: 70.4 %
OSMOLALITY SERPL CALC.SUM OF ELEC: 293 MOSM/KG (ref 275–295)
PLATELET # BLD AUTO: 446 10(3)UL (ref 150–450)
POTASSIUM SERPL-SCNC: 4 MMOL/L (ref 3.5–5.1)
PROT SERPL-MCNC: 6.9 G/DL (ref 5.7–8.2)
RBC # BLD AUTO: 4.67 X10(6)UL
SODIUM SERPL-SCNC: 141 MMOL/L (ref 136–145)
TSI SER-ACNC: 1.24 MIU/ML (ref 0.55–4.78)
WBC # BLD AUTO: 9.1 X10(3) UL (ref 4–11)

## 2024-04-08 PROCEDURE — 36415 COLL VENOUS BLD VENIPUNCTURE: CPT

## 2024-04-08 PROCEDURE — 84443 ASSAY THYROID STIM HORMONE: CPT

## 2024-04-08 PROCEDURE — 97112 NEUROMUSCULAR REEDUCATION: CPT

## 2024-04-08 PROCEDURE — 97140 MANUAL THERAPY 1/> REGIONS: CPT

## 2024-04-08 PROCEDURE — 99213 OFFICE O/P EST LOW 20 MIN: CPT | Performed by: ORTHOPAEDIC SURGERY

## 2024-04-08 PROCEDURE — 80053 COMPREHEN METABOLIC PANEL: CPT

## 2024-04-08 PROCEDURE — 83690 ASSAY OF LIPASE: CPT

## 2024-04-08 PROCEDURE — 85025 COMPLETE CBC W/AUTO DIFF WBC: CPT

## 2024-04-08 NOTE — PROGRESS NOTES
[facility] Note    Chief Complaint   Patient presents with    Follow - Up     RT KNEE PAIN; SCOPE SX: 12/06/2022     HPI: The patient is a 68 year old female returning for orthopedic consultation due to recurrent pain in her right knee.  She has a history of undergoing arthroscopy in December of 2022 after which she was notable improvement.  She was recently seen by Dr. Marguerite Gonzalez due to recurrent pain and swelling on the right.  She was treated with knee aspiration with injection which has resulted in significant symptomatic relief.  She wonders if a supplemental gel cushioning injection could be beneficial.  She spends quite a bit of time on her feet walking her dog which exacerbates her pain and swelling.  She does not require assistive devices but uses a soft brace intermittently.  A compression sleeve has also been employed.  No new catching, locking, instability, redness or warmth is reported.    Past Medical History:   Diagnosis Date    Cancer (HCC) 2012    melanoma    Colon polyps     Essential hypertension     High blood pressure     Lymph edema 2009    left foot/leg    Osteoarthritis     OTHER DISEASES     personal hx of melanoma to left foot-big toe    Personal history of antineoplastic chemotherapy 2012    PONV (postoperative nausea and vomiting)      Past Surgical History:   Procedure Laterality Date    COLONOSCOPY      COLONOSCOPY N/A 3/12/2018    Procedure: COLONOSCOPY;  Surgeon: Marvel Welch MD;  Location: [facility] ENDOSCOPY    COLONOSCOPY & POLYPECTOMY  2002    at [facility]    LIP SURGERY PROC UNLISTED Right 2010    Basal cell removal on right side of upper lip    OTHER Left     Left great toe removed/ chemotherapy    SPLENECTOMY      TONSILLECTOMY       Current Outpatient Medications   Medication Sig Dispense Refill    cyclobenzaprine 5 MG Oral Tab Take 1 tablet (5 mg total) by mouth nightly as needed for Muscle spasms. 20 tablet 0    methylPREDNISolone (MEDROL) 4 MG Oral  Tablet Therapy Pack As directed. 1 each 0    buPROPion  MG Oral Tablet 12 Hr Take 1 tablet (150 mg total) by mouth 2 (two) times daily. 180 tablet 3    losartan 100 MG Oral Tab Take 1 tablet (100 mg total) by mouth daily. 90 tablet 3     No Known Allergies  Family History   Problem Relation Age of Onset    Heart Disease Mother         CAD    Diabetes Father     Heart Attack Father         myocardial infarction x2    Heart Disease Brother 64    Diabetes Brother 68    Heart Disease Brother     Colon Cancer Brother 64     Social History     Occupational History    Not on file   Tobacco Use    Smoking status: Former     Packs/day: 0.00     Years: 50.00     Additional pack years: 0.00     Total pack years: 0.00     Types: Cigarettes     Quit date: 2020     Years since quittin.1    Smokeless tobacco: Never    Tobacco comments:     Pt state that she is under a lot of stress. Pt state that she has cut down a lot   Vaping Use    Vaping Use: Never used   Substance and Sexual Activity    Alcohol use: No    Drug use: Not Currently     Types: Cannabis     Comment: Monthly - once    Sexual activity: Yes     Partners: Female        ROS:  Complete ROS reviewed by me and non-contributory to the chief complaint except as mentioned above.    Physical Exam:    Ht 5' 3\" (1.6 m)   Wt 184 lb (83.5 kg)   BMI 32.59 kg/m²   Constitutional: Well developed, well nourished 68 year old female  Psychological: NAD, alert and appropriate  Respiratory: Breathing comfortably on room air with RR of 10-14  Cardiac: Palpable distal pulses with pink warm extremities distally  Right knee:  Inspection reveals no significant discoloration but moderate valgus deformity.  Palpation reveals mild residual effusion.  Range of motion is adequate with trace flexion contracture and adequate flexion to at least 115 degrees.  Lateral joint line is tender to palpation.  Collaterals are stable on varus valgus stress with partially correctable valgus.   Lachman and posterior drawer are negative.  Popliteal space is nontender.  No significant distal edema is noted.  Neurovascular status is intact on sensory, motor and perfusion assessment distally.  Right knee exam is fairly benign with mild medial joint line tenderness.      Assessment/Diagnoses:  Diagnoses and all orders for this visit:    Primary osteoarthritis of right knee    Effusion of right knee      Plan:  I reviewed imaging and exam findings with the patient.  The diagnosis is degenerative joint disease of the right knee affecting primarily the lateral compartment.  She also has some early changes at the medial compartment of the left knee.  We discussed the etiology, natural history and treatment options in detail.  Treatments include activity modification, weight loss, anti-inflammatory use and possible injections.  In the long term, the patient's symptoms may progress and warrant consideration of total knee arthroplasty, but only if symptoms become severe.  For now, non-surgical treatment options are recommended.  She recently underwent an aspiration with injection to this knee by Dr. Marguerite Gonzalez which has resulted in notable symptomatic relief.  She inquires about the option for a gel cushioning injection but this should not be administered given her injection about 10 days ago.  She also should be free of significant effusion to improve the chances of effectiveness from viscosupplementation.  I told her that the gel injection comprises just a few milliliters of hyaluronic acid which can be diluted by significant effusions.  She will therefore continue with conservative care including activity protection, compression, icing, and anti-inflammatories as needed.  Brochure for Monovisc viscosupplementation was provided.  If symptoms return and she has no significant swelling or effusion this would be a reasonable treatment option.  The alternative may include valgus  bracing of versus total knee  arthroplasty but she would like to avoid surgery if at all possible.  Information on the Össur  brace was provided for consideration.    Montse Bedoya MD, FAAOS  Sports Medicine/Knee and Shoulder  [facility]  Phone [phone]  Fax [phone]      This document was partially prepared using Dragon Medical voice recognition software.  Although every attempt is made to correct errors during dictation, discrepancies may still exist.

## 2024-04-08 NOTE — PROGRESS NOTES
Diagnosis:      Chronic mid back pain (M54.9,G89.29)  Anterolisthesis of lumbar spine (M43.16)  Lumbar facet arthropathy (M47.816)   Referring Provider: Carlos  Date of Evaluation:    3/8/24    Precautions:  None Next MD visit:   none scheduled  Date of Surgery: n/a   Insurance Primary/Secondary: [employer] [facility] / N/A     # Auth Visits: 10 visits per POC            Subjective: Pt states that she noticed that she had been doing a lot of reaching at work to reach into the boxes of paper.  She realized that she has to stop doing that because that was probably what was causing the muscle spasms of her back. Now, she is hasn't having any pain.  Pain:  0/10      Objective:   Tx: See daily treatment log below      Assessment:  Pt with less tenderness and adhesions of left QL.  Reviewed all of HEP and pt is doing well with these.     Goals:    (to be met in 10 visits)   Pt will improve transversus abdominis recruitment to perform proper isometric contraction without requiring verbal or tactile cuing to promote advancement of therex   Pt will demonstrate good understanding of proper posture and body mechanics to decrease pain and improve spinal safety    Pt will have decreased paraspinal mm tension to tolerate standing >10 minutes for work and home activities   Pt will demonstrate improved core strength to be able to perform standing with <4/10 pain   Pt will be independent and compliant with comprehensive HEP to maintain progress achieved in PT     Plan: Pt will continue her HEP for the next week and finalize her HEP with one more PT visit next week.  Date:  3/15/24               TX#: 3/10 Date:  3/18/24              TX#: 4/10 Date: 3/22/24                TX#: 5/10 Date: 4/2/24  Tx#: 6/10 Date: 4/8/24  Tx#: 7/10   Man Tx: 15 min  STM to left QL  Left QL release with inhale/exhale  STM to bilat thoracic paraspinals and rhomboids Man Tx: 15 min  STM to left QL  STM to bilat thoracic paraspinals and rhomboids  Prone upper to mid  thoracic central and unilat PA mobilizations Man Tx: 15 min  STM to left QL  STM to bilat thoracic paraspinals and rhomboids  Prone upper to mid thoracic central and unilat PA mobilizations Man Tx: 15 min  STM to left QL  STM to bilat thoracic paraspinals and rhomboids  Prone upper to mid thoracic central and unilat PA mobilizations Man Tx: 15 min  STM to left QL  STM to bilat thoracic paraspinals and rhomboids     NMR: 27 min  Prone shoulder ext with scapular retraction 2x10  Supine TA contractions 5\" 10x  Bridges with segmental lowering 10x  S/L clams 2x10 bilat  Supine march with TA 10x R/L  Seated TA contractions with march 10x  Seated resisted rows red 2x10  Seated bilat horiz abd red 10x  Robbery 5\" 10x  Seated thoracic extension in chair with ball 10x  NMR: 25 min  Prone shoulder ext with scapular retraction 2x10  Supine TA contractions 5\" 10x  Bridges with segmental lowering 10x  S/L clams 2x10 bilat  Supine march with TA 10x R/L  Seated TA contractions with march 10x  Seated resisted rows red 2x10  Seated resisted shoulder ext red 2x10  Seated bilat horiz abd red 2x10  Robbery 5\" 15x  Seated thoracic extension in chair with ball 10x  NMR: 25 min  Prone shoulder ext with scapular retraction 2x10  Bridges with segmental lowering 10x  S/L clams 2x10 bilat  Supine march with TA up/up/down/down 10x R/L  Seated TA contractions with march 10x  Seated resisted rows green 2x10  Seated resisted shoulder ext red 2x10  Seated bilat horiz abd red 2x10  Robbery 5\" 15x  Seated thoracic extension in chair with ball 10x  NMR: 28 min  Bridges with segmental lowering 10x  S/L clams 2x10 bilat  S/L thoracic books 10x R/L   Supine march with TA up/up/down/down 10x R/L  Seated TA contractions with march 10x  Seated resisted rows green 2x10  Seated bilat horiz abd red 2x10  Robbery 5\" 15x  Seated thoracic extension in chair with ball 10x   1/2 foam roll against wall with alternating shoulder flexion 10x  90/90 goal posts 10x NMR:  26 min  Bridges with segmental lowering 10x  S/L clams 2x10 bilat  S/L thoracic books 10x R/L   Supine march with TA up/up/down/down 10x R/L  Seated TA contractions with march 10x with red Tband  Seated resisted rows green 2x10  Seated bilat horiz abd red 2x10  Robbery 5\" 15x  1/2 foam roll against wall with alternating shoulder flexion 10x  90/90 goal posts 10x                 HEP:  3/8/24:Access Code: [email or web address]  Scapular retraction and cervical retraction  3/11/24:  Access Code [email or web address]  Bridges, clams, resisted rows with red TB  4/2/24: Access Code [email or web address]  Side lying thoracic books  4/8/24: added red TB with seated march    Charges: 1 Man, 2 NMR      Total Timed Treatment: 41 min  Total Treatment Time: 41 min

## 2024-04-09 RX ORDER — CYCLOBENZAPRINE HCL 5 MG
5 TABLET ORAL NIGHTLY PRN
Qty: 20 TABLET | Refills: 0 | Status: SHIPPED | OUTPATIENT
Start: 2024-04-09

## 2024-04-12 ENCOUNTER — OFFICE VISIT (OUTPATIENT)
Dept: INTERNAL MEDICINE CLINIC | Facility: CLINIC | Age: 69
End: 2024-04-12
Payer: MEDICARE

## 2024-04-12 VITALS
HEIGHT: 63 IN | WEIGHT: 173 LBS | DIASTOLIC BLOOD PRESSURE: 83 MMHG | BODY MASS INDEX: 30.65 KG/M2 | SYSTOLIC BLOOD PRESSURE: 136 MMHG | HEART RATE: 71 BPM

## 2024-04-12 DIAGNOSIS — K63.5 POLYP OF COLON, UNSPECIFIED PART OF COLON, UNSPECIFIED TYPE: ICD-10-CM

## 2024-04-12 DIAGNOSIS — Z90.81 HX OF SPLENECTOMY: ICD-10-CM

## 2024-04-12 DIAGNOSIS — D22.9 NUMEROUS SKIN MOLES: ICD-10-CM

## 2024-04-12 DIAGNOSIS — Z87.891 HX OF SMOKING: ICD-10-CM

## 2024-04-12 DIAGNOSIS — Z85.820 HX OF MALIGNANT MELANOMA: ICD-10-CM

## 2024-04-12 DIAGNOSIS — I89.0 LYMPHEDEMA OF LEFT LEG: ICD-10-CM

## 2024-04-12 DIAGNOSIS — Z98.890 S/P ARTHROSCOPIC SURGERY OF RIGHT KNEE: ICD-10-CM

## 2024-04-12 DIAGNOSIS — Z00.00 MEDICARE ANNUAL WELLNESS VISIT, SUBSEQUENT: ICD-10-CM

## 2024-04-12 DIAGNOSIS — Z87.19 HX OF GASTROESOPHAGEAL REFLUX (GERD): ICD-10-CM

## 2024-04-12 DIAGNOSIS — J30.2 SEASONAL ALLERGIC RHINITIS, UNSPECIFIED TRIGGER: ICD-10-CM

## 2024-04-12 DIAGNOSIS — E78.00 PURE HYPERCHOLESTEROLEMIA: ICD-10-CM

## 2024-04-12 DIAGNOSIS — I10 PRIMARY HYPERTENSION: ICD-10-CM

## 2024-04-12 DIAGNOSIS — E55.9 VITAMIN D DEFICIENCY: Primary | ICD-10-CM

## 2024-04-12 NOTE — PROGRESS NOTES
Subjective:   Caitlyn Anguiano is a 68 year old female who presents for a Medicare Subsequent Annual Wellness visit (Pt already had Initial Annual Wellness).     Patient presents today for Medicare  physical exam, HTN, f/u labs   states doing well otherwise,denies chest pain, shortness of breath, dyspnea on exertion or heart palpitations also denies nausea, vomiting, diarrhea, constipation or abdominal pain. No fever, chills, or UTI symptoms.   The rest of the review of systems, see below.     Not smoking , quit years ago -years ago , but has  cravings -  buproprion  is helping her .      History/Other:   Fall Risk Assessment:   She has been screened for Falls and is low risk.      Cognitive Assessment:   She had a completely normal cognitive assessment - see flowsheet entries     Functional Ability/Status:   Caitlyn Anguiano has some abnormal functions as listed below:  She has Walking problems based on screening of functional status.       Depression Screening (PHQ-2/PHQ-9): PHQ-2 SCORE: 0  , done 4/12/2024   Feeling tired or having little energy: 1    If you checked off any problems, how difficult have these problems made it for you to do your work, take care of things at home, or get along with other people?: Not difficult at all    Last [place] Suicide Screening on 4/12/2024 was No Risk.     Advanced Directives:   She does NOT have a Living Will. [Do you have a living will?: No]  She does NOT have a Power of  for Health Care. [Do you have a healthcare power of ?: No]  Patient has Advance Care Planning documents but we do not have a copy in EMR. Discussed Advanced Care Planning with patient and instructed patient to get our office a copy to be scanned into EMR.      Patient Active Problem List   Diagnosis    Hx of malignant melanoma    Ankle swelling    HTN (hypertension)    Leg swelling    Allergic rhinitis    Physical exam    Essential hypertension    Melanoma (HCC)    Acute pain of left  shoulder    Sprain of left rotator cuff capsule    Lymphedema of left leg    Hx of splenectomy    Numerous skin moles    Recurrent pain of right knee    S/P arthroscopic surgery of right knee    Hx of smoking    Postmenopausal    Polyp of colon    Hx of gastroesophageal reflux (GERD)     Allergies:  She has No Known Allergies.    Current Medications:  Outpatient Medications Marked as Taking for the 4/12/24 encounter (Office Visit) with Anabella Olivarez MD   Medication Sig    cyclobenzaprine 5 MG Oral Tab Take 1 tablet (5 mg total) by mouth nightly as needed for Muscle spasms.    buPROPion  MG Oral Tablet 12 Hr Take 1 tablet (150 mg total) by mouth 2 (two) times daily.    losartan 100 MG Oral Tab Take 1 tablet (100 mg total) by mouth daily.       Medical History:  She  has a past medical history of Cancer (HCC) (2012), Colon polyps, Essential hypertension, High blood pressure, Lymph edema (2009), Osteoarthritis, OTHER DISEASES, Personal history of antineoplastic chemotherapy (2012), and PONV (postoperative nausea and vomiting).  Surgical History:  She  has a past surgical history that includes splenectomy; other (Left); lip surgery proc unlisted (Right, 2010); colonoscopy & polypectomy (2002); tonsillectomy; colonoscopy; and colonoscopy (N/A, 3/12/2018).   Family History:  Her family history includes Colon Cancer (age of onset: 64) in her brother; Diabetes in her father; Diabetes (age of onset: 68) in her brother; Heart Attack in her father; Heart Disease in her brother and mother; Heart Disease (age of onset: 64) in her brother.  Social History:  She  reports that she quit smoking about 54 years ago. Her smoking use included cigarettes. She has never used smokeless tobacco. She reports that she does not currently use drugs after having used the following drugs: Cannabis. She reports that she does not drink alcohol.    Tobacco:  She smoked tobacco in the past but quit greater than 12 months ago.  Social  History     Tobacco Use   Smoking Status Former    Current packs/day: 0.00    Types: Cigarettes    Quit date: 1970    Years since quittin.1   Smokeless Tobacco Never   Tobacco Comments    Pt state that she is under a lot of stress. Pt state that she has cut down a lot          CAGE Alcohol Screen:   CAGE screening score of 0 on 2024, showing low risk of alcohol abuse.      Patient Care Team:  Anabella Olivarez MD as PCP - General (Internal Medicine)  Montse Bedoya MD (SURGERY, ORTHOPEDIC)  Irene Tariq, PT (Physical Therapy)    Review of Systems  GENERAL: feels well otherwise  SKIN: denies any unusual skin lesions  EYES: denies blurred vision or double vision  HEENT: denies nasal congestion, sinus pain or ST  LUNGS: denies shortness of breath with exertion  CARDIOVASCULAR: denies chest pain on exertion  GI: denies abdominal pain, denies heartburn  : denies dysuria, vaginal discharge or itching, no complaint of urinary incontinence   MUSCULOSKELETAL: denies back pain patient complains of the right knee pain after surgery   she is seeing orthopedic doctor, just had steroid injection from Dr Gonzalez   NEURO: denies headaches  PSYCHE: denies depression or anxiety  HEMATOLOGIC: denies hx of anemia  ENDOCRINE: denies thyroid history  ALL/ASTHMA: has  of allergy-stable now or asthma    Objective:   Physical Exam  General Appearance:  Alert, cooperative, no distress, appears stated age   Head:  Normocephalic, without obvious abnormality, atraumatic   Eyes:  PERRL, conjunctiva/corneas clear, EOM's intact both eyes   Ears:  Normal TM's and external ear canals, both ears   Nose:  Normal ,no sinus tenderness   Throat:  Normal oral cavity   Neck: Supple, symmetrical, trachea midline, no adenopathy;  thyroid: not enlarged, symmetric, no tenderness/mass/nodules; no carotid bruit or JVD   Back:   Symmetric, no curvature, ROM normal, no CVA tenderness   Lungs:   Clear to auscultation bilaterally,  respirations unlabored   Heart:  Regular rate and rhythm, S1 and S2 normal, no murmur, rub, or gallop   Abdomen:   Soft, non-tender, bowel sounds active all four quadrants,  no masses, no organomegaly   Pelvic: Deferred   Extremities: Extremities normal, atraumatic, no cyanosis or edema   Pulses: 2+ and symmetric   right knee no erythema or  tenderness .   Skin: Skin color, texture, turgor normal, no rashes or lesions   Lymph nodes: Cervical, supraclavicular, and axillary nodes normal   Neurologic: Normal       /83 (BP Location: Left arm, Patient Position: Sitting, Cuff Size: large)   Pulse 71   Ht 5' 3\" (1.6 m)   Wt 173 lb (78.5 kg)   BMI 30.65 kg/m²  Estimated body mass index is 30.65 kg/m² as calculated from the following:    Height as of this encounter: 5' 3\" (1.6 m).    Weight as of this encounter: 173 lb (78.5 kg).    Medicare Hearing Assessment:   Hearing Screening    Time taken: 4/12/2024  9:33 AM  Entry User: Rajesh Rayo LPN  Screening Method: Finger Rub  Finger Rub Result: Pass         Visual Acuity:   Right Eye Visual Acuity: Uncorrected Right Eye Chart Acuity: 20/40   Left Eye Visual Acuity: Uncorrected Left Eye Chart Acuity: 20/100   Both Eyes Visual Acuity: Uncorrected Both Eyes Chart Acuity: 20/70            Assessment & Plan:   Caitlyn Anguiano is a 68 year old female who presents for a Medicare Assessment.     1. Medicare annual wellness visit, subsequent (Primary)  Maintain a healthy diet , low saturated fat and low sugar diet  Keep good hydration  Maintain a regular activity /walking as tolerated   Complete labs as ordered- discussed   Mammogram - ordered   colonoscopy - due 3/23  5 year f/u colonoscopy  -referred  scheduled -  5/10 /24 - Dr Welch   dexa  Scan -ordered    recommend calcium rich diet as well as vitamin D diet   patient education  Preventative health maintenance tests reviewed   Immunizations reviewed -   tdap  -today   , flu shot yearly  ,   RSV ,covid 19   Booster    Advanced  Directives discussed with pt - POA - is her son -Medicare power of  forms provided to patient  Patient verbalized understanding and compliance     2. Essential hypertension  Take high blood pressure medication as perscribed   Low- sodium diet   Maintain walking - as tolerated   Track and record blood pressure and heart rate at home   The side effects of medication discussed with patient   Patient verbalizes understanding and compliance  Stable cpm   Losartan  50 mg every day   Continue present management low-salt salt diet  Labs discussed with pt        3. Hx of malignant melanoma  7. Numerous skin moles  Dermatologist    Referred -patient to see Dr. Carson for dermatologist     4. Seasonal allergic rhinitis, unspecified trigger  Stable  cpm   Flonase  2 puffs  every day few  -  1- week  as needed     5. Lymphedema of left leg  Stable -improved   keep with low-salt diet  Compression stockings     6. Hx of splenectomy  Stable    Stable patient is asymptomatic  Immunisation discussed   Per pt got Hib vaccine -years ago before her previous surgery   Recommend RSV -in pharmacy   Covid 19 and flu shot  regularly         7.Right  Knee pain   S/p r knee surgery 12/23  Hx  torn meniscus   Seeing  Ortho   Just got  steroid  inj - Dr Gonzalez   Slowly improving  CPm   Dr Young - orthopedics    Labs to complete-recommend patient to keep with low-salt diet avoid uric acid  Rich food in diet-possible gout?    Patient education follow-up orthopedics for now      Hx  smoking- quit years ago    cravings now   Buproprion  150 bid -  stable cpm   Pt educaiton tolerating well and medication is helping  Refills    Side effects and directions of medication discussed with patient. Patient verbalized understanding and compliance.      Hx gerd  On maloxicam now   Avoid spicy food   Nsaids -avoid   Famotidine 20 mg  bid  as  needed   Stable on  diet   Pt education           On  weight  watchers sunni  Losing weight   nicely on   change of diet           The patient indicates understanding of these issues and agrees to the plan.  Continue with current treatment plan.  Reinforced healthy diet, lifestyle, and exercise.      No follow-ups on file.     Anabella Olivarez MD, 9/9/2022     Supplementary Documentation:   General Health:  In the past six months, have you lost more than 10 pounds without trying?: 2 - No  Has your appetite been poor?: No  Type of Diet: Balanced  How does the patient maintain a good energy level?: Daily Walks  How would you describe your daily physical activity?: Moderate  How would you describe your current health state?: Good  How do you maintain positive mental well-being?: Social Interaction;Games;Visiting Friends;Visiting Family  On a scale of 0 to 10, with 0 being no pain and 10 being severe pain, what is your pain level?: 2 - (Mild)  In the past six months, have you experienced urine leakage?: 0-No  At any time do you feel concerned for the safety/well-being of yourself and/or your children, in your home or elsewhere?: No  Have you had any immunizations at another office such as Influenza, Hepatitis B, Tetanus, or Pneumococcal?: No       Caitlyn Anguiano's SCREENING SCHEDULE   Tests on this list are recommended by your physician but may not be covered, or covered at this frequency, by your insurer.   Please check with your insurance carrier before scheduling to verify coverage.   PREVENTATIVE SERVICES FREQUENCY &  COVERAGE DETAILS LAST COMPLETION DATE   Diabetes Screening    Fasting Blood Sugar /  Glucose    One screening every 12 months if never tested or if previously tested but not diagnosed with pre-diabetes   One screening every 6 months if diagnosed with pre-diabetes Lab Results   Component Value Date    GLU 86 04/08/2024        Cardiovascular Disease Screening    Lipid Panel  Cholesterol  Lipoprotein (HDL)  Triglycerides Covered every 5 years for all Medicare beneficiaries without apparent signs or  symptoms of cardiovascular disease Lab Results   Component Value Date    CHOLEST 194 08/14/2023    HDL 80 (H) 08/14/2023    LDL 99 08/14/2023    TRIG 86 08/14/2023         Electrocardiogram (EKG)   Covered if needed at Welcome to Medicare, and non-screening if indicated for medical reasons 11/23/2022      Ultrasound Screening for Abdominal Aortic Aneurysm (AAA) Covered once in a lifetime for one of the following risk factors    Men who are 65-75 years old and have ever smoked    Anyone with a family history -     Colorectal Cancer Screening  Covered for ages 50-85; only need ONE of the following:    Colonoscopy   Covered every 10 years    Covered every 2 years if patient is at high risk or previous colonoscopy was abnormal 03/12/2018    Health Maintenance   Topic Date Due    Colorectal Cancer Screening  03/12/2023       Flexible Sigmoidoscopy   Covered every 4 years -    Fecal Occult Blood Test Covered annually -   Bone Density Screening    Bone density screening    Covered every 2 years after age 65 if diagnosed with risk of osteoporosis or estrogen deficiency.    Covered yearly for long-term glucocorticoid medication use (Steroids) Last Dexa Scan:    XR DEXA BONE DENSITOMETRY (CPT=77080) 11/27/2023      No recommendations at this time   Pap and Pelvic    Pap   Covered every 2 years for women at normal risk; Annually if at high risk -  No recommendations at this time    Chlamydia Annually if high risk -  No recommendations at this time   Screening Mammogram    Mammogram     Recommend annually for all female patients aged 40 and older    One baseline mammogram covered for patients aged 35-39 03/07/2024    Health Maintenance   Topic Date Due    Mammogram  03/07/2025       Immunizations    Influenza Covered once per flu season  Please get every year 10/16/2023  No recommendations at this time    Pneumococcal Each vaccine ([id number] & [id number]) covered once after 65 Prevnar 13: 02/13/2018    [id number]: 09/17/2019      No recommendations at this time    Hepatitis B One screening covered for patients with certain risk factors   -  No recommendations at this time    Tetanus Toxoid Not covered by Medicare Part B unless medically necessary (cut with metal); may be covered with your pharmacy prescription benefits -    Tetanus, Diptheria and Pertusis TD and TDaP Not covered by Medicare Part B -  No recommendations at this time    Zoster Not covered by Medicare Part B; may be covered with your pharmacy  prescription benefits -  No recommendations at this time     Annual Monitoring of Persistent Medications (ACE/ARB, digoxin diuretics, anticonvulsants)    Potassium Annually Lab Results   Component Value Date    K 4.0 04/08/2024         Creatinine   Annually Lab Results   Component Value Date    CREATSERUM 0.84 04/08/2024         BUN Annually Lab Results   Component Value Date    BUN 17 04/08/2024       Drug Serum Conc Annually No results found for: \"DIGOXIN\", \"DIG\", \"VALP\"

## 2024-04-15 ENCOUNTER — APPOINTMENT (OUTPATIENT)
Dept: PHYSICAL THERAPY | Age: 69
End: 2024-04-15
Attending: PHYSICAL MEDICINE & REHABILITATION
Payer: MEDICARE

## 2024-05-10 ENCOUNTER — ANESTHESIA (OUTPATIENT)
Dept: ENDOSCOPY | Facility: HOSPITAL | Age: 69
End: 2024-05-10
Payer: MEDICARE

## 2024-05-10 ENCOUNTER — HOSPITAL ENCOUNTER (OUTPATIENT)
Facility: HOSPITAL | Age: 69
Setting detail: HOSPITAL OUTPATIENT SURGERY
Discharge: HOME | End: 2024-05-10
Attending: INTERNAL MEDICINE | Admitting: INTERNAL MEDICINE

## 2024-05-10 ENCOUNTER — ANESTHESIA EVENT (OUTPATIENT)
Dept: ENDOSCOPY | Facility: HOSPITAL | Age: 69
End: 2024-05-10
Payer: MEDICARE

## 2024-05-10 VITALS
HEART RATE: 72 BPM | TEMPERATURE: 97 F | SYSTOLIC BLOOD PRESSURE: 168 MMHG | WEIGHT: 166 LBS | BODY MASS INDEX: 30.94 KG/M2 | RESPIRATION RATE: 13 BRPM | OXYGEN SATURATION: 98 % | HEIGHT: 61.5 IN | DIASTOLIC BLOOD PRESSURE: 83 MMHG

## 2024-05-10 DIAGNOSIS — Z86.010 HX OF COLONIC POLYP: ICD-10-CM

## 2024-05-10 DIAGNOSIS — Z12.11 COLON CANCER SCREENING: ICD-10-CM

## 2024-05-10 PROCEDURE — 0DBN8ZX EXCISION OF SIGMOID COLON, VIA NATURAL OR ARTIFICIAL OPENING ENDOSCOPIC, DIAGNOSTIC: ICD-10-PCS | Performed by: INTERNAL MEDICINE

## 2024-05-10 PROCEDURE — 0DBK8ZX EXCISION OF ASCENDING COLON, VIA NATURAL OR ARTIFICIAL OPENING ENDOSCOPIC, DIAGNOSTIC: ICD-10-PCS | Performed by: INTERNAL MEDICINE

## 2024-05-10 PROCEDURE — 0DBL8ZX EXCISION OF TRANSVERSE COLON, VIA NATURAL OR ARTIFICIAL OPENING ENDOSCOPIC, DIAGNOSTIC: ICD-10-PCS | Performed by: INTERNAL MEDICINE

## 2024-05-10 PROCEDURE — 45380 COLONOSCOPY AND BIOPSY: CPT | Performed by: INTERNAL MEDICINE

## 2024-05-10 PROCEDURE — 45385 COLONOSCOPY W/LESION REMOVAL: CPT | Performed by: INTERNAL MEDICINE

## 2024-05-10 DEVICE — REPLAY HEMOSTASIS CLIP, 11MM SPAN
Type: IMPLANTABLE DEVICE | Site: COLON | Status: FUNCTIONAL
Brand: REPLAY

## 2024-05-10 RX ORDER — SODIUM CHLORIDE, SODIUM LACTATE, POTASSIUM CHLORIDE, CALCIUM CHLORIDE 600; 310; 30; 20 MG/100ML; MG/100ML; MG/100ML; MG/100ML
INJECTION, SOLUTION INTRAVENOUS CONTINUOUS
Status: DISCONTINUED | OUTPATIENT
Start: 2024-05-10 | End: 2024-05-10

## 2024-05-10 RX ORDER — LIDOCAINE HYDROCHLORIDE 10 MG/ML
INJECTION, SOLUTION EPIDURAL; INFILTRATION; INTRACAUDAL; PERINEURAL AS NEEDED
Status: DISCONTINUED | OUTPATIENT
Start: 2024-05-10 | End: 2024-05-10 | Stop reason: SURG

## 2024-05-10 RX ADMIN — SODIUM CHLORIDE, SODIUM LACTATE, POTASSIUM CHLORIDE, CALCIUM CHLORIDE: 600; 310; 30; 20 INJECTION, SOLUTION INTRAVENOUS at 14:05:00

## 2024-05-10 RX ADMIN — LIDOCAINE HYDROCHLORIDE 40 MG: 10 INJECTION, SOLUTION EPIDURAL; INFILTRATION; INTRACAUDAL; PERINEURAL at 13:37:00

## 2024-05-10 RX ADMIN — SODIUM CHLORIDE, SODIUM LACTATE, POTASSIUM CHLORIDE, CALCIUM CHLORIDE: 600; 310; 30; 20 INJECTION, SOLUTION INTRAVENOUS at 13:28:00

## 2024-05-10 NOTE — ANESTHESIA PREPROCEDURE EVALUATION
Anesthesia PreOp Note    HPI:     Caitlyn Anguiano is a 68 year old female who presents for preoperative consultation requested by: Marvel Welch MD    Date of Surgery: 5/10/2024    Procedure(s):  COLONOSCOPY  Indication: Colon cancer screening/ Hx of colonic polyp    Relevant Problems   No relevant active problems       NPO:                         History Review:  Patient Active Problem List    Diagnosis Date Noted   • S/P arthroscopic surgery of right knee 09/12/2023   • Hx of smoking 09/12/2023   • Postmenopausal 09/12/2023   • Polyp of colon 09/12/2023   • Hx of gastroesophageal reflux (GERD) 09/12/2023   • Recurrent pain of right knee 11/30/2022   • Numerous skin moles 07/20/2021   • Hx of splenectomy 10/14/2020   • Lymphedema of left leg 03/09/2019   • Sprain of left rotator cuff capsule 08/04/2016   • Acute pain of left shoulder 07/19/2016   • Melanoma (HCC) 05/04/2016   • Physical exam 04/22/2016   • Essential hypertension 04/22/2016   • Allergic rhinitis 10/14/2014   • Leg swelling 08/30/2014   • Ankle swelling 08/17/2014   • HTN (hypertension) 08/17/2014   • Hx of malignant melanoma 06/08/2009       Past Medical History:   • Cancer (HCC)    melanoma   • Colon polyps   • Essential hypertension   • High blood pressure   • Lymph edema    left foot/leg   • Osteoarthritis   • OTHER DISEASES    personal hx of melanoma to left foot-big toe   • Personal history of antineoplastic chemotherapy   • PONV (postoperative nausea and vomiting)       Past Surgical History:   Procedure Laterality Date   • Colonoscopy     • Colonoscopy N/A 3/12/2018    Procedure: COLONOSCOPY;  Surgeon: Marvel Welch MD;  Location: [facility] ENDOSCOPY   • Colonoscopy & polypectomy  2002    at [facility]   • Lip surgery proc unlisted Right 2010    Basal cell removal on right side of upper lip   • Other Left     Left great toe removed/ chemotherapy   • Splenectomy     • Tonsillectomy         Facility-Administered Medications Prior  to Admission   Medication Dose Route Frequency Provider Last Rate Last Admin   • [COMPLETED] triamcinolone acetonide (Kenalog-40) 40 MG/ML injection 40 mg  40 mg Intra-articular Once GonzalezMarguerite oneal Y, DO   40 mg at 24 1552   • [COMPLETED] lidocaine (Xylocaine) 1 % injection  7 mL Intra-articular Once Gonzalez, Yogen Y, DO   7 mL at 24 1552     Medications Prior to Admission   Medication Sig Dispense Refill Last Dose   • cyclobenzaprine 5 MG Oral Tab Take 1 tablet (5 mg total) by mouth nightly as needed for Muscle spasms. 20 tablet 0    • buPROPion  MG Oral Tablet 12 Hr Take 1 tablet (150 mg total) by mouth 2 (two) times daily. 180 tablet 3    • losartan 100 MG Oral Tab Take 1 tablet (100 mg total) by mouth daily. 90 tablet 3      No current Epic-ordered facility-administered medications on file.     No current Epic-ordered outpatient medications on file.       No Known Allergies    Family History   Problem Relation Age of Onset   • Heart Disease Mother         CAD   • Diabetes Father    • Heart Attack Father         myocardial infarction x2   • Heart Disease Brother 64   • Diabetes Brother 68   • Heart Disease Brother    • Colon Cancer Brother 64     Social History     Socioeconomic History   • Marital status:    Tobacco Use   • Smoking status: Former     Current packs/day: 0.00     Types: Cigarettes     Quit date: 1970     Years since quittin.2   • Smokeless tobacco: Never   • Tobacco comments:     Pt state that she is under a lot of stress. Pt state that she has cut down a lot   Vaping Use   • Vaping status: Never Used   Substance and Sexual Activity   • Alcohol use: No   • Drug use: Not Currently     Types: Cannabis     Comment: Monthly - once   • Sexual activity: Yes     Partners: Female   Other Topics Concern   • Caffeine Concern Yes     Comment: coffee, 3 cups daily   • Reaction to local anesthetic No       Available pre-op labs reviewed.  Lab Results   Component Value Date     WBC 9.1 04/08/2024    RBC 4.67 04/08/2024    HGB 13.1 04/08/2024    HCT 39.6 04/08/2024    MCV 84.8 04/08/2024    MCH 28.1 04/08/2024    MCHC 33.1 04/08/2024    RDW 16.0 (H) 04/08/2024    .0 04/08/2024     Lab Results   Component Value Date     04/08/2024    K 4.0 04/08/2024     04/08/2024    CO2 28.0 04/08/2024    BUN 17 04/08/2024    CREATSERUM 0.84 04/08/2024    GLU 86 04/08/2024    CA 9.8 04/08/2024          Vital Signs:  Body mass index is 30.86 kg/m².   height is 1.562 m (5' 1.5\") and weight is 75.3 kg (166 lb).   Vitals:    05/08/24 0833   Weight: 75.3 kg (166 lb)   Height: 1.562 m (5' 1.5\")        Anesthesia Evaluation     Patient summary reviewed and Nursing notes reviewed    History of anesthetic complications   Airway   Mallampati: I  TM distance: >3 FB  Neck ROM: full  Dental      Pulmonary - normal exam   Cardiovascular - normal exam  Exercise tolerance: good  (+) hypertension well controlled    ECG reviewed    Neuro/Psych - negative ROS     GI/Hepatic/Renal - negative ROS     Endo/Other - negative ROS   Abdominal   (+) obese               Anesthesia Plan:   ASA:  2  Plan:   General  Informed Consent Plan and Risks Discussed With:  Patient  Discussed plan with:  Surgeon      I have informed Caitlyn Anguiano and/or legal guardian or family member of the nature of the anesthetic plan, benefits, risks including possible dental damage if relevant, major complications, and any alternative forms of anesthetic management.   All of the patient's questions were answered to the best of my ability. The patient desires the anesthetic management as planned.  Yadira Hyde CRNA  5/10/2024 12:48 PM  Present on Admission:  **None**

## 2024-05-10 NOTE — OPERATIVE REPORT
[facility] Endoscopy Report  Date of procedure-May 10, 2024     Preoperative Diagnosis:  -Colorectal cancer screening  -History colon polyps        Postoperative Diagnosis:  -Colon polyps x 5  -Diverticulosis  -Internal hemorrhoids        Procedure:    Colonoscopy         Surgeon:  Marvel Weclh M.D.     Anesthesia:  MAC      Technique:  After informed consent, the patient was placed in the left lateral recumbent position.  Digital rectal examination revealed no palpable intraluminal abnormalities.  An Olympus variable stiffness 190 series HD colonoscope was inserted into the rectum and advanced under direct vision by following the lumen to the cecum.  The colon was examined upon withdrawal in the left lateral position.     The procedure was well tolerated without immediate complication.        Findings:  The preparation of the colon was good.  The terminal ileum was examined for 4 cm and visually normal.  The ileocecal valve was well preserved. The visualized colonic mucosa from the cecum to the anal verge was normal with an intact vascular pattern.     Colon polyps x 5 removed as follows;  -Ascending x 3, the first polyp was sessile 1.2 cm in size removed by cold snare technique.  Three clips were placed across the mucosal defect.  Two diminutive polyps removed from the segment by cold forceps technique.   -Transverse x 1, sessile 4 mm in size and cold snare removed.  -Sigmoid x 1, diminutive removed by cold forceps technique.  All polypectomy sites inspected and found to be free of bleeding specimens retrieved and sent for analysis.     Located in the sigmoid and descending colon, no diverticulitis.     Small internal hemorrhoids noted on retroflexed view.     Estimated blood loss-insignificant  Specimens-see above     Impression:  -Colon polyps x 5  -Diverticulosis  -Internal hemorrhoids     Recommendations:  - Post polypectomy instructions given  - Repeat colonoscopy in 3- 5 years  - High fiber  diet for diverticular disease  - Symptomatic treatment of hemorrhoids              Marvel Welch MD  5/10/2024  2:10 PM

## 2024-05-10 NOTE — DISCHARGE INSTRUCTIONS
Home Care Instructions for Colonoscopy with Sedation    Diet:  - Resume your regular diet as tolerated unless otherwise instructed.  - Start with light meals to minimize bloating.  - Do not drink alcohol today.    Medication:  - If you have questions about resuming your normal medications, please contact your Primary Care Physician.    Activities:  - Take it easy today. Do not return to work today.  - Do not drive today.  - Do not operate any machinery today (including kitchen equipment).    Colonoscopy:  - You may notice some rectal \"spotting\" (a little blood on the toilet tissue) for a day or two after the exam. This is normal.  - If you experience any rectal bleeding (not spotting), persistent tenderness or sharp severe abdominal pains, oral temperature over 100 degrees Fahrenheit, light-headedness or dizziness, or any other problems, contact your doctor.    **If unable to reach your doctor, please go to the [facility] Emergency Room**    - Your referring physician will receive a full report of your examination.  - If you do not hear from your doctor's office within two weeks of your biopsy, please call them for your results.    You may be able to see your laboratory results in [email or web address] between 4 and 7 business days.  In some cases, your physician may not have viewed the results before they are released to [email or web address].  If you have questions regarding your results contact the physician who ordered the test/exam by phone or via [email or web address] by choosing \"Ask a Medical Question.\"Home Care Instructions for Colonoscopy with Sedation    Diet:  - Resume your regular diet as tolerated unless otherwise instructed.  - Start with light meals to minimize bloating.  - Do not drink alcohol today.    Medication:  - If you have questions about resuming your normal medications, please contact your Primary Care Physician.    Activities:  - Take it easy today. Do not return to work today.  - Do not drive today.  - Do not  operate any machinery today (including kitchen equipment).    Colonoscopy:  - You may notice some rectal \"spotting\" (a little blood on the toilet tissue) for a day or two after the exam. This is normal.  - If you experience any rectal bleeding (not spotting), persistent tenderness or sharp severe abdominal pains, oral temperature over 100 degrees Fahrenheit, light-headedness or dizziness, or any other problems, contact your doctor.    **If unable to reach your doctor, please go to the [facility] Emergency Room**    - Your referring physician will receive a full report of your examination.  - If you do not hear from your doctor's office within two weeks of your biopsy, please call them for your results.    You may be able to see your laboratory results in [employer] between 4 and 7 business days.  In some cases, your physician may not have viewed the results before they are released to [employer].  If you have questions regarding your results contact the physician who ordered the test/exam by phone or via [employer] by choosing \"Ask a Medical Question.\"

## 2024-05-10 NOTE — H&P
History & Physical Examination    Patient Name: Caitlyn Anguiano  MRN: [id number]  CSN: [id number]  YOB: 1955    Diagnosis:   CRC screening  Hx colon polyps       Facility-Administered Medications Prior to Admission   Medication Dose Route Frequency Provider Last Rate Last Admin    [COMPLETED] triamcinolone acetonide (Kenalog-40) 40 MG/ML injection 40 mg  40 mg Intra-articular Once Gonzalez, Yogen Y, DO   40 mg at 03/28/24 1552    [COMPLETED] lidocaine (Xylocaine) 1 % injection  7 mL Intra-articular Once Gonzalez, Yogen Y, DO   7 mL at 03/28/24 1552     Medications Prior to Admission   Medication Sig Dispense Refill Last Dose    cyclobenzaprine 5 MG Oral Tab Take 1 tablet (5 mg total) by mouth nightly as needed for Muscle spasms. 20 tablet 0 couple weeks ago    buPROPion  MG Oral Tablet 12 Hr Take 1 tablet (150 mg total) by mouth 2 (two) times daily. 180 tablet 3 5/7/2024    losartan 100 MG Oral Tab Take 1 tablet (100 mg total) by mouth daily. 90 tablet 3 5/10/2024     Current Facility-Administered Medications   Medication Dose Route Frequency    lactated ringers infusion   Intravenous Continuous       Allergies: No Known Allergies    Past Medical History:    Cancer (HCC)    melanoma    Colon polyps    Essential hypertension    High blood pressure    Lymph edema    left foot/leg    Osteoarthritis    OTHER DISEASES    personal hx of melanoma to left foot-big toe    Personal history of antineoplastic chemotherapy    PONV (postoperative nausea and vomiting)     Past Surgical History:   Procedure Laterality Date    Colonoscopy      Colonoscopy N/A 3/12/2018    Procedure: COLONOSCOPY;  Surgeon: Marvel Welch MD;  Location: [facility] ENDOSCOPY    Colonoscopy & polypectomy  2002    at [facility]    Lip surgery proc unlisted Right 2010    Basal cell removal on right side of upper lip    Other Left     Left great toe removed/ chemotherapy    Splenectomy      Tonsillectomy       Family History   Problem  Relation Age of Onset    Heart Disease Mother         CAD    Diabetes Father     Heart Attack Father         myocardial infarction x2    Heart Disease Brother 64    Diabetes Brother 68    Heart Disease Brother     Colon Cancer Brother 64     Social History     Tobacco Use    Smoking status: Former     Current packs/day: 0.00     Types: Cigarettes     Quit date: 1970     Years since quittin.2    Smokeless tobacco: Never    Tobacco comments:     Pt state that she is under a lot of stress. Pt state that she has cut down a lot   Substance Use Topics    Alcohol use: No       SYSTEM Check if Review is Normal Check if Physical Exam is Normal If not normal, please explain:   HEENT [x ] [ x]    NECK & BACK [x ] [x ]    HEART [x ] [ x]    LUNGS [x ] [ x]    ABDOMEN [x ] [x ]    UROGENITAL [ ] [ ]    EXTREMITIES [x ] [x ]    OTHER        [ x ] I have discussed the risks and benefits and alternatives with the patient/family.  They understand and agree to proceed with plan of care.  [ x ] I have reviewed the History and Physical done within the last 30 days.  Any changes noted above.    Marvel Welch MD  5/10/2024  1:18 PM

## 2024-05-10 NOTE — ANESTHESIA POSTPROCEDURE EVALUATION
Patient: Caitlyn Anguiano    Procedure Summary       Date: 05/10/24 Room / Location: [facility] ENDOSCOPY 01 / [facility] ENDOSCOPY    Anesthesia Start: 1328 Anesthesia Stop: 1406    Procedure: COLONOSCOPY Diagnosis:       Colon cancer screening      Hx of colonic polyp      (Colon polyps, diverticulosis)    Surgeons: Marvel Welch MD Anesthesiologist: Yadira Hyde CRNA    Anesthesia Type: general ASA Status: 2            Anesthesia Type: general    Vitals Value Taken Time   /68 05/10/24 1405   Temp 97.2 °F (36.2 °C) 05/10/24 1405   Pulse 73 05/10/24 1405   Resp 16 05/10/24 1405   SpO2 100 % 05/10/24 1405       [facility] AN Post Evaluation:   Patient Evaluated in PACU  Patient Participation: complete - patient participated  Level of Consciousness: awake and alert  Pain Score: 0  Pain Management: adequate  Airway Patency:patent  Dental exam unchanged from preop  Yes    Cardiovascular Status: acceptable  Respiratory Status: acceptable  Postoperative Hydration acceptable    Yadira Hyde CRNA  5/10/2024 2:06 PM

## 2024-05-10 NOTE — ANESTHESIA PROCEDURE NOTES
Peripheral IV  Date/Time: 5/10/2024 1:34 PM  Inserted by: Yadira Hyde, TAO    Placement  Needle size: 22 G  Laterality: right  Location: hand  Local anesthetic: none  Site prep: alcohol  Technique: anatomical landmarks  Attempts: 2

## 2024-05-13 RX ORDER — NALOXONE HYDROCHLORIDE 0.4 MG/ML
0.08 INJECTION, SOLUTION INTRAMUSCULAR; INTRAVENOUS; SUBCUTANEOUS ONCE AS NEEDED
Status: ACTIVE | OUTPATIENT
Start: 2024-05-13 | End: 2024-05-13

## 2024-05-13 RX ORDER — SODIUM CHLORIDE, SODIUM LACTATE, POTASSIUM CHLORIDE, CALCIUM CHLORIDE 600; 310; 30; 20 MG/100ML; MG/100ML; MG/100ML; MG/100ML
INJECTION, SOLUTION INTRAVENOUS CONTINUOUS
Status: ACTIVE | OUTPATIENT
Start: 2024-05-13

## 2024-05-14 ENCOUNTER — TELEPHONE (OUTPATIENT)
Facility: CLINIC | Age: 69
End: 2024-05-14

## 2024-05-14 NOTE — TELEPHONE ENCOUNTER
Called pt and verified name and . Went over test results, pt verbalized understanding and educated on high fiber diet for internal hemorrhoids and diverticulosis.Last colonoscopy done by Dr Welch on 5/10/24. Recall completed to be done in 3 years update in health maintenance. Pt outreach complete.

## 2024-05-14 NOTE — TELEPHONE ENCOUNTER
----- Message from Marvel Welch sent at 5/13/2024 12:04 PM CDT -----  I wanted to get back to you with your colonoscopy results.  You had 5 colon polyps removed which were benign.  I would advise a repeat colonoscopy in 3 years to make sure no new polyps are forming.      You also have internal hemorrhoids and diverticulosis.  Please stay on a high fiber diet and call with any questions.

## 2024-05-17 ENCOUNTER — MED REC SCAN ONLY (OUTPATIENT)
Facility: CLINIC | Age: 69
End: 2024-05-17

## 2024-06-03 NOTE — TELEPHONE ENCOUNTER
Please Review. Protocol Failed; No Protocol   BP Readings from Last 1 Encounters:   05/10/24 (!) 168/83         Requested Prescriptions   Pending Prescriptions Disp Refills    LOSARTAN 100 MG Oral Tab [Pharmacy Med Name: LOSARTAN POTASSIUM 100 MG TAB] 90 tablet 3     Sig: TAKE 1 TABLET BY MOUTH EVERY DAY       Hypertension Medications Protocol Failed - 5/30/2024 12:47 AM        Failed - Last BP reading less than 140/90     BP Readings from Last 1 Encounters:   05/10/24 (!) 168/83               Passed - CMP or BMP in past 12 months        Passed - In person appointment or virtual visit in the past 12 mos or appointment in next 3 mos     Recent Outpatient Visits              1 month ago Vitamin D deficiency    [facility], [address], [place] Anabella Olivarez MD    Office Visit    1 month ago [facility]    [facility]    1 month ago     [facility] in [place] Espinal, Melissa, PT    Office Visit    2 months ago     [facility] in [place] Espinal, Melissa, PT    Office Visit    2 months ago Primary osteoarthritis of right knee    [facility], [address], [place] Marguerite Gonzalez DO    Office Visit                      Passed - EGFRCR or GFRNAA > 50     GFR Evaluation  EGFRCR: 75 , resulted on 4/8/2024                   Recent Outpatient Visits              1 month ago Vitamin D deficiency    [facility], [address], [place] Anabella Olivarez MD    Office Visit    1 month ago Primary osteoarthritis of right knee    [facility], [address], [place] Montse Bedoya MD    Office Visit    1 month ago     [facility] in [place] Espinal, Melissa, PT    Office Visit    2 months ago     [facility] in [place] Espinal, Melissa, PT    Office Visit    2 months ago Primary osteoarthritis of right knee     [facility], [address], [place] Marguerite Gonzalez, DO    Office Visit

## 2024-06-04 RX ORDER — LOSARTAN POTASSIUM 100 MG/1
100 TABLET ORAL DAILY
Qty: 90 TABLET | Refills: 3 | Status: SHIPPED | OUTPATIENT
Start: 2024-06-04

## 2024-06-11 RX ORDER — CYCLOBENZAPRINE HCL 5 MG
5 TABLET ORAL NIGHTLY PRN
Qty: 20 TABLET | Refills: 0 | OUTPATIENT
Start: 2024-06-11

## 2024-06-11 NOTE — TELEPHONE ENCOUNTER
Please Review. Protocol Failed; No Protocol     Requested Prescriptions   Pending Prescriptions Disp Refills    CYCLOBENZAPRINE 5 MG Oral Tab [Pharmacy Med Name: CYCLOBENZAPRINE 5 MG TABLET] 20 tablet 0     Sig: TAKE 1 TABLET BY MOUTH NIGHTLY AS NEEDED FOR MUSCLE SPASMS       There is no refill protocol information for this order              Recent Outpatient Visits              2 months ago Vitamin D deficiency    [facility], [address], [place] Anabella Olivarez MD    Office Visit    2 months ago Primary osteoarthritis of right knee    [facility], [address], [place] Montse Bedoya MD    Office Visit    2 months ago     [facility] in [place] Espinal, Melissa, PT    Office Visit    2 months ago     [facility] in [place] Espinal, Melissa, PT    Office Visit    2 months ago Primary osteoarthritis of right knee    [facility], [address], [place] Marguerite Gonzalez DO    Office Visit

## 2024-07-30 RX ORDER — BUPROPION HYDROCHLORIDE 150 MG/1
150 TABLET, EXTENDED RELEASE ORAL 2 TIMES DAILY
Qty: 180 TABLET | Refills: 3 | Status: SHIPPED | OUTPATIENT
Start: 2024-07-30

## 2024-07-30 NOTE — TELEPHONE ENCOUNTER
Refill passed per [facility] protocol.  Requested Prescriptions   Pending Prescriptions Disp Refills    BUPROPION  MG Oral Tablet 12 Hr [Pharmacy Med Name: BUPROPION HCL  MG TABLET] 180 tablet 3     Sig: TAKE 1 TABLET BY MOUTH TWICE A DAY       Psychiatric Non-Scheduled (Anti-Anxiety) Passed - 7/26/2024 12:05 PM        Passed - In person appointment or virtual visit in the past 6 mos or appointment in next 3 mos     Recent Outpatient Visits              3 months ago Vitamin D deficiency    [facility], [address], [place] Anabella Olivarez MD    Office Visit    3 months ago [facility] right [facility]    [facility]    3 months ago     [facility] in [place] Espinal, Melissa, PT    Office Visit    3 months ago     [facility] in [place] Espinal, Melissa, PT    Office Visit    4 months ago Primary osteoarthritis of right knee    [facility], [address], [place] Marguerite Gonzalez, DO    Office Visit                      Passed - Depression Screening completed within the past 12 months           Recent Outpatient Visits              3 months ago Vitamin D deficiency    [facility], [address], [place] Anabella Olivarez MD    Office Visit    3 months ago Primary osteoarthritis of right knee    [facility], [address], [place] Montse Bedoya MD    Office Visit    3 months ago     [facility] in [place] Espinal, Melissa, PT    Office Visit    3 months ago     [facility] in [place] Espinal, Melissa, PT    Office Visit    4 months ago Primary osteoarthritis of right knee    [facility], [address], [place] Marguerite Gonzalez, DO    Office Visit

## 2024-07-31 DIAGNOSIS — G89.29 CHRONIC PAIN OF RIGHT KNEE: ICD-10-CM

## 2024-07-31 DIAGNOSIS — M25.462 EFFUSION OF LEFT KNEE: ICD-10-CM

## 2024-07-31 DIAGNOSIS — M17.11 PRIMARY OSTEOARTHRITIS OF RIGHT KNEE: ICD-10-CM

## 2024-07-31 DIAGNOSIS — M25.561 CHRONIC PAIN OF RIGHT KNEE: ICD-10-CM

## 2024-07-31 NOTE — TELEPHONE ENCOUNTER
Meloxicam 7.5 mg    Last OV: 04/12/24  Last refill date: 1714/23  PCP  #/refills: 30/1  Upcoming appt: No future appointments.    04/08/24  BUN  9 - 23 mg/dL 17   Creatinine  0.55 - 1.02 mg/dL 0.84   BUN/CREA Ratio  10.0 - 20.0 20.2 High      eGFR-Cr  >=60 mL/min/1.73m2 75

## 2024-08-01 RX ORDER — MELOXICAM 7.5 MG/1
7.5 TABLET ORAL DAILY
Qty: 30 TABLET | Refills: 1 | Status: SHIPPED | OUTPATIENT
Start: 2024-08-01

## 2024-09-04 ENCOUNTER — OFFICE VISIT (OUTPATIENT)
Dept: OTHER | Facility: HOSPITAL | Age: 69
End: 2024-09-04
Attending: EMERGENCY MEDICINE

## 2024-09-04 DIAGNOSIS — Z00.00 ROUTINE GENERAL MEDICAL EXAMINATION AT A HEALTH CARE FACILITY: Primary | ICD-10-CM

## 2024-09-04 PROCEDURE — 86480 TB TEST CELL IMMUN MEASURE: CPT

## 2024-09-06 LAB
M TB IFN-G CD4+ T-CELLS BLD-ACNC: 0 IU/ML
M TB TUBERC IFN-G BLD QL: NEGATIVE
M TB TUBERC IGNF/MITOGEN IGNF CONTROL: >10 IU/ML
QFT TB1 AG MINUS NIL: 0.02 IU/ML
QFT TB2 AG MINUS NIL: 0.02 IU/ML

## 2024-09-09 ENCOUNTER — PATIENT MESSAGE (OUTPATIENT)
Dept: ORTHOPEDICS CLINIC | Facility: CLINIC | Age: 69
End: 2024-09-09

## 2024-09-09 DIAGNOSIS — M17.11 PRIMARY OSTEOARTHRITIS OF RIGHT KNEE: Primary | ICD-10-CM

## 2024-09-09 DIAGNOSIS — M17.12 PRIMARY OSTEOARTHRITIS OF LEFT KNEE: ICD-10-CM

## 2024-09-09 NOTE — TELEPHONE ENCOUNTER
Pt was last seen with you 4/8/24    \"She recently underwent an aspiration with injection to this knee by Dr. Marguerite Gonzalez which has resulted in notable symptomatic relief. She inquires about the option for a gel cushioning injection but this should not be administered given her injection about 10 days ago. She also should be free of significant effusion to improve the chances of effectiveness from viscosupplementation. I told her that the gel injection comprises just a few milliliters of hyaluronic acid which can be diluted by significant effusions. She will therefore continue with conservative care including activity protection, compression, icing, and anti-inflammatories as needed. Brochure for Monovisc viscosupplementation was provided. If symptoms return and she has no significant swelling or effusion this would be a reasonable treatment option. The alternative may include valgus  bracing of versus total knee arthroplasty but she would like to avoid surgery if at all possible. \"    Would you like pt to make appt with you or Dina

## 2024-09-09 NOTE — TELEPHONE ENCOUNTER
From: Caitlyn Anguiano  To: Montse Bedoya  Sent: 9/9/2024 9:56 AM CDT  Subject: knee pain matinence    Followed Dr's advice lost 30 lbs. I wear a compression sleeve. Use voltaren daily. My pain level is never more than 8. Can walk a mile and half with no pain increase. Should I try a gel injection now? Pls. advise

## 2024-09-10 NOTE — TELEPHONE ENCOUNTER
Dina Hill PA-C   to [facility] Clinical Pool       9/10/24  1:14 PM  If swelling has improved, it would be reasonable for her to consider the gel injection.  We can either order it or I am happy to discuss options with her in office.  Thank you!

## 2024-09-12 ENCOUNTER — TELEPHONE (OUTPATIENT)
Dept: ORTHOPEDICS CLINIC | Facility: CLINIC | Age: 69
End: 2024-09-12

## 2024-09-16 ENCOUNTER — OFFICE VISIT (OUTPATIENT)
Dept: ORTHOPEDICS CLINIC | Facility: CLINIC | Age: 69
End: 2024-09-16
Payer: MEDICARE

## 2024-09-16 VITALS — BODY MASS INDEX: 30.4 KG/M2 | HEIGHT: 61 IN | WEIGHT: 161 LBS

## 2024-09-16 DIAGNOSIS — M17.11 PRIMARY OSTEOARTHRITIS OF RIGHT KNEE: Primary | ICD-10-CM

## 2024-09-16 PROCEDURE — 99213 OFFICE O/P EST LOW 20 MIN: CPT | Performed by: ORTHOPAEDIC SURGERY

## 2024-09-16 NOTE — TELEPHONE ENCOUNTER
Patient authorized visco to be scheduled:    DOS: 09/16/24  PROVIDER: GALINA  MEDICATION: OCTAVIANO   OFFICE LOCATION: [facility]   PULLED:09/16/24  LABELED:09/16/24  PLACED: 09/16/24-[facility] CABINET

## 2024-09-16 NOTE — PROGRESS NOTES
[facility] Note    Chief Complaint   Patient presents with    Knee Pain     RIGHT KNEE DUROLANE INJECTION     HPI: The patient is a 69 year old female returning for orthopedic consultation due to chronic intermittent right knee pain.  She has a history of undergoing arthroscopy in December of 2022 after which she was notable improvement.  She has since been treated with knee aspiration with injection which has resulted in significant symptomatic relief.  At her last visit she was interested in proceeding with a intra-articular viscosupplement due to chronic symptoms.  A compression sleeve has also been employed.  No new catching, locking, instability, redness or warmth is reported.  At this point she relates that her pain is dramatically improved with weight loss, activity protection, light compression and independent home exercise.    Past Medical History:    Cancer (HCC)    melanoma    Colon polyps    Essential hypertension    High blood pressure    Lymph edema    left foot/leg    Osteoarthritis    OTHER DISEASES    personal hx of melanoma to left foot-big toe    Personal history of antineoplastic chemotherapy    PONV (postoperative nausea and vomiting)     Past Surgical History:   Procedure Laterality Date    Colonoscopy      Colonoscopy N/A 03/12/2018    Procedure: COLONOSCOPY;  Surgeon: Marvel Welch MD;  Location: [facility] ENDOSCOPY    Colonoscopy N/A 05/10/2024    Dr. welch; Colon polyps, diverticulosis    Colonoscopy N/A 5/10/2024    Procedure: COLONOSCOPY;  Surgeon: Marvel Welch MD;  Location: [facility] ENDOSCOPY    Colonoscopy & polypectomy  2002    at [facility]    Lip surgery proc unlisted Right 2010    Basal cell removal on right side of upper lip    Other Left     Left great toe removed/ chemotherapy    Splenectomy      Tonsillectomy       Current Outpatient Medications   Medication Sig Dispense Refill    buPROPion  MG Oral Tablet 12 Hr Take 1 tablet (150 mg total) by mouth 2  (two) times daily. 180 tablet 3    losartan 100 MG Oral Tab Take 1 tablet (100 mg total) by mouth daily. 90 tablet 3    MELOXICAM 7.5 MG Oral Tab TAKE 1 TABLET (7.5 MG TOTAL) BY MOUTH DAILY WITH FOOD 30 tablet 1    cyclobenzaprine 5 MG Oral Tab Take 1 tablet (5 mg total) by mouth nightly as needed for Muscle spasms. 20 tablet 0     No Known Allergies  Family History   Problem Relation Age of Onset    Heart Disease Mother         CAD    Diabetes Father     Heart Attack Father         myocardial infarction x2    Heart Disease Brother 64    Diabetes Brother 68    Heart Disease Brother     Colon Cancer Brother 64     Social History     Occupational History    Not on file   Tobacco Use    Smoking status: Former     Current packs/day: 0.00     Types: Cigarettes     Quit date: 1970     Years since quittin.6    Smokeless tobacco: Never    Tobacco comments:     Pt state that she is under a lot of stress. Pt state that she has cut down a lot   Vaping Use    Vaping status: Never Used   Substance and Sexual Activity    Alcohol use: No    Drug use: Not Currently     Types: Cannabis     Comment: Monthly - once    Sexual activity: Yes     Partners: Female        ROS:  Complete ROS reviewed by me and non-contributory to the chief complaint except as mentioned above.    Physical Exam:    Ht 5' 1\" (1.549 m)   Wt 161 lb (73 kg)   BMI 30.42 kg/m²   Right knee:  Inspection reveals no significant discoloration but moderate valgus deformity.  Palpation reveals minimal residual effusion.  Range of motion is adequate with trace flexion contracture and adequate flexion to at least 115 degrees.  Lateral joint line is tender to palpation.  Collaterals are stable on varus valgus stress with partially correctable valgus.  Lachman and posterior drawer are negative.  Popliteal space is nontender.  No significant distal edema is noted.  Neurovascular status is intact on sensory, motor and perfusion assessment distally.  Gait is not  antalgic with trace valgus thrust.    Assessment/Diagnoses:  Diagnoses and all orders for this visit:    Primary osteoarthritis of right knee      Plan: Overall the patient has improved significantly since her last visit.  She was interested in proceeding with an intra-articular viscosupplement injection which has subsequently been approved.  She inquired as to whether this would be helpful given that she has good function, minimal pain and no swelling.  Although her diagnosis of degenerative joint disease of the right knee is not likely to improve over time, we both agree that there is no need for intervention at this point if she is minimally symptomatic.  There are some finite risks associated with any procedure and given that she is satisfied with her functional levels and pain control, she may follow-up with us if needed.  She understands the nature and risks of viscosupplementation and may pursue this option in the future if her symptoms flareup.  I encouraged her to continue on her journey with weight loss, activity protection and light range of motion exercises to keep her flexibility and function.  All questions were answered and she verbalized understanding and appreciation.  Follow-up with us if needed.      Montse Bedoya MD, FAAOS  Sports Medicine/Knee and Shoulder  [facility]  Phone [phone]  Fax [phone]      This document was partially prepared using [employer] voice recognition software.  Although every attempt is made to correct errors during dictation, discrepancies may still exist.

## 2024-10-22 ENCOUNTER — HOSPITAL ENCOUNTER (EMERGENCY)
Facility: HOSPITAL | Age: 69
Discharge: HOME | End: 2024-10-22
Attending: EMERGENCY MEDICINE
Payer: MEDICARE

## 2024-10-22 ENCOUNTER — HOSPITAL ENCOUNTER (OUTPATIENT)
Age: 69
Discharge: ANOTHER HOSPITAL | End: 2024-10-22
Attending: EMERGENCY MEDICINE
Payer: MEDICARE

## 2024-10-22 ENCOUNTER — APPOINTMENT (OUTPATIENT)
Dept: CT IMAGING | Facility: HOSPITAL | Age: 69
End: 2024-10-22
Attending: EMERGENCY MEDICINE
Payer: MEDICARE

## 2024-10-22 VITALS
BODY MASS INDEX: 29.26 KG/M2 | RESPIRATION RATE: 18 BRPM | HEART RATE: 90 BPM | DIASTOLIC BLOOD PRESSURE: 84 MMHG | OXYGEN SATURATION: 99 % | HEIGHT: 61.5 IN | TEMPERATURE: 98 F | WEIGHT: 157 LBS | SYSTOLIC BLOOD PRESSURE: 156 MMHG

## 2024-10-22 VITALS
SYSTOLIC BLOOD PRESSURE: 153 MMHG | HEART RATE: 100 BPM | TEMPERATURE: 98 F | OXYGEN SATURATION: 100 % | RESPIRATION RATE: 18 BRPM | DIASTOLIC BLOOD PRESSURE: 91 MMHG

## 2024-10-22 DIAGNOSIS — R10.84 GENERALIZED ABDOMINAL PAIN: ICD-10-CM

## 2024-10-22 DIAGNOSIS — I10 ESSENTIAL HYPERTENSION: ICD-10-CM

## 2024-10-22 DIAGNOSIS — R10.9 ABDOMINAL PAIN, ACUTE: Primary | ICD-10-CM

## 2024-10-22 DIAGNOSIS — R11.2 NAUSEA AND VOMITING IN ADULT: Primary | ICD-10-CM

## 2024-10-22 LAB
ALBUMIN SERPL-MCNC: 4.9 G/DL (ref 3.2–4.8)
ALBUMIN/GLOB SERPL: 1.8 {RATIO} (ref 1–2)
ALP LIVER SERPL-CCNC: 75 U/L
ALT SERPL-CCNC: 26 U/L
ANION GAP SERPL CALC-SCNC: 9 MMOL/L (ref 0–18)
AST SERPL-CCNC: 22 U/L (ref ?–34)
BASOPHILS # BLD AUTO: 0.04 X10(3) UL (ref 0–0.2)
BASOPHILS NFR BLD AUTO: 0.3 %
BILIRUB SERPL-MCNC: 1 MG/DL (ref 0.2–1.1)
BILIRUB UR QL: NEGATIVE
BUN BLD-MCNC: 20 MG/DL (ref 9–23)
BUN/CREAT SERPL: 18.3 (ref 10–20)
CALCIUM BLD-MCNC: 10.8 MG/DL (ref 8.7–10.4)
CHLORIDE SERPL-SCNC: 98 MMOL/L (ref 98–112)
CO2 SERPL-SCNC: 30 MMOL/L (ref 21–32)
COLOR UR: YELLOW
CREAT BLD-MCNC: 1.09 MG/DL
DEPRECATED RDW RBC AUTO: 44.7 FL (ref 35.1–46.3)
EGFRCR SERPLBLD CKD-EPI 2021: 54 ML/MIN/1.73M2 (ref 60–?)
EOSINOPHIL # BLD AUTO: 0 X10(3) UL (ref 0–0.7)
EOSINOPHIL NFR BLD AUTO: 0 %
ERYTHROCYTE [DISTWIDTH] IN BLOOD BY AUTOMATED COUNT: 14.6 % (ref 11–15)
GLOBULIN PLAS-MCNC: 2.7 G/DL (ref 2–3.5)
GLUCOSE BLD-MCNC: 106 MG/DL (ref 70–99)
GLUCOSE UR-MCNC: 50 MG/DL
HCT VFR BLD AUTO: 46.2 %
HGB BLD-MCNC: 16.4 G/DL
HYALINE CASTS #/AREA URNS AUTO: PRESENT /LPF
IMM GRANULOCYTES # BLD AUTO: 0.05 X10(3) UL (ref 0–1)
IMM GRANULOCYTES NFR BLD: 0.4 %
LEUKOCYTE ESTERASE UR QL STRIP.AUTO: 250
LIPASE SERPL-CCNC: 43 U/L (ref 12–53)
LYMPHOCYTES # BLD AUTO: 1.71 X10(3) UL (ref 1–4)
LYMPHOCYTES NFR BLD AUTO: 12.2 %
MCH RBC QN AUTO: 29.9 PG (ref 26–34)
MCHC RBC AUTO-ENTMCNC: 35.5 G/DL (ref 31–37)
MCV RBC AUTO: 84.2 FL
MONOCYTES # BLD AUTO: 2.37 X10(3) UL (ref 0.1–1)
MONOCYTES NFR BLD AUTO: 16.9 %
NEUTROPHILS # BLD AUTO: 9.84 X10 (3) UL (ref 1.5–7.7)
NEUTROPHILS # BLD AUTO: 9.84 X10(3) UL (ref 1.5–7.7)
NEUTROPHILS NFR BLD AUTO: 70.2 %
NITRITE UR QL STRIP.AUTO: NEGATIVE
OSMOLALITY SERPL CALC.SUM OF ELEC: 287 MOSM/KG (ref 275–295)
PH UR: 6.5 [PH] (ref 5–8)
PLATELET # BLD AUTO: 377 10(3)UL (ref 150–450)
POTASSIUM SERPL-SCNC: 3.4 MMOL/L (ref 3.5–5.1)
PROT SERPL-MCNC: 7.6 G/DL (ref 5.7–8.2)
PROT UR-MCNC: 300 MG/DL
RBC # BLD AUTO: 5.49 X10(6)UL
RBC #/AREA URNS AUTO: >10 /HPF
SODIUM SERPL-SCNC: 137 MMOL/L (ref 136–145)
SP GR UR STRIP: 1.02 (ref 1–1.03)
UROBILINOGEN UR STRIP-ACNC: NORMAL
WBC # BLD AUTO: 14 X10(3) UL (ref 4–11)

## 2024-10-22 PROCEDURE — 96361 HYDRATE IV INFUSION ADD-ON: CPT

## 2024-10-22 PROCEDURE — 74177 CT ABD & PELVIS W/CONTRAST: CPT | Performed by: EMERGENCY MEDICINE

## 2024-10-22 PROCEDURE — 99284 EMERGENCY DEPT VISIT MOD MDM: CPT

## 2024-10-22 PROCEDURE — 85025 COMPLETE CBC W/AUTO DIFF WBC: CPT | Performed by: EMERGENCY MEDICINE

## 2024-10-22 PROCEDURE — 96374 THER/PROPH/DIAG INJ IV PUSH: CPT

## 2024-10-22 PROCEDURE — 85060 BLOOD SMEAR INTERPRETATION: CPT | Performed by: EMERGENCY MEDICINE

## 2024-10-22 PROCEDURE — 83690 ASSAY OF LIPASE: CPT | Performed by: EMERGENCY MEDICINE

## 2024-10-22 PROCEDURE — 80053 COMPREHEN METABOLIC PANEL: CPT | Performed by: EMERGENCY MEDICINE

## 2024-10-22 PROCEDURE — 99213 OFFICE O/P EST LOW 20 MIN: CPT

## 2024-10-22 PROCEDURE — 81001 URINALYSIS AUTO W/SCOPE: CPT | Performed by: EMERGENCY MEDICINE

## 2024-10-22 PROCEDURE — 87086 URINE CULTURE/COLONY COUNT: CPT | Performed by: EMERGENCY MEDICINE

## 2024-10-22 PROCEDURE — 80061 LIPID PANEL: CPT

## 2024-10-22 RX ORDER — ONDANSETRON 2 MG/ML
4 INJECTION INTRAMUSCULAR; INTRAVENOUS ONCE
Status: COMPLETED | OUTPATIENT
Start: 2024-10-22 | End: 2024-10-22

## 2024-10-22 RX ORDER — METOCLOPRAMIDE 10 MG/1
10 TABLET ORAL 3 TIMES DAILY PRN
Qty: 10 TABLET | Refills: 0 | Status: SHIPPED | OUTPATIENT
Start: 2024-10-22

## 2024-10-22 RX ORDER — ONDANSETRON 4 MG/1
4 TABLET, ORALLY DISINTEGRATING ORAL EVERY 4 HOURS PRN
Qty: 10 TABLET | Refills: 0 | Status: SHIPPED | OUTPATIENT
Start: 2024-10-22 | End: 2024-10-29

## 2024-10-22 NOTE — ED PROVIDER NOTES
Patient Seen in: [facility] [place]      History     Chief Complaint   Patient presents with    Vomiting     Stated Complaint: Nausea    Subjective:     69-year-old female presents today for evaluation of abdominal pain and persistent vomiting.  Patient reports over the past 3 days has been unable to keep anything down.  No diarrhea.  Complains of upper abdominal pain.  Still has her gallbladder.  Does not drink.  No cough runny nose or sore throat.  No chest pain or shortness of breath.  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    Objective:   Past Medical History:    Cancer (HCC)    melanoma    Colon polyps    Essential hypertension    High blood pressure    Lymph edema    left foot/leg    Osteoarthritis    OTHER DISEASES    personal hx of melanoma to left foot-big toe    Personal history of antineoplastic chemotherapy    PONV (postoperative nausea and vomiting)              Past Surgical History:   Procedure Laterality Date    Colonoscopy      Colonoscopy N/A 2018    Procedure: COLONOSCOPY;  Surgeon: Marvel Welch MD;  Location: [facility] ENDOSCOPY    Colonoscopy N/A 05/10/2024    Dr. welch; Colon polyps, diverticulosis    Colonoscopy N/A 5/10/2024    Procedure: COLONOSCOPY;  Surgeon: Marvel Welch MD;  Location: [facility] ENDOSCOPY    Colonoscopy & polypectomy      at [facility]    Lip surgery proc unlisted Right     Basal cell removal on right side of upper lip    Other Left     Left great toe removed/ chemotherapy    Splenectomy      Tonsillectomy                  Social History     Socioeconomic History    Marital status:    Tobacco Use    Smoking status: Former     Current packs/day: 0.00     Types: Cigarettes     Quit date: 1970     Years since quittin.7    Smokeless tobacco: Never    Tobacco comments:     Pt state that she is under a lot of stress. Pt state that she has cut down a lot   Vaping Use    Vaping status: Never Used   Substance and Sexual Activity     Alcohol use: No    Drug use: Not Currently     Types: Cannabis     Comment: Monthly - once    Sexual activity: Yes     Partners: Female   Other Topics Concern    Caffeine Concern Yes     Comment: coffee, 3 cups daily    Reaction to local anesthetic No                Physical Exam     ED Triage Vitals [10/22/24 1023]   BP (!) 153/91   Pulse 100   Resp 18   Temp 98.3 °F (36.8 °C)   Temp src Temporal   SpO2 100 %   O2 Device None (Room air)       Current:BP (!) 153/91   Pulse 100   Temp 98.3 °F (36.8 °C) (Temporal)   Resp 18   SpO2 100%         Physical Exam  Vitals reviewed.   Constitutional:       Appearance: Normal appearance. She is not toxic-appearing.   HENT:      Head: Normocephalic and atraumatic.      Mouth/Throat:      Mouth: Mucous membranes are moist.   Eyes:      Conjunctiva/sclera: Conjunctivae normal.   Cardiovascular:      Rate and Rhythm: Normal rate and regular rhythm.   Pulmonary:      Effort: Pulmonary effort is normal.      Breath sounds: Normal breath sounds.   Abdominal:      General: There is no distension.      Palpations: Abdomen is soft.      Tenderness: There is abdominal tenderness in the right upper quadrant and epigastric area.   Musculoskeletal:      Cervical back: Neck supple.   Skin:     General: Skin is warm and dry.   Neurological:      Mental Status: She is alert and oriented to person, place, and time.   Psychiatric:         Mood and Affect: Mood normal.         ED Course   Labs Reviewed - No data to display  Imaging:  No results found.                 MDM        69 year old female with epigastric right upper quadrant abdominal pain and vomiting.  Discussed with patient at length.  Understands limitations of our workup abilities here in the immediate care.  Will send to the emergency department for further evaluation.  Patient elects to go to [place].  Declines EMS transport.  Her daughter-in-law drove her.    Differential diagnosis (including but not limited  to):  Gastritis, pancreatitis, acute cholecystitis, bowel obstruction    ED course:  Pulse Ox: 100% on room air which is normal      Comment: Please note this report has been produced using speech recognition software and may contain errors related to that system including errors in grammar, punctuation, and spelling, as well as words and phrases that may be inappropriate. If there are any questions or concerns please feel free to contact the dictating provider for clarification.          Medical Decision Making      Disposition and Plan     Clinical Impression:  1. Abdominal pain, acute         Disposition:  Ic to ed  10/22/2024 10:41 am    Follow-up:  No follow-up provider specified.        Medications Prescribed:  Current Discharge Medication List              Supplementary Documentation:                                                     Gal Henry MD  10/22/2024  10:39 AM

## 2024-10-22 NOTE — ED PROVIDER NOTES
Patient Seen in: [facility] Emergency Department      History     Chief Complaint   Patient presents with    Abdomen/Flank Pain     Stated Complaint: Abdominal Pain    Subjective:   HPI      69-year-old female with history of hypertension, melanoma s/p resection and chemotherapy, status post splenectomy from an injury as a child, and lymphedema presents with complaints of nausea, vomiting, weight loss, and upper abdominal pain.  The patient reports onset of symptoms 3 days ago with nausea and vomiting.  She reports vomiting for 2 days each time she tried to eat or drink anything.  She has not vomited yesterday or today but states she has eaten and drank little.  She reports a 7 pound weight loss over the past 4 days.  She reports having a large, hard bowel movement the day of the onset of symptoms and has not had a bowel movement since then.  She complains of a burning and aching upper abdominal pain across her upper abdomen without radiation.  She denies dysuria or hematuria.  She denies cough or dyspnea.  No known fevers.  No other abdominal surgeries aside from the splenectomy.    Objective:     Past Medical History:    Cancer (HCC)    melanoma    Colon polyps    Essential hypertension    High blood pressure    Lymph edema    left foot/leg    Osteoarthritis    OTHER DISEASES    personal hx of melanoma to left foot-big toe    Personal history of antineoplastic chemotherapy    PONV (postoperative nausea and vomiting)              Past Surgical History:   Procedure Laterality Date    Colonoscopy      Colonoscopy N/A 03/12/2018    Procedure: COLONOSCOPY;  Surgeon: Marvel Welch MD;  Location: [facility] ENDOSCOPY    Colonoscopy N/A 05/10/2024    Dr. welch; Colon polyps, diverticulosis    Colonoscopy N/A 5/10/2024    Procedure: COLONOSCOPY;  Surgeon: Marvel Welch MD;  Location: [facility] ENDOSCOPY    Colonoscopy & polypectomy  2002    at [facility]    Lip surgery proc unlisted Right 2010    Basal cell  removal on right side of upper lip    Other Left     Left great toe removed/ chemotherapy    Splenectomy      Tonsillectomy                  Social History     Socioeconomic History    Marital status:    Tobacco Use    Smoking status: Former     Current packs/day: 0.00     Types: Cigarettes     Quit date: 1970     Years since quittin.7    Smokeless tobacco: Never    Tobacco comments:     Pt state that she is under a lot of stress. Pt state that she has cut down a lot   Vaping Use    Vaping status: Never Used   Substance and Sexual Activity    Alcohol use: No    Drug use: Not Currently     Types: Cannabis     Comment: Monthly - once    Sexual activity: Yes     Partners: Female   Other Topics Concern    Caffeine Concern Yes     Comment: coffee, 3 cups daily    Reaction to local anesthetic No                  Physical Exam     ED Triage Vitals [10/22/24 1101]   /84   Pulse 90   Resp 18   Temp 97.9 °F (36.6 °C)   Temp src Oral   SpO2 99 %   O2 Device None (Room air)       Current Vitals:   Vital Signs  BP: 156/84  Pulse: 90  Resp: 18  Temp: 97.9 °F (36.6 °C)  Temp src: Oral    Oxygen Therapy  SpO2: 99 %  O2 Device: None (Room air)        Physical Exam    General Appearance: alert, no distress  Eyes: pupils equal and round no pallor or injection  ENT, Mouth: mucous membranes moist  Respiratory: there are no retractions, lungs are clear to auscultation  Cardiovascular: regular rate and rhythm  Gastrointestinal:  abdomen is soft with tenderness across the upper abdomen including the right and left upper quadrants, no masses, bowel sounds normal  Neurological: Speech normal.  Moving extremities x 4.  Skin: warm and dry, no rashes.  Musculoskeletal: neck is supple non tender        Extremities are symmetrical, full range of motion  Psychiatric: patient is oriented X 3, there is no agitation    DIFFERENTIAL DIAGNOSIS: After history and physical exam differential diagnosis was considered for gastritis,  cholecystitis, bowel obstruction, or other        ED Course     Labs Reviewed   CBC WITH DIFFERENTIAL WITH PLATELET - Abnormal; Notable for the following components:       Result Value    WBC 14.0 (*)     RBC 5.49 (*)     HGB 16.4 (*)     Neutrophil Absolute Prelim 9.84 (*)     Neutrophil Absolute 9.84 (*)     Monocyte Absolute 2.37 (*)     All other components within normal limits   COMP METABOLIC PANEL (14) - Abnormal; Notable for the following components:    Glucose 106 (*)     Potassium 3.4 (*)     Creatinine 1.09 (*)     Calcium, Total 10.8 (*)     eGFR-Cr 54 (*)     Albumin 4.9 (*)     All other components within normal limits   URINALYSIS WITH CULTURE REFLEX - Abnormal; Notable for the following components:    Clarity Urine Turbid (*)     Glucose Urine 50 (*)     Ketones Urine Trace (*)     Blood Urine 1+ (*)     Protein Urine 300 (*)     Leukocyte Esterase Urine 250 (*)     WBC Urine 11-20 (*)     RBC Urine >10 (*)     Squamous Epi. Cells Few (*)     Hyaline Casts Present (*)     All other components within normal limits   LIPASE - Normal   SCAN SLIDE   MD BLOOD SMEAR CONSULT   RAINBOW DRAW LAVENDER   RAINBOW DRAW LIGHT GREEN   RAINBOW DRAW BLUE   RAINBOW DRAW GOLD   URINE CULTURE, ROUTINE                 MDM      Lab and CT results noted.  Patient tolerating p.o. fluids well without vomiting.  Suspect gastroenteritis.  Will discharge home with antiemetics for symptom relief.  She is advised to follow-up with her primary physician for reevaluation.  She is advised to return if repeated vomiting, increased abdominal pain, or other new symptoms develop.        Medical Decision Making      Disposition and Plan     Clinical Impression:  1. Nausea and vomiting in adult    2. Generalized abdominal pain         Disposition:  Discharge  10/22/2024  2:17 pm    Follow-up:  Anabella Olivarez MD  [address] [place] [address]  [phone]    Follow up      We recommend that you schedule follow up care with a  primary care provider within the next three months to obtain basic health screening including reassessment of your blood pressure.      Medications Prescribed:  Discharge Medication List as of 10/22/2024  2:24 PM        START taking these medications    Details   ondansetron 4 MG Oral Tablet Dispersible Take 1 tablet (4 mg total) by mouth every 4 (four) hours as needed for Nausea., Normal, Disp-10 tablet, R-0      metoclopramide 10 MG Oral Tab Take 1 tablet (10 mg total) by mouth 3 (three) times daily as needed (nausea, vomiting)., Normal, Disp-10 tablet, R-0                 Supplementary Documentation:

## 2024-10-22 NOTE — DISCHARGE INSTRUCTIONS
Take Zofran or Reglan as prescribed, as needed for nausea or vomiting.  Drink plenty of clear liquids.  Advance your diet slowly as discussed.  Return to the emergency department if increased abdominal pain, repeated vomiting, or other new symptoms develop.

## 2024-10-22 NOTE — ED INITIAL ASSESSMENT (HPI)
Presents for vomiting since Saturday. Patient was previously constipated, started vomiting over the weekend and has not improved. Did pass bowel movement. Denies diarrhea, fevers, sore throat, cough. Voiding normally. Denies abdominal pain and tenderness.

## 2024-10-22 NOTE — ED INITIAL ASSESSMENT (HPI)
Pt presents to the ER sent by [facility] for with c/o BUQ pain and n/v since Saturday.     Denies urinary and bowel symptoms.

## 2024-10-23 ENCOUNTER — OFFICE VISIT (OUTPATIENT)
Dept: INTERNAL MEDICINE CLINIC | Facility: CLINIC | Age: 69
End: 2024-10-23
Payer: MEDICARE

## 2024-10-23 DIAGNOSIS — R11.0 NAUSEA: ICD-10-CM

## 2024-10-23 DIAGNOSIS — I10 ESSENTIAL HYPERTENSION: ICD-10-CM

## 2024-10-23 DIAGNOSIS — E55.9 VITAMIN D DEFICIENCY: ICD-10-CM

## 2024-10-23 DIAGNOSIS — K21.9 GASTROESOPHAGEAL REFLUX DISEASE WITHOUT ESOPHAGITIS: Primary | ICD-10-CM

## 2024-10-23 DIAGNOSIS — R10.13 EPIGASTRIC PAIN: ICD-10-CM

## 2024-10-23 PROCEDURE — G2211 COMPLEX E/M VISIT ADD ON: HCPCS | Performed by: INTERNAL MEDICINE

## 2024-10-23 PROCEDURE — 99215 OFFICE O/P EST HI 40 MIN: CPT | Performed by: INTERNAL MEDICINE

## 2024-10-23 RX ORDER — SUCRALFATE ORAL 1 G/10ML
1 SUSPENSION ORAL
Qty: 414 ML | Refills: 0 | Status: SHIPPED | OUTPATIENT
Start: 2024-10-23

## 2024-10-23 RX ORDER — PANTOPRAZOLE SODIUM 40 MG/1
40 TABLET, DELAYED RELEASE ORAL
Qty: 180 TABLET | Refills: 0 | Status: SHIPPED | OUTPATIENT
Start: 2024-10-23

## 2024-10-23 NOTE — PROGRESS NOTES
HPI:    Patient ID: Caitlyn Anguiano is a 69 year old female.  Chief Complaint   Patient presents with    ER F/U     Seen at [facility] ER on 10/22/24 due to nausea.  Nausea since saturday     Patient in office today for  ER visit epigastric stomach pain , heartburns , burping nausea for 3-4 days  was in ER and  feels more heartburns now burping .  Pt state she does not have  mid or lower abdominal pain ,belly is soft ,  passing  vidya and  denies  diarrhea  has some constipation ,drinking    fluids and eating small meals .had big BM few days ago but not eating much now due to heartburns or nausea .  Patient denies cough shortness of breath Denies chest pain, spalpitations, denies UTI symptoms fever or chills.  Hypertension  This is a chronic problem. The current episode started more than 1 month ago. Pertinent negatives include no anxiety, blurred vision, chest pain, headaches, malaise/fatigue, neck pain, orthopnea, palpitations, peripheral edema, PND, shortness of breath or sweats. Risk factors for coronary artery disease include obesity and post-menopausal state. Past treatments include lifestyle changes and angiotensin blockers. The current treatment provides significant improvement.   Gastro-esophageal Reflux  She complains of abdominal pain (epigastric), belching, heartburn and nausea. She reports no chest pain, no coughing, no hoarse voice, no sore throat or no wheezing. This is a recurrent problem. The problem occurs frequently. The problem has been gradually worsening. Heartburn wakes from sleep: slept last night  well. The symptoms are aggravated by certain foods and caffeine. Risk factors include caffeine use. She has tried a diet change for the symptoms. The treatment provided mild relief.       Review of Systems   Constitutional:  Negative for chills, fever and malaise/fatigue.   HENT:  Negative for ear pain, hoarse voice and sore throat.    Eyes:  Negative for blurred vision, pain and redness.   Respiratory:   Negative for cough, shortness of breath and wheezing.    Cardiovascular:  Negative for chest pain, palpitations, orthopnea, leg swelling and PND.   Gastrointestinal:  Positive for abdominal pain (epigastric), constipation, heartburn and nausea. Negative for abdominal distention, anal bleeding, blood in stool, diarrhea, rectal pain and vomiting.             Genitourinary:  Negative for dysuria and frequency.   Musculoskeletal:  Negative for neck pain.   Skin:  Negative for pallor.   Neurological:  Negative for dizziness and headaches.        Patient denies any headaches today states only gets them very rarely only with stress mostly from work but goes away quickly with Tylenol and resting-patient denies any history of chronic headaches has not been evaluated in the past with them .   Psychiatric/Behavioral:  Negative for confusion. The patient is not nervous/anxious.             Current Outpatient Medications   Medication Sig Dispense Refill    pantoprazole 40 MG Oral Tab EC Take 1 tablet (40 mg total) by mouth 2 (two) times daily before meals. 1 tab in am  30 - 60  Minutes before meals breakfast and diner 180 tablet 0    sucralfate 1 GM/10ML Oral Suspension Take 10 mL (1 g total) by mouth 4 (four) times daily before meals and nightly. 414 mL 0    ondansetron 4 MG Oral Tablet Dispersible Take 1 tablet (4 mg total) by mouth every 4 (four) hours as needed for Nausea. 10 tablet 0    metoclopramide 10 MG Oral Tab Take 1 tablet (10 mg total) by mouth 3 (three) times daily as needed (nausea, vomiting). 10 tablet 0    buPROPion  MG Oral Tablet 12 Hr Take 1 tablet (150 mg total) by mouth 2 (two) times daily. 180 tablet 3    losartan 100 MG Oral Tab Take 1 tablet (100 mg total) by mouth daily. 90 tablet 3     Allergies:No Known Allergies   PHYSICAL EXAM:   Physical Exam  Vitals and nursing note reviewed.   Constitutional:       General: She is not in acute distress.     Appearance: She is well-developed.      Comments:  Obese    HENT:      Head: Normocephalic and atraumatic.   Eyes:      General: No scleral icterus.        Right eye: No discharge.         Left eye: No discharge.   Neck:      Thyroid: No thyromegaly.      Vascular: No JVD.   Cardiovascular:      Rate and Rhythm: Normal rate and regular rhythm.      Heart sounds: Normal heart sounds. No murmur heard.  Pulmonary:      Effort: Pulmonary effort is normal. No respiratory distress.      Breath sounds: Normal breath sounds. No wheezing or rales.   Abdominal:      General: Bowel sounds are normal. There is no distension.      Palpations: Abdomen is soft. There is no mass.      Tenderness: There is abdominal tenderness in the epigastric area. There is no right CVA tenderness, left CVA tenderness, guarding or rebound. Negative signs include Estrada's sign.   Musculoskeletal:      Cervical back: Neck supple.   Lymphadenopathy:      Cervical: No cervical adenopathy.   Skin:     General: Skin is warm and dry.   Neurological:      General: No focal deficit present.      Mental Status: She is alert and oriented to person, place, and time.   Psychiatric:         Behavior: Behavior normal.           Blood pressure 144/86, pulse 81, height 5' 1.5\" (1.562 m), weight 151 lb (68.5 kg), not currently breastfeeding.           ASSESSMENT/PLAN:     1.Gastroesophageal reflux disease without esophagitis  2. Nausea  3.Epigastric pain   Dehydration   Labs cbc cmp discussed with pt recheck labs before f/u visit   S/p  ER visit - gastroenteritis   Patient advised to avoid spicy food, coffee, tea, caffeinated drinks, alcohol, acidic food/juices  Advised to avoid NSAID's Aspirin-based medications  Advised to avoid wearing  tight clothes   Advised to elevate the head of the bed   Avoid eating at least 3 hours before bedtime   Counseling on ideal weight/BMI  Start Pantoprazole 40 mg  PPIs qd in the morning 30-60 minutes before breakfast and diner  always on empty stomach   Sucralfate  1   mg tid and  at night   Refer  to [facility]   Is  any persisting or worsening sy to go ER   Increase  fluids   Side effects and directions of medication discussed with patient. Patient verbalized understanding and compliance.    - Gastro Referral - In Network         Ct abdomen/ pelvis   Discussed with pt   1. A few dilated and fluid-filled loops of small bowel in the lower abdomen, which may reflect enteritis, ileus, or less likely a low-grade bowel obstruction.   2. Gastric fundal wall thickening, which may reflect gastritis.   3. Circumferential bladder wall thickening, which may be secondary to cystitis or underdistention.   4. No hydronephrosis or urinary calculus.   5. Focal ectasia of the infrarenal abdominal aorta measuring 2.4 cm in diameter.   6. Lesser incidental findings described above.        Essential hypertension  Take high blood pressure medication as perscribed   Low- sodium diet   Maintain walking - as tolerated   Track and record blood pressure and heart rate at home   The side effects of medication discussed with patient   Patient verbalizes understanding and compliance   patient to continue present medications  Losartan 100 mg daily    Labs discussed with patient  Advised patient to avoid taking any over-the-counter cold and sinus medication due to the fact that she has hypertension and and can elevate the blood pressure can use only Allegra plain or Claritin plain    Recheck bp at home   F/u in 2 -3  weeks                   Hx  Splenectomy   Pt  educaiton   Need prevnar 20  meningitis vaccine   Pt  Refused shots  now     F/u in 2-3 weeks       No orders of the defined types were placed in this encounter.      Meds This Visit:  Requested Prescriptions     Signed Prescriptions Disp Refills    pantoprazole 40 MG Oral Tab  tablet 0     Sig: Take 1 tablet (40 mg total) by mouth 2 (two) times daily before meals. 1 tab in am  30 - 60  Minutes before meals breakfast and diner    sucralfate 1 GM/10ML Oral  Suspension 414 mL 0     Sig: Take 10 mL (1 g total) by mouth 4 (four) times daily before meals and nightly.       Imaging & Referrals:  [facility] - INTERNAL       ID#[id number]

## 2024-10-24 VITALS
DIASTOLIC BLOOD PRESSURE: 86 MMHG | HEIGHT: 61.5 IN | HEART RATE: 81 BPM | SYSTOLIC BLOOD PRESSURE: 144 MMHG | BODY MASS INDEX: 28.14 KG/M2 | WEIGHT: 151 LBS

## 2024-10-24 PROBLEM — K21.9 GASTROESOPHAGEAL REFLUX DISEASE WITHOUT ESOPHAGITIS: Status: ACTIVE | Noted: 2024-10-24

## 2024-10-24 PROBLEM — R11.0 NAUSEA: Status: ACTIVE | Noted: 2024-10-24

## 2024-10-24 LAB
CHOLEST SERPL-MCNC: 250 MG/DL (ref ?–200)
HDLC SERPL-MCNC: 106 MG/DL (ref 40–59)
LDLC SERPL CALC-MCNC: 128 MG/DL (ref ?–100)
NONHDLC SERPL-MCNC: 144 MG/DL (ref ?–130)
TRIGL SERPL-MCNC: 95 MG/DL (ref 30–149)
VLDLC SERPL CALC-MCNC: 17 MG/DL (ref 0–30)

## 2024-10-28 ENCOUNTER — APPOINTMENT (OUTPATIENT)
Dept: GENERAL RADIOLOGY | Facility: HOSPITAL | Age: 69
End: 2024-10-28
Attending: EMERGENCY MEDICINE
Payer: MEDICARE

## 2024-10-28 ENCOUNTER — HOSPITAL ENCOUNTER (EMERGENCY)
Facility: HOSPITAL | Age: 69
Discharge: HOME | End: 2024-10-29
Attending: EMERGENCY MEDICINE
Payer: MEDICARE

## 2024-10-28 VITALS
BODY MASS INDEX: 28.32 KG/M2 | HEART RATE: 63 BPM | RESPIRATION RATE: 18 BRPM | HEIGHT: 61 IN | SYSTOLIC BLOOD PRESSURE: 128 MMHG | DIASTOLIC BLOOD PRESSURE: 65 MMHG | TEMPERATURE: 97 F | OXYGEN SATURATION: 97 % | WEIGHT: 150 LBS

## 2024-10-28 DIAGNOSIS — K59.00 CONSTIPATION, UNSPECIFIED CONSTIPATION TYPE: Primary | ICD-10-CM

## 2024-10-28 LAB
ALBUMIN SERPL-MCNC: 4.6 G/DL (ref 3.2–4.8)
ALBUMIN/GLOB SERPL: 1.8 {RATIO} (ref 1–2)
ALP LIVER SERPL-CCNC: 68 U/L
ALT SERPL-CCNC: 22 U/L
ANION GAP SERPL CALC-SCNC: 7 MMOL/L (ref 0–18)
AST SERPL-CCNC: 21 U/L (ref ?–34)
BASOPHILS # BLD AUTO: 0.05 X10(3) UL (ref 0–0.2)
BASOPHILS NFR BLD AUTO: 0.4 %
BILIRUB SERPL-MCNC: 0.6 MG/DL (ref 0.2–1.1)
BUN BLD-MCNC: 21 MG/DL (ref 9–23)
BUN/CREAT SERPL: 22.8 (ref 10–20)
CALCIUM BLD-MCNC: 10.2 MG/DL (ref 8.7–10.4)
CHLORIDE SERPL-SCNC: 101 MMOL/L (ref 98–112)
CO2 SERPL-SCNC: 31 MMOL/L (ref 21–32)
CREAT BLD-MCNC: 0.92 MG/DL
DEPRECATED RDW RBC AUTO: 44 FL (ref 35.1–46.3)
EGFRCR SERPLBLD CKD-EPI 2021: 67 ML/MIN/1.73M2 (ref 60–?)
EOSINOPHIL # BLD AUTO: 0.02 X10(3) UL (ref 0–0.7)
EOSINOPHIL NFR BLD AUTO: 0.2 %
ERYTHROCYTE [DISTWIDTH] IN BLOOD BY AUTOMATED COUNT: 14.2 % (ref 11–15)
GLOBULIN PLAS-MCNC: 2.5 G/DL (ref 2–3.5)
GLUCOSE BLD-MCNC: 105 MG/DL (ref 70–99)
HCT VFR BLD AUTO: 43.9 %
HGB BLD-MCNC: 15.1 G/DL
IMM GRANULOCYTES # BLD AUTO: 0.03 X10(3) UL (ref 0–1)
IMM GRANULOCYTES NFR BLD: 0.3 %
LIPASE SERPL-CCNC: 43 U/L (ref 12–53)
LYMPHOCYTES # BLD AUTO: 1.21 X10(3) UL (ref 1–4)
LYMPHOCYTES NFR BLD AUTO: 10.8 %
MCH RBC QN AUTO: 29.2 PG (ref 26–34)
MCHC RBC AUTO-ENTMCNC: 34.4 G/DL (ref 31–37)
MCV RBC AUTO: 84.7 FL
MONOCYTES # BLD AUTO: 1.17 X10(3) UL (ref 0.1–1)
MONOCYTES NFR BLD AUTO: 10.4 %
NEUTROPHILS # BLD AUTO: 8.72 X10 (3) UL (ref 1.5–7.7)
NEUTROPHILS # BLD AUTO: 8.72 X10(3) UL (ref 1.5–7.7)
NEUTROPHILS NFR BLD AUTO: 77.9 %
OSMOLALITY SERPL CALC.SUM OF ELEC: 291 MOSM/KG (ref 275–295)
PLATELET # BLD AUTO: 368 10(3)UL (ref 150–450)
POTASSIUM SERPL-SCNC: 3.3 MMOL/L (ref 3.5–5.1)
PROT SERPL-MCNC: 7.1 G/DL (ref 5.7–8.2)
RBC # BLD AUTO: 5.18 X10(6)UL
SODIUM SERPL-SCNC: 139 MMOL/L (ref 136–145)
WBC # BLD AUTO: 11.2 X10(3) UL (ref 4–11)

## 2024-10-28 PROCEDURE — 99284 EMERGENCY DEPT VISIT MOD MDM: CPT

## 2024-10-28 PROCEDURE — 85025 COMPLETE CBC W/AUTO DIFF WBC: CPT

## 2024-10-28 PROCEDURE — 74018 RADEX ABDOMEN 1 VIEW: CPT | Performed by: EMERGENCY MEDICINE

## 2024-10-28 PROCEDURE — 96374 THER/PROPH/DIAG INJ IV PUSH: CPT

## 2024-10-28 PROCEDURE — 80053 COMPREHEN METABOLIC PANEL: CPT | Performed by: EMERGENCY MEDICINE

## 2024-10-28 PROCEDURE — 96361 HYDRATE IV INFUSION ADD-ON: CPT

## 2024-10-28 PROCEDURE — 80053 COMPREHEN METABOLIC PANEL: CPT

## 2024-10-28 PROCEDURE — 99285 EMERGENCY DEPT VISIT HI MDM: CPT

## 2024-10-28 PROCEDURE — 85025 COMPLETE CBC W/AUTO DIFF WBC: CPT | Performed by: EMERGENCY MEDICINE

## 2024-10-28 PROCEDURE — 83690 ASSAY OF LIPASE: CPT | Performed by: EMERGENCY MEDICINE

## 2024-10-28 RX ORDER — ONDANSETRON 2 MG/ML
4 INJECTION INTRAMUSCULAR; INTRAVENOUS ONCE
Status: COMPLETED | OUTPATIENT
Start: 2024-10-28 | End: 2024-10-28

## 2024-10-29 NOTE — ED PROVIDER NOTES
Patient Seen in: [facility] Emergency Department    History     Chief Complaint   Patient presents with    Nausea    Constipation       HPI    69-year-old female presents ER with complaint of constipation and nausea.  Patient was seen in ER on 10/22.  Patient at that time complaint of nausea as well as abdominal discomfort.  CT abdomen pelvis was negative and sent home.  Patient was found to have constipation on CT at that time.  Patient states she has not had a good bowel movement since she had been seen on 10/22.  Patient requesting medication to help her have a bowel movement.  Patient states that she used a suppository which has helped \"a little.\"    History reviewed.   Past Medical History:    Cancer (HCC)    melanoma    Colon polyps    Essential hypertension    High blood pressure    Lymph edema    left foot/leg    Osteoarthritis    OTHER DISEASES    personal hx of melanoma to left foot-big toe    Personal history of antineoplastic chemotherapy    PONV (postoperative nausea and vomiting)       History reviewed.   Past Surgical History:   Procedure Laterality Date    Colonoscopy      Colonoscopy N/A 03/12/2018    Procedure: COLONOSCOPY;  Surgeon: Marvel Welch MD;  Location: [facility] ENDOSCOPY    Colonoscopy N/A 05/10/2024    Dr. welch; Colon polyps, diverticulosis    Colonoscopy N/A 5/10/2024    Procedure: COLONOSCOPY;  Surgeon: Marvel Welch MD;  Location: [facility] ENDOSCOPY    Colonoscopy & polypectomy  2002    at [facility]    Lip surgery proc unlisted Right 2010    Basal cell removal on right side of upper lip    Other Left     Left great toe removed/ chemotherapy    Splenectomy      Tonsillectomy           Medications :  Prescriptions Prior to Admission[1]     Family History   Problem Relation Age of Onset    Heart Disease Mother         CAD    Diabetes Father     Heart Attack Father         myocardial infarction x2    Heart Disease Brother 64    Diabetes Brother 68    Heart Disease Brother      Colon Cancer Brother 64       Smoking Status:   Social History     Socioeconomic History    Marital status:    Tobacco Use    Smoking status: Former     Current packs/day: 0.00     Types: Cigarettes     Quit date: 1970     Years since quittin.7    Smokeless tobacco: Never    Tobacco comments:     Pt state that she is under a lot of stress. Pt state that she has cut down a lot   Vaping Use    Vaping status: Never Used   Substance and Sexual Activity    Alcohol use: No    Drug use: Not Currently     Types: Cannabis     Comment: Monthly - once    Sexual activity: Yes     Partners: Female   Other Topics Concern    Caffeine Concern Yes     Comment: coffee, 3 cups daily    Reaction to local anesthetic No       ROS  All pertinent positives for the review of systems are mentioned in the HPI  All other organ systems are reviewed and are negative.    Constitutional and vital signs reviewed.      Social History and Family History elements reviewed from today, pertinent positives to the presenting problem noted.    Physical Exam     ED Triage Vitals [10/28/24 1939]   BP (!) 168/97   Pulse 78   Resp 18   Temp 97.1 °F (36.2 °C)   Temp src Oral   SpO2 99 %   O2 Device None (Room air)       All measures to prevent infection transmission during my interaction with the patient were taken. The patient was already wearing a droplet mask on my arrival to the room. Personal protective equipment including droplet mask, eye protection, and gloves were worn throughout the duration of the exam.  Handwashing was performed prior to and after the exam.  Stethoscope and any equipment used during my examination was cleaned with super sani-cloth germicidal wipes following the exam.     Physical Exam  Vitals and nursing note reviewed.   Constitutional:       Appearance: She is well-developed.   HENT:      Head: Normocephalic and atraumatic.      Right Ear: External ear normal.      Left Ear: External ear normal.      Nose: Nose  normal.   Eyes:      Conjunctiva/sclera: Conjunctivae normal.      Pupils: Pupils are equal, round, and reactive to light.   Cardiovascular:      Rate and Rhythm: Normal rate and regular rhythm.      Heart sounds: Normal heart sounds.   Pulmonary:      Effort: Pulmonary effort is normal.      Breath sounds: Normal breath sounds.   Abdominal:      General: Bowel sounds are normal.      Palpations: Abdomen is soft.      Tenderness: There is abdominal tenderness in the suprapubic area. There is no right CVA tenderness, left CVA tenderness, guarding or rebound.   Musculoskeletal:         General: Normal range of motion.      Cervical back: Normal range of motion and neck supple.   Skin:     General: Skin is warm and dry.   Neurological:      Mental Status: She is alert and oriented to person, place, and time.      Deep Tendon Reflexes: Reflexes are normal and symmetric.   Psychiatric:         Behavior: Behavior normal.         Thought Content: Thought content normal.         Judgment: Judgment normal.         ED Course        Labs Reviewed   CBC WITH DIFFERENTIAL WITH PLATELET - Abnormal; Notable for the following components:       Result Value    WBC 11.2 (*)     Neutrophil Absolute Prelim 8.72 (*)     Neutrophil Absolute 8.72 (*)     Monocyte Absolute 1.17 (*)     All other components within normal limits   COMP METABOLIC PANEL (14) - Abnormal; Notable for the following components:    Glucose 105 (*)     Potassium 3.3 (*)     BUN/CREA Ratio 22.8 (*)     All other components within normal limits   LIPASE - Normal   RAINBOW DRAW LAVENDER   RAINBOW DRAW LIGHT GREEN   RAINBOW DRAW BLUE   RAINBOW DRAW GOLD         Imaging Results Available and Reviewed while in ED: Abdominal radiographs(s)    COMPARISON: None.    IMPRESSION:  Nonobstructive bowel gas pattern.  No evidence of pneumoperitoneum on these limited supine radiographs.  Moderate colonic stool burden.    ED Medications Administered:   Medications   sodium chloride  0.9 % IV bolus 1,000 mL (1,000 mL Intravenous New Bag 10/28/24 2235)   ondansetron (Zofran) 4 MG/2ML injection 4 mg (4 mg Intravenous Given 10/28/24 2234)         MDM     Vitals:    10/28/24 1939 10/28/24 2300   BP: (!) 168/97 128/65   Pulse: 78 63   Resp: 18    Temp: 97.1 °F (36.2 °C)    TempSrc: Oral    SpO2: 99% 97%   Weight: 68 kg    Height: 154.9 cm (5' 1\")      *I personally reviewed and interpreted all ED vitals.  I also personally reviewed all labs and imaging if ordered    Pulse Ox: 99%, Room air, Normal     Monitor Interpretation:   normal sinus rhythm    Differential Diagnosis/ Diagnostic Considerations: Constipation, bowel obstruction, colitis, UTI    Medical Record Review: I personally reviewed available prior medical records for any recent pertinent discharge summaries, testing, and procedures and reviewed those reports.    Complicating Factors: The patient already has does not have any pertinent problems on file. to contribute to the complexity of this ED evaluation.    Medical Decision Making  69-year-old female presents ER with complaint of abdominal fullness and constipation.  Patient's blood work within normal limits.  Patient's KUB shows colonic constipation without any sign of obstruction.  Patient discharged home with 1 L of GoLytely and instructed increase fiber in her diet.  Patient's daughter bedside made aware of the discharge planning disposition.    Problems Addressed:  Constipation, unspecified constipation type: acute illness or injury    Amount and/or Complexity of Data Reviewed  Independent Historian:      Details: Patient's daughter bedside states that she had had decreased appetite and feels like she is dehydrated since she was discharged from the ER.  External Data Reviewed: radiology and notes.     Details: Previous imaging reviewed from 10/23 which shows moderate stool burden without any sign of obstruction.  Labs: ordered. Decision-making details documented in ED  Course.  Radiology: ordered and independent interpretation performed. Decision-making details documented in ED Course.     Details: KUB reviewed by myself shows constipation without any sign of obstruction.    Risk  Prescription drug management.        Condition upon leaving the department: Stable    Disposition and Plan     Clinical Impression:  1. Constipation, unspecified constipation type        Disposition:  Discharge    Follow-up:  Anabella Olivarez MD  [address] [place] [address]  [phone]    Schedule an appointment as soon as possible for a visit  If symptoms worsen      Medications Prescribed:  Current Discharge Medication List        START taking these medications    Details   polyethylene glycol, PEG 3350-KCl-NaBcb-NaCl-NaSulf, 236 g Oral Recon Soln Take 1,000 mL by mouth once for 1 dose.  Qty: 1000 mL, Refills: 0                    [1] (Not in a hospital admission)

## 2024-10-29 NOTE — ED INITIAL ASSESSMENT (HPI)
Pt arrives through triage with family      complaints of nausea/ vomiting and constipation. Pt reports being seen here on 10/22 for the same issue. Reports has not been feeling better

## 2024-11-08 ENCOUNTER — LAB ENCOUNTER (OUTPATIENT)
Dept: LAB | Age: 69
End: 2024-11-08
Attending: INTERNAL MEDICINE
Payer: MEDICARE

## 2024-11-08 DIAGNOSIS — E78.00 PURE HYPERCHOLESTEROLEMIA: ICD-10-CM

## 2024-11-08 DIAGNOSIS — R11.0 NAUSEA: ICD-10-CM

## 2024-11-08 DIAGNOSIS — R10.13 EPIGASTRIC PAIN: ICD-10-CM

## 2024-11-08 DIAGNOSIS — I10 ESSENTIAL HYPERTENSION: ICD-10-CM

## 2024-11-08 DIAGNOSIS — K21.9 GASTROESOPHAGEAL REFLUX DISEASE WITHOUT ESOPHAGITIS: ICD-10-CM

## 2024-11-08 DIAGNOSIS — E55.9 VITAMIN D DEFICIENCY: ICD-10-CM

## 2024-11-08 LAB
ALBUMIN SERPL-MCNC: 3.9 G/DL (ref 3.2–4.8)
ALBUMIN/GLOB SERPL: 1.6 {RATIO} (ref 1–2)
ALP LIVER SERPL-CCNC: 57 U/L
ALT SERPL-CCNC: 16 U/L
ANION GAP SERPL CALC-SCNC: 6 MMOL/L (ref 0–18)
AST SERPL-CCNC: 14 U/L (ref ?–34)
BASOPHILS # BLD AUTO: 0.09 X10(3) UL (ref 0–0.2)
BASOPHILS NFR BLD AUTO: 1.2 %
BILIRUB SERPL-MCNC: 0.6 MG/DL (ref 0.2–1.1)
BUN BLD-MCNC: 13 MG/DL (ref 9–23)
BUN/CREAT SERPL: 17.1 (ref 10–20)
CALCIUM BLD-MCNC: 9.7 MG/DL (ref 8.7–10.4)
CHLORIDE SERPL-SCNC: 108 MMOL/L (ref 98–112)
CHOLEST SERPL-MCNC: 196 MG/DL (ref ?–200)
CO2 SERPL-SCNC: 27 MMOL/L (ref 21–32)
CREAT BLD-MCNC: 0.76 MG/DL
DEPRECATED RDW RBC AUTO: 49.7 FL (ref 35.1–46.3)
EGFRCR SERPLBLD CKD-EPI 2021: 84 ML/MIN/1.73M2 (ref 60–?)
EOSINOPHIL # BLD AUTO: 0.18 X10(3) UL (ref 0–0.7)
EOSINOPHIL NFR BLD AUTO: 2.4 %
ERYTHROCYTE [DISTWIDTH] IN BLOOD BY AUTOMATED COUNT: 15.4 % (ref 11–15)
FASTING PATIENT LIPID ANSWER: YES
FASTING STATUS PATIENT QL REPORTED: YES
GLOBULIN PLAS-MCNC: 2.4 G/DL (ref 2–3.5)
GLUCOSE BLD-MCNC: 80 MG/DL (ref 70–99)
HCT VFR BLD AUTO: 35.5 %
HDLC SERPL-MCNC: 75 MG/DL (ref 40–59)
HGB BLD-MCNC: 12.3 G/DL
IMM GRANULOCYTES # BLD AUTO: 0.01 X10(3) UL (ref 0–1)
IMM GRANULOCYTES NFR BLD: 0.1 %
LDLC SERPL CALC-MCNC: 110 MG/DL (ref ?–100)
LYMPHOCYTES # BLD AUTO: 0.9 X10(3) UL (ref 1–4)
LYMPHOCYTES NFR BLD AUTO: 12.1 %
MCH RBC QN AUTO: 30.7 PG (ref 26–34)
MCHC RBC AUTO-ENTMCNC: 34.6 G/DL (ref 31–37)
MCV RBC AUTO: 88.5 FL
MONOCYTES # BLD AUTO: 1 X10(3) UL (ref 0.1–1)
MONOCYTES NFR BLD AUTO: 13.5 %
NEUTROPHILS # BLD AUTO: 5.23 X10 (3) UL (ref 1.5–7.7)
NEUTROPHILS # BLD AUTO: 5.23 X10(3) UL (ref 1.5–7.7)
NEUTROPHILS NFR BLD AUTO: 70.7 %
NONHDLC SERPL-MCNC: 121 MG/DL (ref ?–130)
OSMOLALITY SERPL CALC.SUM OF ELEC: 291 MOSM/KG (ref 275–295)
PLATELET # BLD AUTO: 313 10(3)UL (ref 150–450)
POTASSIUM SERPL-SCNC: 4.3 MMOL/L (ref 3.5–5.1)
PROT SERPL-MCNC: 6.3 G/DL (ref 5.7–8.2)
RBC # BLD AUTO: 4.01 X10(6)UL
SODIUM SERPL-SCNC: 141 MMOL/L (ref 136–145)
TRIGL SERPL-MCNC: 58 MG/DL (ref 30–149)
VIT D+METAB SERPL-MCNC: 33.7 NG/ML (ref 30–100)
VLDLC SERPL CALC-MCNC: 10 MG/DL (ref 0–30)
WBC # BLD AUTO: 7.4 X10(3) UL (ref 4–11)

## 2024-11-08 PROCEDURE — 80061 LIPID PANEL: CPT

## 2024-11-08 PROCEDURE — 85025 COMPLETE CBC W/AUTO DIFF WBC: CPT

## 2024-11-08 PROCEDURE — 80053 COMPREHEN METABOLIC PANEL: CPT

## 2024-11-08 PROCEDURE — 36415 COLL VENOUS BLD VENIPUNCTURE: CPT

## 2024-11-08 PROCEDURE — 82306 VITAMIN D 25 HYDROXY: CPT

## 2025-01-19 DIAGNOSIS — K21.9 GASTROESOPHAGEAL REFLUX DISEASE WITHOUT ESOPHAGITIS: ICD-10-CM

## 2025-01-19 DIAGNOSIS — R11.0 NAUSEA: ICD-10-CM

## 2025-01-22 RX ORDER — PANTOPRAZOLE SODIUM 40 MG/1
40 TABLET, DELAYED RELEASE ORAL
Qty: 180 TABLET | Refills: 0 | OUTPATIENT
Start: 2025-01-22

## 2025-01-22 NOTE — TELEPHONE ENCOUNTER
Per Last office visit 10/24/24. Patient was supposed to follow up in 2-3 weeks. Patient also referred to gastroenterology. No appointments schedule. Routed to physician for direction on refill.

## 2025-04-10 RX ORDER — LOSARTAN POTASSIUM 100 MG/1
100 TABLET ORAL DAILY
Qty: 90 TABLET | Refills: 3 | Status: SHIPPED | OUTPATIENT
Start: 2025-04-10

## 2025-04-10 NOTE — TELEPHONE ENCOUNTER
Refill Per Protocol     Requested Prescriptions   Pending Prescriptions Disp Refills    LOSARTAN 100 MG Oral Tab [Pharmacy Med Name: LOSARTAN POTASSIUM 100 MG TAB] 90 tablet 3     Sig: TAKE 1 TABLET BY MOUTH EVERY DAY       Hypertension Medications Protocol Passed - 4/10/2025  4:18 PM        Passed - CMP or BMP in past 12 months        Passed - Last BP reading less than 140/90     BP Readings from Last 1 Encounters:   10/28/24 128/65               Passed - In person appointment or virtual visit in the past 12 mos or appointment in next 3 mos     Recent Outpatient Visits              5 months ago Gastroesophageal reflux disease without esophagitis    [facility], [address], [place] Anabella Olivarez MD    Office Visit    6 months ago Primary osteoarthritis of right knee    [facility], [place] Choi, Kellen, MD    Office Visit    7 months ago Routine general medical examination at a health care facility    [facility]    Office Visit    12 months ago Vitamin D deficiency    [facility], [address], [place] Anabella Olivarez MD    Office Visit    1 year ago Primary osteoarthritis of right knee    [facility], [place] Choi, Kellen, MD    Office Visit          Future Appointments         Provider Department Appt Notes    In 1 month Anabella Olivarez MD [facility], [address], [place] medicare physical ( last px 4/12/2024)  *Care Gaps*                    Passed - EGFRCR or GFRNAA > 50     GFR Evaluation  EGFRCR: 84 , resulted on 11/8/2024          Passed - Medication is active on med list

## 2025-04-22 ENCOUNTER — PATIENT MESSAGE (OUTPATIENT)
Dept: INTERNAL MEDICINE CLINIC | Facility: CLINIC | Age: 70
End: 2025-04-22

## 2025-04-22 DIAGNOSIS — I10 ESSENTIAL HYPERTENSION: ICD-10-CM

## 2025-04-22 DIAGNOSIS — I10 PRIMARY HYPERTENSION: Primary | ICD-10-CM

## 2025-04-23 DIAGNOSIS — K21.9 GASTROESOPHAGEAL REFLUX DISEASE WITHOUT ESOPHAGITIS: ICD-10-CM

## 2025-04-23 DIAGNOSIS — R11.0 NAUSEA: ICD-10-CM

## 2025-04-23 NOTE — TELEPHONE ENCOUNTER
Dr. Piedra kindly see patient's MyChart message and advise. Pended order for review and approval if appropriate. Medicare physical scheduled on 5/13.

## 2025-04-28 RX ORDER — PANTOPRAZOLE SODIUM 40 MG/1
40 TABLET, DELAYED RELEASE ORAL
Qty: 180 TABLET | Refills: 3 | Status: SHIPPED | OUTPATIENT
Start: 2025-04-28

## 2025-04-28 NOTE — TELEPHONE ENCOUNTER
Refill Per Protocol     Requested Prescriptions   Pending Prescriptions Disp Refills    PANTOPRAZOLE 40 MG Oral Tab EC [Pharmacy Med Name: PANTOPRAZOLE SOD DR 40 MG TAB] 180 tablet 0     Sig: Take 1 tablet (40 mg total) by mouth 2 (two) times daily before meals. 1 tab in am  30 - 60  Minutes before meals breakfast and diner       Gastrointestional Medication Protocol Passed - 4/28/2025  9:07 AM        Passed - In person appointment or virtual visit in the past 12 mos or appointment in next 3 mos     Recent Outpatient Visits              6 months ago Gastroesophageal reflux disease without esophagitis    [facility], [address], [place] Anabella Olivarez MD    Office Visit    7 months ago Primary osteoarthritis of right knee    [facility], [place] Choi, Kellen, MD    Office Visit    7 months ago Routine general medical examination at a health care facility    [facility] Occupational Health    Office Visit    1 year ago Vitamin D deficiency    [facility], [address], [place] Anabella Olivarez MD    Office Visit    1 year ago Primary osteoarthritis of right knee    [facility], [place] Choi, Kellen, MD    Office Visit          Future Appointments         Provider Department Appt Notes    In 2 weeks Anabella Olivarez MD [facility], [address], [place] medicare physical ( last px 4/12/2024)  *Care Gaps*                    Passed - Medication is active on med list

## 2025-05-08 ENCOUNTER — LAB ENCOUNTER (OUTPATIENT)
Dept: LAB | Age: 70
End: 2025-05-08
Attending: INTERNAL MEDICINE
Payer: MEDICARE

## 2025-05-08 LAB
ALBUMIN SERPL-MCNC: 4.2 G/DL (ref 3.2–4.8)
ALBUMIN/GLOB SERPL: 1.8 {RATIO} (ref 1–2)
ALP LIVER SERPL-CCNC: 57 U/L (ref 55–142)
ALT SERPL-CCNC: 14 U/L (ref 10–49)
ANION GAP SERPL CALC-SCNC: 6 MMOL/L (ref 0–18)
AST SERPL-CCNC: 15 U/L (ref ?–34)
BASOPHILS # BLD AUTO: 0.07 X10(3) UL (ref 0–0.2)
BASOPHILS NFR BLD AUTO: 1.2 %
BILIRUB SERPL-MCNC: 0.4 MG/DL (ref 0.2–1.1)
BUN BLD-MCNC: 16 MG/DL (ref 9–23)
BUN/CREAT SERPL: 18.6 (ref 10–20)
CALCIUM BLD-MCNC: 9.4 MG/DL (ref 8.7–10.4)
CHLORIDE SERPL-SCNC: 105 MMOL/L (ref 98–112)
CHOLEST SERPL-MCNC: 176 MG/DL (ref ?–200)
CO2 SERPL-SCNC: 29 MMOL/L (ref 21–32)
CREAT BLD-MCNC: 0.86 MG/DL (ref 0.55–1.02)
DEPRECATED RDW RBC AUTO: 47.9 FL (ref 35.1–46.3)
EGFRCR SERPLBLD CKD-EPI 2021: 72 ML/MIN/1.73M2 (ref 60–?)
EOSINOPHIL # BLD AUTO: 0.13 X10(3) UL (ref 0–0.7)
EOSINOPHIL NFR BLD AUTO: 2.2 %
ERYTHROCYTE [DISTWIDTH] IN BLOOD BY AUTOMATED COUNT: 14.9 % (ref 11–15)
FASTING PATIENT LIPID ANSWER: YES
FASTING STATUS PATIENT QL REPORTED: YES
GLOBULIN PLAS-MCNC: 2.3 G/DL (ref 2–3.5)
GLUCOSE BLD-MCNC: 79 MG/DL (ref 70–99)
HCT VFR BLD AUTO: 37.9 % (ref 35–48)
HDLC SERPL-MCNC: 80 MG/DL (ref 40–59)
HGB BLD-MCNC: 12.7 G/DL (ref 12–16)
IMM GRANULOCYTES # BLD AUTO: 0.01 X10(3) UL (ref 0–1)
IMM GRANULOCYTES NFR BLD: 0.2 %
LDLC SERPL CALC-MCNC: 87 MG/DL (ref ?–100)
LYMPHOCYTES # BLD AUTO: 1.21 X10(3) UL (ref 1–4)
LYMPHOCYTES NFR BLD AUTO: 20.9 %
MCH RBC QN AUTO: 29.7 PG (ref 26–34)
MCHC RBC AUTO-ENTMCNC: 33.5 G/DL (ref 31–37)
MCV RBC AUTO: 88.6 FL (ref 80–100)
MONOCYTES # BLD AUTO: 0.95 X10(3) UL (ref 0.1–1)
MONOCYTES NFR BLD AUTO: 16.4 %
NEUTROPHILS # BLD AUTO: 3.41 X10 (3) UL (ref 1.5–7.7)
NEUTROPHILS # BLD AUTO: 3.41 X10(3) UL (ref 1.5–7.7)
NEUTROPHILS NFR BLD AUTO: 59.1 %
NONHDLC SERPL-MCNC: 96 MG/DL (ref ?–130)
OSMOLALITY SERPL CALC.SUM OF ELEC: 290 MOSM/KG (ref 275–295)
PLATELET # BLD AUTO: 347 10(3)UL (ref 150–450)
POTASSIUM SERPL-SCNC: 4 MMOL/L (ref 3.5–5.1)
PROT SERPL-MCNC: 6.5 G/DL (ref 5.7–8.2)
RBC # BLD AUTO: 4.28 X10(6)UL (ref 3.8–5.3)
SODIUM SERPL-SCNC: 140 MMOL/L (ref 136–145)
TRIGL SERPL-MCNC: 43 MG/DL (ref 30–149)
TSI SER-ACNC: 2.01 UIU/ML (ref 0.55–4.78)
VLDLC SERPL CALC-MCNC: 7 MG/DL (ref 0–30)
WBC # BLD AUTO: 5.8 X10(3) UL (ref 4–11)

## 2025-05-08 PROCEDURE — 80061 LIPID PANEL: CPT | Performed by: INTERNAL MEDICINE

## 2025-05-08 PROCEDURE — 36415 COLL VENOUS BLD VENIPUNCTURE: CPT | Performed by: INTERNAL MEDICINE

## 2025-05-08 PROCEDURE — 84443 ASSAY THYROID STIM HORMONE: CPT | Performed by: INTERNAL MEDICINE

## 2025-05-08 PROCEDURE — 85025 COMPLETE CBC W/AUTO DIFF WBC: CPT | Performed by: INTERNAL MEDICINE

## 2025-05-08 PROCEDURE — 80053 COMPREHEN METABOLIC PANEL: CPT | Performed by: INTERNAL MEDICINE

## 2025-06-17 ENCOUNTER — HOSPITAL ENCOUNTER (OUTPATIENT)
Dept: MAMMOGRAPHY | Age: 70
Discharge: HOME | End: 2025-06-17
Attending: INTERNAL MEDICINE
Payer: MEDICARE

## 2025-06-17 DIAGNOSIS — Z12.31 ENCOUNTER FOR SCREENING MAMMOGRAM FOR MALIGNANT NEOPLASM OF BREAST: ICD-10-CM

## 2025-06-17 PROCEDURE — 77067 SCR MAMMO BI INCL CAD: CPT | Performed by: INTERNAL MEDICINE

## 2025-06-17 PROCEDURE — 77063 BREAST TOMOSYNTHESIS BI: CPT | Performed by: INTERNAL MEDICINE

## 2025-07-08 ENCOUNTER — HOSPITAL ENCOUNTER (OUTPATIENT)
Dept: MAMMOGRAPHY | Facility: HOSPITAL | Age: 70
Discharge: HOME | End: 2025-07-08
Attending: INTERNAL MEDICINE
Payer: MEDICARE

## 2025-07-08 DIAGNOSIS — R92.8 ABNORMAL MAMMOGRAM: ICD-10-CM

## 2025-07-08 PROCEDURE — G0279 TOMOSYNTHESIS, MAMMO: HCPCS | Performed by: RADIOLOGY

## 2025-07-08 PROCEDURE — 77065 DX MAMMO INCL CAD UNI: CPT | Performed by: RADIOLOGY

## (undated) DEVICE — SHEET,DRAPE,70X100,STERILE: Brand: MEDLINE

## (undated) DEVICE — STERILE POLYISOPRENE POWDER-FREE SURGICAL GLOVES: Brand: PROTEXIS

## (undated) DEVICE — ENDOSCOPY PACK - LOWER: Brand: MEDLINE INDUSTRIES, INC.

## (undated) DEVICE — KNEE ARTHROSCOPY CDS: Brand: MEDLINE INDUSTRIES, INC.

## (undated) DEVICE — KIT CLEAN ENDOKIT 1.1OZ GOWNX2

## (undated) DEVICE — SNARE OPTMZ PLPCTM TRP

## (undated) DEVICE — SUT ETHILON 3-0 PS-2 1669H

## (undated) DEVICE — SLEEVE KENDALL SCD EXPRESS MED

## (undated) DEVICE — 10K/24K ARTHROSCOPY INFLOW TUBE SET: Brand: 10K/24K

## (undated) DEVICE — NEEDLE SPINAL 22X3-1/2 BLK

## (undated) DEVICE — Device: Brand: DEFENDO AIR/WATER/SUCTION AND BIOPSY VALVE

## (undated) DEVICE — 60 ML SYRINGE REGULAR TIP: Brand: MONOJECT

## (undated) DEVICE — GAUZE,SPONGE,FLUFF,6"X6.75",STRL,5/TRAY: Brand: MEDLINE

## (undated) DEVICE — [AGGRESSIVE PLUS CUTTER, ARTHROSCOPIC SHAVER BLADE,  DO NOT RESTERILIZE,  DO NOT USE IF PACKAGE IS DAMAGED,  KEEP DRY,  KEEP AWAY FROM SUNLIGHT]: Brand: FORMULA

## (undated) DEVICE — COVER,MAYO STAND,STERILE: Brand: MEDLINE

## (undated) DEVICE — TRAP POLYP W/ 2 SPEC TY CLR MAGNIFYING WIND

## (undated) DEVICE — DSG PETRO W3XL3IN OIL EMUL N

## (undated) DEVICE — STERILE POLYISOPRENE POWDER-FREE SURGICAL GLOVES WITH EMOLLIENT COATING: Brand: PROTEXIS

## (undated) DEVICE — PADDING CAST COTTON  4

## (undated) DEVICE — SNARE CAPTIFLEX MICRO-OVL OLY

## (undated) DEVICE — GOWN AERO CHROME XXL

## (undated) DEVICE — KIT ENDO ORCAPOD 160/180/190

## (undated) DEVICE — DISPOSABLE TOURNIQUET CUFF SINGLE BLADDER, DUAL PORT AND QUICK CONNECT CONNECTOR: Brand: COLOR CUFF

## (undated) DEVICE — SOL LACT RINGERS 3000ML

## (undated) DEVICE — FORCEP RADIAL JAW 4

## (undated) DEVICE — LASSO POLYPECTOMY SNARE: Brand: LASSO

## (undated) DEVICE — MEDI-VAC NON-CONDUCTIVE SUCTION TUBING 6MM X 1.8M (6FT.) L: Brand: CARDINAL HEALTH

## (undated) DEVICE — Device: Brand: DUAL NARE NASAL CANNULAE FEMALE LUER CON 7FT O2 TUBE

## (undated) DEVICE — APPLICATOR CHLORAPREP 26ML

## (undated) NOTE — LETTER
Notifier: Lumoid       Patient Name: Caitlyn Anguiano       Identification Number: [id number]                          Advance Beneficiary Notice of Noncoverage (ABN)   NOTE:  If Medicare doesn’t pay for D. Items/service(s) below, you may have to pay.  Medicare does not pay for everything, even some care that you or your health care provider have good reason to think you need. We expect Medicare may not pay for the D. items/service(s) below.  Items or Services Reason Medicare May Not Pay: Estimated Cost   __EKG ($129.00)  __Pap smear ($48.23) __Pelvic/Breast ($65.00)  __ Ear Irrigation ($149)  __ Injection(s)  ___ Tdap ($70)       ___ Meningitis ($206)   __Prevnar ($285)  ___ Td ($51)            ___Shingrix ($215)        __Prevnar 20 ($309)  ___ Hep A ($156)   ___Prolia ($1827.00)     __ Xiaflex ($              )   ___ Hep B ($167)      __Pneumovax ($155)                                            ___ Vaccine Administration ($31)   __ Medicare does not cover this service      __ Medicare may not pay for this   item/service for your condition     __ Medicare may not pay for this item/service as often as this        WHAT YOU NEED TO DO NOW:  Read this notice, so you can make an informed decision about your care.  Ask us any questions that you may have after you finish reading.  Choose an option below about whether to receive the D. item/service(s)  listed above.  Note: If you choose Option 1 or 2, we may help you to use any other insurance that you might have, but Medicare cannot require us to do this.  OPTIONS: Check only one box.  We cannot choose a box for you.   OPTION 1. I want the D. item/service(s) listed above. You may ask to be paid now, but I also want Medicare billed for an official decision on payment, which is sent to me on a Medicare Summary Notice (MSN). I understand that if Medicare doesn’t pay, I am responsible for payment, but I can appeal to Medicare by following the directions on the  MSN. If Medicare does pay, you will refund any payments I made to you, less co-pays or deductibles.  OPTION 2. I want the D. item/service(s) listed above, but do not bill Medicare. You may ask to be paid now as I am responsible for payment. I cannot appeal if Medicare is not billed.  OPTION 3. I don't want the D. item/service(s) listed above. I understand with this choice I am not responsible for payment, and I cannot appeal to see if Medicare would pay.    H. Additional Information:    This notice gives our opinion, not an official Medicare decision. If you have other questions on this notice or Medicare billing, call 1-800-MEDICARE (1-800.156.8096/TTY: 1-930.359.4022). Signing below means that you have received and understand this notice. You also receive a copy.  Signature: Date:       You have the right to get Medicare information in an accessible format, like large print, Braille, or audio. You also have the right to file a complaint if you feel you’ve been discriminated against. Visit Medicare.gov/about- us/luhywwpgwoxta-rvkgiaixsvvzxmvdl-htpcbw.  According to the Paperwork Reduction Act of 1995, no persons are required to respond to a collection of information unless it displays a valid OMB control number. The valid OMB control number for this information collection is 2975-1169. The time required to complete this information collection is estimated to average 7 minutes per response, including the time to review instructions, search existing data resources, gather the data needed, and complete and review the information collection. If you have comments concerning the accuracy of the time estimate or suggestions for improving this form, please write to: CMS, [facility] Security     Joellen, Attn: CANDIDA Reports Clearance Officer, [place], [place] [address].  Form CMS-R-131 (Exp. 1/31/2026) Form Approved OMB No. 5193-3144

## (undated) NOTE — LETTER
1/28/2020              Ashli Manjarrez        [address] [facility] [address] [address]         Dear Kelsy Gay,      The report of the Biopsy done on 1/23/2020 shows a Seborrheic Keratosis.   This is a benign (not cancerous) growth, and req

## (undated) NOTE — LETTER
06/08/21        [address]  [facility] [address]      Dear Annel Galindo records indicate that you have outstanding lab work and or testing that was ordered for you and has not yet been completed:  Orders Placed This Enc

## (undated) NOTE — MR AVS SNAPSHOT
[facility]  [address] [place] [address], [address] [place] [address]  Bertin Yatonio               Thank you for choosing us for your health care visit with Jame Ramirez MD.  We are glad to serve you and happy to provide you with this summary before arrival time         2. Breast milk may be given until 4 hours before arrival time         3. Clear liquids* may be given until 2 hours before arrival time  *Clear Liquids include water, dextrose (sugar) water, Pedialyte, apple juice, and white grape [address] [facility] [address]     Phone:  [phone]    - Losartan Potassium 50 MG Tabs  - Omeprazole 40 MG Cpdr            Today's Orders     US ABDOMEN COMPLETE (CPT=76700)    Complete by:   Feb 01, 2017 ( [place], [address], [address] [facility]  [address]  [address] [place] [address]    [address]  [address] [address], [place]    It is the patient's responsibility to check

## (undated) NOTE — LETTER
AUTHORIZATION FOR SURGICAL OPERATION OR OTHER PROCEDURE    1. I hereby authorize Dr. Marguerite Gonzalez and the [facility] staff assigned to my case to perform the following operation and/or procedure at the [facility] Office:  Right knee aspiraiton and injection with corticosteroid   2.  My physician has explained the nature and purpose of the operation or other procedure, possible alternative methods of treatment, the risks involved, and the possibility of complication to me.  I acknowledge that no guarantee has been made as to the result that may be obtained.  3.  I recognize that, during the course of this operation, or other procedure, unforseen conditions may necessitate additional or different procedure than those listed above.  I, therefore, further authorize and request that the above named physician, his/her physician assistants or designees perform such procedures as are, in his/her professional opinion, necessary and desirable.  4.  Any tissue or organs removed in the operation or other procedure may be disposed of by and at the discretion of the [facility] staff and [facility].  5.  I understand that in the event of a medical emergency, I will be transported by local paramedics to [facility] or other hospital emergency department.  6.  I certify that I have read and fully understand the above consent to operation and/or other procedure.    7.  I acknowledge that my physician has explained sedation/analgesia administration to me including the risks and benefits.  I consent to the administration of sedation/analgesia as may be necessary or desirable in the judgement of my physician.    Witness signature: ___________________________________________________ Date:  ______/______/_____                    Time:  ________ A.MLilia Anguiano  [id number]  6/11/1955  Patient signature:  ___________________________________________________             Relationship to  Patient:           []  Parent    Responsible person                          []  Spouse  In case of minor or                    [] Other  _____________   Incompetent name:  __________________________________________________                               (please print)      _____________      Responsible person  In case of minor or  Incompetent signature:  _______________________________________________    Statement of Physician  My signature below affirms that prior to the time of the procedure, I have explained to the patient and/or his/her guardian, the risks and benefits involved in the proposed treatment and any reasonable alternative to the proposed treatment.  I have also explained the risks and benefits involved in the refusal of the proposed treatment and have answered the patient's questions.                        Date:  ______/______/_______  Provider                      Signature:  __________________________________________________________       Time:  ___________ A.M    P.M.

## (undated) NOTE — LETTER
Date: 12/12/2022    Patient Name: Lori Soto          To Whom it may concern: This letter has been written at the patient's request. The above patient was seen at one of the [facility] locations for treatment of a medical condition. The patient may be excused from work from 12/12/22 until 12/27/22.          Sincerely,    Zaida Dunne MD

## (undated) NOTE — LETTER
03/05/21        [address]  [facility] [address]      Dear Laura Delvalle records indicate that you have outstanding lab work and or testing that was ordered for you and has not yet been completed:  Orders Placed This Enc

## (undated) NOTE — LETTER
EMERGENCY TRANSPORT INFORMATION    Patient:  Gertrudis Owens   :  1955   Address:  [address] [facility]  [place] [address]  Phone:  [phone] (home)    Insurance:  Payor: Caty Bourgeois / Plan: Rajwinder Hogan MA PPO / [employer]

## (undated) NOTE — LETTER
22  [address] [facility]  Pre-Operative Clearance Request    Patient Name:   So Rice             :   1955    Surgeon: Dr. Nirmala Greenberg             Date of Surgery: 22    Surgical Procedure: RIGHT KNEE ARTHROSCOPY, CHONDROPLASTY, POSSIBLE PARTIAL MEDIAL AND LATERAL MENISCECTOMY. Please Complete: 2-3 WEEKS PRIOR TO SURGERY TO AVOID CANCELLATION OF SURGERY. [x]  History and Physical        [x]  Medical  Clearance                       Testing is ordered by Marky CARNES per anesthesia guidelines                            **Please fax test results, H&P, and clearance to [phone] and to     P. A[facility] at [phone]**

## (undated) NOTE — LETTER
10/23/2024        Caitlyn Anguiano        [address]  , [address]        [place] [address]         To Whom It May Concern,    Caitlyn Anguiano was seen in my office today (10/23/24).  Please excuse Caitlyn from work, and she may return to work on Tuesday 10/28/24.  If you have any questions please contact my office.      Sincerely,        Anabella Olivarez MD  [address] [place] [address]  Ph: [phone]  Fax: [phone]        Document electronically generated by:  Rajesh HUYNH LPN

## (undated) NOTE — LETTER
[facility]  Administration of Anesthesia  ICaitlyn agree to be cared for by a physician anesthesiologist alone and/or with a nurse anesthetist, who is specially trained to monitor me and give me medicine to put me to sleep or keep me comfortable during my procedure    I understand that my anesthesiologist and/or anesthetist is not an employee or agent of [facility] or [employer]. He or she works for [facility]    As the patient asking for anesthesia services, I agree to:  Allow the anesthesiologist (anesthesia doctor) to give me medicine and do additional procedures as necessary. Some examples are: Starting or using an “IV” to give me medicine, fluids or blood during my procedure, and having a breathing tube placed to help me breathe when I’m asleep (intubation). In the event that my heart stops working properly, I understand that my anesthesiologist will make every effort to sustain my life, unless otherwise directed by [facility] Do Not Resuscitate documents.  Tell my anesthesia doctor before my procedure:  If I am pregnant.  The last time that I ate or drank.  iii. All of the medicines I take (including prescriptions, herbal supplements, and pills I can buy without a prescription (including street drugs/illegal medications). Failure to inform my anesthesiologist about these medicines may increase my risk of anesthetic complications.  iv.If I am allergic to anything or have had a reaction to anesthesia before.  I understand how the anesthesia medicine will help me (benefits).  I understand that with any type of anesthesia medicine there are risks:  The most common risks are: nausea, vomiting, sore throat, muscle soreness, damage to my eyes, mouth, or teeth (from breathing tube placement).  Rare risks include: remembering what happened during my procedure, allergic reactions to medications, injury to my airway, heart, lungs, vision, nerves, or  muscles and in extremely rare instances death.  My doctor has explained to me other choices available to me for my care (alternatives).  Pregnant Patients (“epidural”):  I understand that the risks of having an epidural (medicine given into my back to help control pain during labor), include itching, low blood pressure, difficulty urinating, headache or slowing of the baby’s heart. Very rare risks include infection, bleeding, seizure, irregular heart rhythms and nerve injury.  Regional Anesthesia (“spinal”, “epidural”, & “nerve blocks”):  I understand that rare but potential complications include headache, bleeding, infection, seizure, irregular heart rhythms, and nerve injury.    _____________________________________________________________________________  Patient (or Representative) Signature/Relationship to Patient  Date   Time    _____________________________________________________________________________   Name (if used)    Language/Organization   Time    _____________________________________________________________________________  Nurse Anesthetist Signature     Date   Time  _____________________________________________________________________________  Anesthesiologist Signature     Date   Time  I have discussed the procedure and information above with the patient (or patient’s representative) and answered their questions. The patient or their representative has agreed to have anesthesia services.    _____________________________________________________________________________  Witness        Date   Time  I have verified that the signature is that of the patient or patient’s representative, and that it was signed before the procedure  Patient Name: Caitlyn Anguiano     : 1955                 Printed: 2024 at 11:35 AM    Medical Record #: [id number]                                            Page 1 of 1  ----------ANESTHESIA CONSENT----------

## (undated) NOTE — LETTER
9/30/2021              Claudette Carballo        [address]    [address]        [place] [id number]               To Whom it May Concern,   Roge Mon is currently under my medical care and may  return to work at this time.     Patient can Beth Mackenzie

## (undated) NOTE — LETTER
1/12/2023    Yu Paniagua        [address]            Dear Yu Paniagua,      Our records indicate that you are due for an appointment for a Colonoscopy with Lyndon He MD. Our doctors are booking out about 3-5 months in advance for procedures. Please call our office to schedule a phone screening appointment to plan for the procedure(s). Your medical well-being is important to us. If your insurance requires a referral, please call your primary care office to request one.       Thank you,      The Physicians and Staff at [facility]

## (undated) NOTE — LETTER
22  [address] [facility]  Pre-Operative Clearance Request    Patient Name:   Claude Zapata             :   1955    Surgeon: Dr. Diego Benoit             Date of Surgery: 22    Surgical Procedure: LEFT KNEE ARTHROSCOPY, CHONDROPLASTY AND POSSIBLE PARTIAL MEDIAL AND LATERAL MENISCECTOMY. Please Complete: 2-3 WEEKS PRIOR TO SURGERY TO AVOID CANCELLATION OF SURGERY. [x]  History and Physical        [x]  Medical  Clearance                       Testing is ordered by Everardo CARNES per anesthesia guidelines                            **Please fax test results, H&P, and clearance to [phone] and to    [facility] at [phone]**

## (undated) NOTE — LETTER
06/08/19        Latha Stapleton  [address] [id number]      Dear Maikel Astudillo,    [address] records indicate that you have outstanding lab work and or testing that was ordered for you and has not yet been completed:  Orders Placed T